# Patient Record
Sex: FEMALE | Race: WHITE | NOT HISPANIC OR LATINO
[De-identification: names, ages, dates, MRNs, and addresses within clinical notes are randomized per-mention and may not be internally consistent; named-entity substitution may affect disease eponyms.]

---

## 2017-01-09 ENCOUNTER — NON-APPOINTMENT (OUTPATIENT)
Age: 77
End: 2017-01-09

## 2017-01-09 ENCOUNTER — APPOINTMENT (OUTPATIENT)
Dept: CARDIOLOGY | Facility: CLINIC | Age: 77
End: 2017-01-09

## 2017-01-09 VITALS
WEIGHT: 157 LBS | HEART RATE: 80 BPM | HEIGHT: 64 IN | BODY MASS INDEX: 26.8 KG/M2 | DIASTOLIC BLOOD PRESSURE: 76 MMHG | SYSTOLIC BLOOD PRESSURE: 126 MMHG

## 2017-01-13 ENCOUNTER — EMERGENCY (EMERGENCY)
Facility: HOSPITAL | Age: 77
LOS: 1 days | End: 2017-01-13
Payer: COMMERCIAL

## 2017-01-13 PROCEDURE — 72125 CT NECK SPINE W/O DYE: CPT | Mod: 26

## 2017-01-13 PROCEDURE — 70450 CT HEAD/BRAIN W/O DYE: CPT | Mod: 26

## 2017-01-13 PROCEDURE — 99284 EMERGENCY DEPT VISIT MOD MDM: CPT

## 2017-01-24 ENCOUNTER — OUTPATIENT (OUTPATIENT)
Dept: OUTPATIENT SERVICES | Facility: HOSPITAL | Age: 77
LOS: 1 days | End: 2017-01-24

## 2017-02-06 ENCOUNTER — INPATIENT (INPATIENT)
Facility: HOSPITAL | Age: 77
LOS: 1 days | Discharge: NURSING, REHAB OR LONG-TERM CARE | End: 2017-02-08
Payer: MEDICARE

## 2017-02-06 ENCOUNTER — OUTPATIENT (OUTPATIENT)
Dept: OUTPATIENT SERVICES | Facility: HOSPITAL | Age: 77
LOS: 1 days | End: 2017-02-06

## 2017-02-06 PROCEDURE — 73560 X-RAY EXAM OF KNEE 1 OR 2: CPT | Mod: 26,RT

## 2017-02-07 ENCOUNTER — OUTPATIENT (OUTPATIENT)
Dept: OUTPATIENT SERVICES | Facility: HOSPITAL | Age: 77
LOS: 1 days | End: 2017-02-07

## 2017-02-08 ENCOUNTER — INPATIENT (INPATIENT)
Facility: HOSPITAL | Age: 77
LOS: 14 days | Discharge: HOME | End: 2017-02-23

## 2017-02-22 ENCOUNTER — OUTPATIENT (OUTPATIENT)
Dept: OUTPATIENT SERVICES | Facility: HOSPITAL | Age: 77
LOS: 1 days | End: 2017-02-22

## 2017-02-23 ENCOUNTER — OUTPATIENT (OUTPATIENT)
Dept: OUTPATIENT SERVICES | Facility: HOSPITAL | Age: 77
LOS: 1 days | End: 2017-02-23

## 2017-07-11 ENCOUNTER — NON-APPOINTMENT (OUTPATIENT)
Age: 77
End: 2017-07-11

## 2017-07-11 ENCOUNTER — APPOINTMENT (OUTPATIENT)
Dept: CARDIOLOGY | Facility: CLINIC | Age: 77
End: 2017-07-11
Payer: MEDICARE

## 2017-07-11 VITALS
SYSTOLIC BLOOD PRESSURE: 150 MMHG | OXYGEN SATURATION: 97 % | DIASTOLIC BLOOD PRESSURE: 82 MMHG | HEART RATE: 55 BPM | TEMPERATURE: 98 F | WEIGHT: 153 LBS | HEIGHT: 64 IN | BODY MASS INDEX: 26.12 KG/M2

## 2017-07-11 PROCEDURE — 99214 OFFICE O/P EST MOD 30 MIN: CPT

## 2017-07-11 PROCEDURE — 93000 ELECTROCARDIOGRAM COMPLETE: CPT

## 2017-07-24 ENCOUNTER — APPOINTMENT (OUTPATIENT)
Dept: CARDIOLOGY | Facility: CLINIC | Age: 77
End: 2017-07-24

## 2017-07-24 VITALS
HEART RATE: 58 BPM | WEIGHT: 148 LBS | SYSTOLIC BLOOD PRESSURE: 148 MMHG | DIASTOLIC BLOOD PRESSURE: 80 MMHG | BODY MASS INDEX: 25.27 KG/M2 | HEIGHT: 64 IN

## 2017-09-05 ENCOUNTER — OUTPATIENT (OUTPATIENT)
Dept: OUTPATIENT SERVICES | Facility: HOSPITAL | Age: 77
LOS: 1 days | End: 2017-09-05

## 2017-10-19 ENCOUNTER — APPOINTMENT (OUTPATIENT)
Dept: CARDIOLOGY | Facility: CLINIC | Age: 77
End: 2017-10-19
Payer: MEDICARE

## 2017-10-19 ENCOUNTER — NON-APPOINTMENT (OUTPATIENT)
Age: 77
End: 2017-10-19

## 2017-10-19 VITALS
DIASTOLIC BLOOD PRESSURE: 80 MMHG | BODY MASS INDEX: 24.59 KG/M2 | WEIGHT: 144 LBS | HEIGHT: 64 IN | HEART RATE: 68 BPM | SYSTOLIC BLOOD PRESSURE: 170 MMHG

## 2017-10-19 PROCEDURE — 99215 OFFICE O/P EST HI 40 MIN: CPT

## 2017-10-19 PROCEDURE — 93000 ELECTROCARDIOGRAM COMPLETE: CPT

## 2017-10-19 RX ORDER — FLUTICASONE PROPIONATE 50 UG/1
50 SPRAY, METERED NASAL
Qty: 16 | Refills: 0 | Status: COMPLETED | COMMUNITY
Start: 2017-09-12

## 2017-10-19 RX ORDER — AMOXICILLIN 875 MG/1
875 TABLET, FILM COATED ORAL
Qty: 20 | Refills: 0 | Status: COMPLETED | COMMUNITY
Start: 2017-09-01

## 2017-10-19 RX ORDER — AMOXICILLIN 500 MG/1
500 CAPSULE ORAL
Qty: 28 | Refills: 0 | Status: COMPLETED | COMMUNITY
Start: 2017-08-29

## 2017-10-19 RX ORDER — MOXIFLOXACIN OPHTHALMIC 5 MG/ML
0.5 SOLUTION/ DROPS OPHTHALMIC
Qty: 3 | Refills: 0 | Status: COMPLETED | COMMUNITY
Start: 2017-09-12

## 2017-10-19 RX ORDER — PREDNISOLONE ACETATE 10 MG/ML
1 SUSPENSION/ DROPS OPHTHALMIC
Qty: 5 | Refills: 0 | Status: COMPLETED | COMMUNITY
Start: 2017-09-12

## 2017-10-19 RX ORDER — GABAPENTIN 100 MG/1
100 CAPSULE ORAL
Qty: 90 | Refills: 0 | Status: COMPLETED | COMMUNITY
Start: 2017-06-01

## 2017-11-07 ENCOUNTER — INPATIENT (INPATIENT)
Facility: HOSPITAL | Age: 77
LOS: 2 days | Discharge: HOME | DRG: 247 | End: 2017-11-10
Attending: HOSPITALIST | Admitting: STUDENT IN AN ORGANIZED HEALTH CARE EDUCATION/TRAINING PROGRAM
Payer: MEDICARE

## 2017-11-07 VITALS
DIASTOLIC BLOOD PRESSURE: 78 MMHG | RESPIRATION RATE: 20 BRPM | OXYGEN SATURATION: 98 % | HEART RATE: 64 BPM | SYSTOLIC BLOOD PRESSURE: 165 MMHG | TEMPERATURE: 98 F

## 2017-11-07 DIAGNOSIS — R32 UNSPECIFIED URINARY INCONTINENCE: ICD-10-CM

## 2017-11-07 DIAGNOSIS — I10 ESSENTIAL (PRIMARY) HYPERTENSION: ICD-10-CM

## 2017-11-07 DIAGNOSIS — I20.0 UNSTABLE ANGINA: ICD-10-CM

## 2017-11-07 DIAGNOSIS — E11.9 TYPE 2 DIABETES MELLITUS WITHOUT COMPLICATIONS: ICD-10-CM

## 2017-11-07 DIAGNOSIS — Z29.9 ENCOUNTER FOR PROPHYLACTIC MEASURES, UNSPECIFIED: ICD-10-CM

## 2017-11-07 LAB
ALBUMIN SERPL ELPH-MCNC: 4.5 G/DL — SIGNIFICANT CHANGE UP (ref 3.3–5)
ALP SERPL-CCNC: 100 U/L — SIGNIFICANT CHANGE UP (ref 40–120)
ALT FLD-CCNC: 21 U/L RC — SIGNIFICANT CHANGE UP (ref 10–45)
ANION GAP SERPL CALC-SCNC: 15 MMOL/L — SIGNIFICANT CHANGE UP (ref 5–17)
AST SERPL-CCNC: 23 U/L — SIGNIFICANT CHANGE UP (ref 10–40)
BASOPHILS # BLD AUTO: 0.1 K/UL — SIGNIFICANT CHANGE UP (ref 0–0.2)
BASOPHILS NFR BLD AUTO: 1 % — SIGNIFICANT CHANGE UP (ref 0–2)
BILIRUB SERPL-MCNC: 0.5 MG/DL — SIGNIFICANT CHANGE UP (ref 0.2–1.2)
BUN SERPL-MCNC: 22 MG/DL — SIGNIFICANT CHANGE UP (ref 7–23)
CALCIUM SERPL-MCNC: 9.8 MG/DL — SIGNIFICANT CHANGE UP (ref 8.4–10.5)
CHLORIDE SERPL-SCNC: 100 MMOL/L — SIGNIFICANT CHANGE UP (ref 96–108)
CK MB BLD-MCNC: 5.8 % — HIGH (ref 0–3.5)
CK MB CFR SERPL CALC: 2 NG/ML — SIGNIFICANT CHANGE UP (ref 0–3.8)
CK MB CFR SERPL CALC: 2.1 NG/ML — SIGNIFICANT CHANGE UP (ref 0–3.8)
CK SERPL-CCNC: 28 U/L — SIGNIFICANT CHANGE UP (ref 25–170)
CK SERPL-CCNC: 36 U/L — SIGNIFICANT CHANGE UP (ref 25–170)
CO2 SERPL-SCNC: 24 MMOL/L — SIGNIFICANT CHANGE UP (ref 22–31)
CREAT SERPL-MCNC: 0.8 MG/DL — SIGNIFICANT CHANGE UP (ref 0.5–1.3)
EOSINOPHIL # BLD AUTO: 0.1 K/UL — SIGNIFICANT CHANGE UP (ref 0–0.5)
EOSINOPHIL NFR BLD AUTO: 0.8 % — SIGNIFICANT CHANGE UP (ref 0–6)
GAS PNL BLDV: SIGNIFICANT CHANGE UP
GLUCOSE BLDC GLUCOMTR-MCNC: 142 MG/DL — HIGH (ref 70–99)
GLUCOSE SERPL-MCNC: 116 MG/DL — HIGH (ref 70–99)
HCT VFR BLD CALC: 48.3 % — HIGH (ref 34.5–45)
HGB BLD-MCNC: 16.1 G/DL — HIGH (ref 11.5–15.5)
LYMPHOCYTES # BLD AUTO: 2.4 K/UL — SIGNIFICANT CHANGE UP (ref 1–3.3)
LYMPHOCYTES # BLD AUTO: 25.7 % — SIGNIFICANT CHANGE UP (ref 13–44)
MCHC RBC-ENTMCNC: 30.5 PG — SIGNIFICANT CHANGE UP (ref 27–34)
MCHC RBC-ENTMCNC: 33.4 GM/DL — SIGNIFICANT CHANGE UP (ref 32–36)
MCV RBC AUTO: 91.3 FL — SIGNIFICANT CHANGE UP (ref 80–100)
MONOCYTES # BLD AUTO: 1.1 K/UL — HIGH (ref 0–0.9)
MONOCYTES NFR BLD AUTO: 11.9 % — SIGNIFICANT CHANGE UP (ref 2–14)
NEUTROPHILS # BLD AUTO: 5.6 K/UL — SIGNIFICANT CHANGE UP (ref 1.8–7.4)
NEUTROPHILS NFR BLD AUTO: 60.6 % — SIGNIFICANT CHANGE UP (ref 43–77)
PLATELET # BLD AUTO: 221 K/UL — SIGNIFICANT CHANGE UP (ref 150–400)
POTASSIUM SERPL-MCNC: 3.9 MMOL/L — SIGNIFICANT CHANGE UP (ref 3.5–5.3)
POTASSIUM SERPL-SCNC: 3.9 MMOL/L — SIGNIFICANT CHANGE UP (ref 3.5–5.3)
PROT SERPL-MCNC: 7.7 G/DL — SIGNIFICANT CHANGE UP (ref 6–8.3)
RBC # BLD: 5.3 M/UL — HIGH (ref 3.8–5.2)
RBC # FLD: 13 % — SIGNIFICANT CHANGE UP (ref 10.3–14.5)
SODIUM SERPL-SCNC: 139 MMOL/L — SIGNIFICANT CHANGE UP (ref 135–145)
TROPONIN T SERPL-MCNC: <0.01 NG/ML — SIGNIFICANT CHANGE UP (ref 0–0.06)
TROPONIN T SERPL-MCNC: <0.01 NG/ML — SIGNIFICANT CHANGE UP (ref 0–0.06)
WBC # BLD: 9.2 K/UL — SIGNIFICANT CHANGE UP (ref 3.8–10.5)
WBC # FLD AUTO: 9.2 K/UL — SIGNIFICANT CHANGE UP (ref 3.8–10.5)

## 2017-11-07 PROCEDURE — 71020: CPT | Mod: 26

## 2017-11-07 PROCEDURE — 99223 1ST HOSP IP/OBS HIGH 75: CPT

## 2017-11-07 PROCEDURE — 99497 ADVNCD CARE PLAN 30 MIN: CPT | Mod: 25

## 2017-11-07 PROCEDURE — 99285 EMERGENCY DEPT VISIT HI MDM: CPT | Mod: GC

## 2017-11-07 PROCEDURE — 99223 1ST HOSP IP/OBS HIGH 75: CPT | Mod: GC

## 2017-11-07 RX ORDER — CLOPIDOGREL BISULFATE 75 MG/1
300 TABLET, FILM COATED ORAL ONCE
Qty: 0 | Refills: 0 | Status: COMPLETED | OUTPATIENT
Start: 2017-11-07 | End: 2017-11-07

## 2017-11-07 RX ORDER — HEPARIN SODIUM 5000 [USP'U]/ML
3800 INJECTION INTRAVENOUS; SUBCUTANEOUS EVERY 6 HOURS
Qty: 0 | Refills: 0 | Status: DISCONTINUED | OUTPATIENT
Start: 2017-11-07 | End: 2017-11-08

## 2017-11-07 RX ORDER — AMLODIPINE BESYLATE 2.5 MG/1
10 TABLET ORAL DAILY
Qty: 0 | Refills: 0 | Status: DISCONTINUED | OUTPATIENT
Start: 2017-11-07 | End: 2017-11-10

## 2017-11-07 RX ORDER — ASPIRIN/CALCIUM CARB/MAGNESIUM 324 MG
162 TABLET ORAL ONCE
Qty: 0 | Refills: 0 | Status: COMPLETED | OUTPATIENT
Start: 2017-11-07 | End: 2017-11-07

## 2017-11-07 RX ORDER — INSULIN LISPRO 100/ML
VIAL (ML) SUBCUTANEOUS AT BEDTIME
Qty: 0 | Refills: 0 | Status: DISCONTINUED | OUTPATIENT
Start: 2017-11-07 | End: 2017-11-10

## 2017-11-07 RX ORDER — GLUCAGON INJECTION, SOLUTION 0.5 MG/.1ML
1 INJECTION, SOLUTION SUBCUTANEOUS ONCE
Qty: 0 | Refills: 0 | Status: DISCONTINUED | OUTPATIENT
Start: 2017-11-07 | End: 2017-11-10

## 2017-11-07 RX ORDER — DEXTROSE 50 % IN WATER 50 %
25 SYRINGE (ML) INTRAVENOUS ONCE
Qty: 0 | Refills: 0 | Status: DISCONTINUED | OUTPATIENT
Start: 2017-11-07 | End: 2017-11-10

## 2017-11-07 RX ORDER — INSULIN LISPRO 100/ML
VIAL (ML) SUBCUTANEOUS
Qty: 0 | Refills: 0 | Status: DISCONTINUED | OUTPATIENT
Start: 2017-11-07 | End: 2017-11-10

## 2017-11-07 RX ORDER — INFLUENZA VIRUS VACCINE 15; 15; 15; 15 UG/.5ML; UG/.5ML; UG/.5ML; UG/.5ML
0.5 SUSPENSION INTRAMUSCULAR ONCE
Qty: 0 | Refills: 0 | Status: COMPLETED | OUTPATIENT
Start: 2017-11-07 | End: 2017-11-09

## 2017-11-07 RX ORDER — DEXTROSE 50 % IN WATER 50 %
1 SYRINGE (ML) INTRAVENOUS ONCE
Qty: 0 | Refills: 0 | Status: DISCONTINUED | OUTPATIENT
Start: 2017-11-07 | End: 2017-11-10

## 2017-11-07 RX ORDER — SODIUM CHLORIDE 9 MG/ML
1000 INJECTION, SOLUTION INTRAVENOUS
Qty: 0 | Refills: 0 | Status: DISCONTINUED | OUTPATIENT
Start: 2017-11-07 | End: 2017-11-10

## 2017-11-07 RX ORDER — HEPARIN SODIUM 5000 [USP'U]/ML
INJECTION INTRAVENOUS; SUBCUTANEOUS
Qty: 25000 | Refills: 0 | Status: DISCONTINUED | OUTPATIENT
Start: 2017-11-07 | End: 2017-11-08

## 2017-11-07 RX ORDER — DEXTROSE 50 % IN WATER 50 %
12.5 SYRINGE (ML) INTRAVENOUS ONCE
Qty: 0 | Refills: 0 | Status: DISCONTINUED | OUTPATIENT
Start: 2017-11-07 | End: 2017-11-10

## 2017-11-07 RX ORDER — CLOPIDOGREL BISULFATE 75 MG/1
75 TABLET, FILM COATED ORAL DAILY
Qty: 0 | Refills: 0 | Status: DISCONTINUED | OUTPATIENT
Start: 2017-11-08 | End: 2017-11-10

## 2017-11-07 RX ADMIN — Medication 162 MILLIGRAM(S): at 15:11

## 2017-11-07 RX ADMIN — HEPARIN SODIUM 750 UNIT(S)/HR: 5000 INJECTION INTRAVENOUS; SUBCUTANEOUS at 18:56

## 2017-11-07 NOTE — H&P ADULT - NSHPPHYSICALEXAM_GEN_ALL_CORE
PHYSICAL EXAM:  GENERAL: NAD, well-groomed, well-developed  HEAD:  Atraumatic, Normocephalic  EYES: EOMI, PERRLA, conjunctiva and sclera clear  ENMT: No tonsillar erythema, exudates, or enlargement; Moist mucous membranes, Good dentition, No lesions  NECK: Supple, No JVD, Normal thyroid  CHEST/LUNG: Clear to percussion bilaterally; No rales, rhonchi, wheezing, or rubs  HEART: Regular rate and rhythm; No murmurs, rubs, or gallops, no LE edema.   ABDOMEN: Soft, Nontender, Nondistended; Bowel sounds present  EXTREMITIES:  2+ Peripheral Pulses, No clubbing, cyanosis, or edema  LYMPH: No lymphadenopathy noted  SKIN: No rashes or lesions  NERVOUS SYSTEM:  Alert & Oriented X3, Good concentration; Motor Strength 5/5 B/L upper and lower extremities.

## 2017-11-07 NOTE — H&P ADULT - PROBLEM SELECTOR PLAN 5
c/w hep gtt  -pt on omeprazole at home; pt again wants to take own med instead of formulary ppi given prior issues with swallowing alternate pills; omeprazole to be verified.

## 2017-11-07 NOTE — ED PROVIDER NOTE - PROGRESS NOTE DETAILS
JOHANNA PGY-2: Pt admitted to hosptialist for unstable angina. pt took plavix 75mg this AM will dose 225mg now heparin gtt started

## 2017-11-07 NOTE — H&P ADULT - NSHPSOCIALHISTORY_GEN_ALL_CORE
Social History:    Marital Status:  (   )    ( x  ) Single    (   )    (  )   Occupation: retired [employer]  Lives with: (x  ) alone  (  ) children   (  ) spouse   (  ) parents  (  ) other    Substance Use (street drugs): ( x ) never used  (  ) other:  Tobacco Usage:  ( x  ) never smoked   (   ) former smoker   (   ) current smoker  (     ) pack year  (        ) last cigarette date  Alcohol Usage: denies  Sexual History:

## 2017-11-07 NOTE — PATIENT PROFILE ADULT. - NS PRO AD PATIENT TYPE
Living Will/Health Care Proxy (HCP) Health Care Proxy (HCP)/Living Will/Nonhospital DNR- MD Scar Howard aware/Do Not Resuscitate (DNR)

## 2017-11-07 NOTE — H&P ADULT - PSH
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

## 2017-11-07 NOTE — H&P ADULT - PROBLEM SELECTOR PLAN 1
-hpi c/w unstable angina; worsening sx with exertion ok at rest  -cards recs appreciated; given high risk features/hx/pmh, plan for LHC in am  -s/p asa/plavix load; c/w asa/plavix daily  -c/w hep gtt  -pt not on BB at home, consider adding post cath  -c/w arb  -trend cardiac enzymes, monitor on tele -hpi c/w unstable angina; worsening sx with exertion ok at rest  -cards recs appreciated; given high risk features/hx/pmh, plan for LHC in am  -s/p asa/plavix load; c/w asa/plavix daily  -c/w hep gtt  -pt not on BB at home, consider adding post cath  -c/w arb  -trend cardiac enzymes, monitor on tele  -pt to continue on statin (crestor to be verified as alternate not tolerated 2/2 dysphagia)

## 2017-11-07 NOTE — H&P ADULT - PROBLEM SELECTOR PLAN 6
-20 minutes spent clarifying advanced directives with patient and/or family at bedside.  Patient initially presented with multiple advanced care forms present; she had a DNR form signed.  After discussing the DNR to confirm its validity, she states she did not truly understand what it meant and that she did not want a breathing tube, but did want CPR.  She expressed her main concern was "not to end up a vegetable." We discussed all details of CPR, intubation, and the code status of DNR vs DNI.  At this time, patient demonstrates capacity with insight into condition, and chooses to be FULL CODE.  -Her dnr forms will be disposed of, order in sunrise d/c.   -All questions answered at bedside.   -Pt has proxy forms with her at bedside, RN to photocopy and place in chart; daughter/son are proxies; at this time, patients wishes after re-education are to have trial of CPR and intubation should she undergo cardiac arrest, and for her family to withdraw care afterwards if it appears her clinical status were not to improve.

## 2017-11-07 NOTE — CONSULT NOTE ADULT - SUBJECTIVE AND OBJECTIVE BOX
Date of Admission:    Patient is a 77y old  Female who presents with a chief complaint of chest pain     HISTORY OF PRESENT ILLNESS:     78 yo woman w/PMHx HTN, HLD, GERD, Chronic R. brachial a. occlusion, sinus bradycardia, pulmonary fibrosis and CAD s/p PCI mRCA and hx of instent restenosis of the R. postero lateral branch managed with ALEJANDRA now p/w chest pain and VILLA for 1 week.         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 No nausea, vomiting, or hematemesis; No diarrhea or constipation. No melena or hematochezia.  BACK: No back pain  GENITOURINARY: No dysuria, frequency or hematuria  NEUROLOGICAL: No numbness or weakness  SKIN: No itching, burning, rashes, or lesions   All other review of systems is negative unless indicated above.      PHYSICAL EXAM:  T(C): 36.8 (11-07-17 @ 13:59), Max: 36.8 (11-07-17 @ 13:59)  HR: 64 (11-07-17 @ 13:59) (64 - 64)  BP: 165/78 (11-07-17 @ 13:59) (165/78 - 165/78)  RR: 20 (11-07-17 @ 13:59) (20 - 20)  SpO2: 98% (11-07-17 @ 13:59) (98% - 98%)  Wt(kg): --  I&O's Summary    Appearance: Normal	  HEENT:   Normal oral mucosa, PERRL, EOMI	  Lymphatic: No lymphadenopathy  Cardiovascular: Normal S1 S2, No JVD, systolic crescendo decrescendo murmur heard at LUSB , No edema  Respiratory: Lungs clear to auscultation	  Psychiatry: A & O x 3, Mood & affect appropriate  Gastrointestinal:  Soft, Non-tender, + BS	  Skin: No rashes, No ecchymoses, No cyanosis	  Neurologic: Non-focal  Extremities: Normal range of motion, No clubbing, cyanosis or edema  Vascular: Peripheral pulses palpable 2+ bilaterally        LABS:	 	    CBC Full  -  ( 07 Nov 2017 15:12 )  WBC Count : 9.2 K/uL  Hemoglobin : 16.1 g/dL  Hematocrit : 48.3 %  Platelet Count - Automated : 221 K/uL  Mean Cell Volume : 91.3 fl  Mean Cell Hemoglobin : 30.5 pg  Mean Cell Hemoglobin Concentration : 33.4 gm/dL  Auto Neutrophil # : 5.6 K/uL  Auto Lymphocyte # : 2.4 K/uL  Auto Monocyte # : 1.1 K/uL  Auto Eosinophil # : 0.1 K/uL  Auto Basophil # : 0.1 K/uL  Auto Neutrophil % : 60.6 %  Auto Lymphocyte % : 25.7 %  Auto Monocyte % : 11.9 %  Auto Eosinophil % : 0.8 %  Auto Basophil % : 1.0 %    11-07    139  |  100  |  22  ----------------------------<  116<H>  3.9   |  24  |  0.80    Ca    9.8      07 Nov 2017 15:12    TPro  7.7  /  Alb  4.5  /  TBili  0.5  /  DBili  x   /  AST  23  /  ALT  21  /  AlkPhos  100  11-07      proBNP: Serum Pro-Brain Natriuretic Peptide: 42 pg/mL (11-07 @ 15:12)    Initial trop negative.      ECG:  	      PREVIOUS DIAGNOSTIC TESTING:      EKG: 10/19/2017, RSR-sinus bradycardia 56, IL.20 borderline 1st degree heart block, QRS.09, -30 degrees, QTc.42, poor precordial R wave progression. There is no change from multiple prior.     Cardiac Cath: 11/13/2014, 100% Cx-OM1 well collateralized from right, moderate mid-LAD, patent right postero-lateral with no in-stent re-stenosis. Normal ventricular function.     Stent: 7/29/2013, new ALEJANDRA to 95% re-stenosis in right postero-lateral, prior stent 2007, patent Taxus to mid-RCA. Chronic total mid-LAD occlusion.     ASSESSMENT/PLAN: 	    77F w/HTN, HLD, GERD, Chronic R. brachial a. occlusion, sinus bradycardia, pulmonary fibrosis and CAD s/p PCI mRCA and hx of instent restenosis of the R. postero lateral branch managed with ALEJANDRA now p/w chest pain and VILLA for 1 week. Date of Admission: 11/7/17     Patient is a 77y old  Female who presents with a chief complaint of chest pain     HISTORY OF PRESENT ILLNESS:     76 yo woman w/PMHx DM HTN, HLD, GERD, Chronic R. brachial a. occlusion, sinus bradycardia, pulmonary fibrosis and CAD s/p PCI mRCA and hx of instent restenosis of the R. postero lateral branch managed with ALEJANDRA now p/w chest pain and VILLA for 9 days. She reports nonradiating substernal pressure with occasional associated nausea improved with rest. She presented to the ED today because the pressure was significantly bothersome when ambulating up a flight of stairs and she reports "being unable to catch [her] breath." The chest pressure and VILLA are relieved with rest.  No recent med changes. She is adherent with her medications. ROS otherwise negative for palpitations, orthopnea, LE edema, abdominal pain, change in bowel habits, or sick contacts/viral symptoms.       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 No nausea, vomiting, or hematemesis; No diarrhea or constipation. No melena or hematochezia.  BACK: No back pain  GENITOURINARY: No dysuria, frequency or hematuria  NEUROLOGICAL: No numbness or weakness  SKIN: No itching, burning, rashes, or lesions   All other review of systems is negative unless indicated above.      PHYSICAL EXAM:  T(C): 36.8 (11-07-17 @ 13:59), Max: 36.8 (11-07-17 @ 13:59)  HR: 64 (11-07-17 @ 13:59) (64 - 64)  BP: 165/78 (11-07-17 @ 13:59) (165/78 - 165/78)  RR: 20 (11-07-17 @ 13:59) (20 - 20)  SpO2: 98% (11-07-17 @ 13:59) (98% - 98%)  Wt(kg): --  I&O's Summary    Appearance: Very pleasant, sitting up comfortably in ED stretcher in no distress 	  HEENT:   Normal oral mucosa, PERRL, EOMI	  Lymphatic: No lymphadenopathy  Cardiovascular: Normal S1 S2, No JVD, soft systolic murmur heard at LUSB , No edema  Respiratory: Lungs clear to auscultation	  Psychiatry: A & O x 3, Mood & affect appropriate  Gastrointestinal:  Soft, Non-tender, + BS	  Skin: No rashes, No ecchymoses, No cyanosis	  Neurologic: Non-focal  Extremities: Normal range of motion, No clubbing, cyanosis or edema  Vascular: Peripheral pulses palpable 2+ bilaterally, Absent R. radial pulse (chronic)         LABS:	 	    CBC Full  -  ( 07 Nov 2017 15:12 )  WBC Count : 9.2 K/uL  Hemoglobin : 16.1 g/dL  Hematocrit : 48.3 %  Platelet Count - Automated : 221 K/uL  Mean Cell Volume : 91.3 fl  Mean Cell Hemoglobin : 30.5 pg  Mean Cell Hemoglobin Concentration : 33.4 gm/dL  Auto Neutrophil # : 5.6 K/uL  Auto Lymphocyte # : 2.4 K/uL  Auto Monocyte # : 1.1 K/uL  Auto Eosinophil # : 0.1 K/uL  Auto Basophil # : 0.1 K/uL  Auto Neutrophil % : 60.6 %  Auto Lymphocyte % : 25.7 %  Auto Monocyte % : 11.9 %  Auto Eosinophil % : 0.8 %  Auto Basophil % : 1.0 %    11-07    139  |  100  |  22  ----------------------------<  116<H>  3.9   |  24  |  0.80    Ca    9.8      07 Nov 2017 15:12    TPro  7.7  /  Alb  4.5  /  TBili  0.5  /  DBili  x   /  AST  23  /  ALT  21  /  AlkPhos  100  11-07      proBNP: Serum Pro-Brain Natriuretic Peptide: 42 pg/mL (11-07 @ 15:12)    Initial trop negative.      ECG:  	NSR q waves inferior/anteroseptal leads; no acute ST changes.       PREVIOUS DIAGNOSTIC TESTING:      EKG: 10/19/2017, RSR-sinus bradycardia 56, MN.20 borderline 1st degree heart block, QRS.09, -30 degrees, QTc.42, poor precordial R wave progression. There is no change from multiple prior.     Cardiac Cath: 11/13/2014, 100% Cx-OM1 well collateralized from right, moderate mid-LAD, patent right postero-lateral with no in-stent re-stenosis. Normal ventricular function.     Stent: 7/29/2013, new ALEJANDRA to 95% re-stenosis in right postero-lateral, prior stent 2007, patent Taxus to mid-RCA. Chronic total mid-LAD occlusion.     ASSESSMENT/PLAN: 	    77F w/HTN, HLD, GERD, Chronic R. brachial a. occlusion, sinus bradycardia, pulmonary fibrosis and CAD s/p PCI mRCA and hx of instent restenosis of the R. postero lateral branch managed with ALEJANDRA now p/w chest pain and VILLA for 9 days concerning for unstable angina.     #Chest pressure and VILLA in a patient with high risk features including hx of prior CAD, DM. Symptoms concerning for unstable angina. No acute EKG changes; Initial trop negative  - s/p ASA load Recommend heparin gtt, Plavix 300mg qd  - Trend serial enzymes and EKG    Will d/w Dr. Dye and f/u primary ED team. Date of Admission: 11/7/17     Patient is a 77y old  Female who presents with a chief complaint of chest pain     HISTORY OF PRESENT ILLNESS:     76 yo woman w/PMHx DM HTN, HLD, GERD, Chronic R. brachial a. occlusion, sinus bradycardia, pulmonary fibrosis and CAD s/p PCI mRCA and hx of instent restenosis of the R. postero lateral branch managed with ALEJANDRA now p/w chest pain and VILLA for 9 days. She reports nonradiating substernal pressure with occasional associated nausea improved with rest. She presented to the ED today because the pressure was significantly bothersome when ambulating up a flight of stairs and she reports "being unable to catch [her] breath." The chest pressure and VILLA are relieved with rest.  No recent med changes. She is adherent with her medications. ROS otherwise negative for palpitations, orthopnea, LE edema, abdominal pain, change in bowel habits, or sick contacts/viral symptoms.       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 No nausea, vomiting, or hematemesis; No diarrhea or constipation. No melena or hematochezia.  BACK: No back pain  GENITOURINARY: No dysuria, frequency or hematuria  NEUROLOGICAL: No numbness or weakness  SKIN: No itching, burning, rashes, or lesions   All other review of systems is negative unless indicated above.      PHYSICAL EXAM:  T(C): 36.8 (11-07-17 @ 13:59), Max: 36.8 (11-07-17 @ 13:59)  HR: 64 (11-07-17 @ 13:59) (64 - 64)  BP: 165/78 (11-07-17 @ 13:59) (165/78 - 165/78)  RR: 20 (11-07-17 @ 13:59) (20 - 20)  SpO2: 98% (11-07-17 @ 13:59) (98% - 98%)  Wt(kg): --  I&O's Summary    Appearance: Very pleasant, sitting up comfortably in ED stretcher in no distress 	  HEENT:   Normal oral mucosa, PERRL, EOMI	  Lymphatic: No lymphadenopathy  Cardiovascular: Normal S1 S2, No JVD, soft systolic murmur heard at LUSB , No edema  Respiratory: Lungs clear to auscultation	  Psychiatry: A & O x 3, Mood & affect appropriate  Gastrointestinal:  Soft, Non-tender, + BS	  Skin: No rashes, No ecchymoses, No cyanosis	  Neurologic: Non-focal  Extremities: Normal range of motion, No clubbing, cyanosis or edema  Vascular: Peripheral pulses palpable 2+ bilaterally, Absent R. radial pulse (chronic)         LABS:	 	    CBC Full  -  ( 07 Nov 2017 15:12 )  WBC Count : 9.2 K/uL  Hemoglobin : 16.1 g/dL  Hematocrit : 48.3 %  Platelet Count - Automated : 221 K/uL  Mean Cell Volume : 91.3 fl  Mean Cell Hemoglobin : 30.5 pg  Mean Cell Hemoglobin Concentration : 33.4 gm/dL  Auto Neutrophil # : 5.6 K/uL  Auto Lymphocyte # : 2.4 K/uL  Auto Monocyte # : 1.1 K/uL  Auto Eosinophil # : 0.1 K/uL  Auto Basophil # : 0.1 K/uL  Auto Neutrophil % : 60.6 %  Auto Lymphocyte % : 25.7 %  Auto Monocyte % : 11.9 %  Auto Eosinophil % : 0.8 %  Auto Basophil % : 1.0 %    11-07    139  |  100  |  22  ----------------------------<  116<H>  3.9   |  24  |  0.80    Ca    9.8      07 Nov 2017 15:12    TPro  7.7  /  Alb  4.5  /  TBili  0.5  /  DBili  x   /  AST  23  /  ALT  21  /  AlkPhos  100  11-07      proBNP: Serum Pro-Brain Natriuretic Peptide: 42 pg/mL (11-07 @ 15:12)    Initial trop negative.      ECG:  	NSR q waves inferior/anteroseptal leads; no acute ST changes.       PREVIOUS DIAGNOSTIC TESTING:      EKG: 10/19/2017, RSR-sinus bradycardia 56, VA.20 borderline 1st degree heart block, QRS.09, -30 degrees, QTc.42, poor precordial R wave progression. There is no change from multiple prior.     Cardiac Cath: 11/13/2014, 100% Cx-OM1 well collateralized from right, moderate mid-LAD, patent right postero-lateral with no in-stent re-stenosis. Normal ventricular function.     Stent: 7/29/2013, new ALEJANDRA to 95% re-stenosis in right postero-lateral, prior stent 2007, patent Taxus to mid-RCA. Chronic total mid-LAD occlusion.     ASSESSMENT/PLAN: 	    77F w/HTN, HLD, GERD, Chronic R. brachial a. occlusion, sinus bradycardia, pulmonary fibrosis and CAD s/p PCI mRCA and hx of instent restenosis of the R. postero lateral branch managed with ALEJANDRA now p/w chest pain and VILLA for 9 days concerning for unstable angina.     #Chest pressure and VILLA in a patient with high risk features including hx of prior CAD, DM. Symptoms concerning for unstable angina. No acute EKG changes; Initial trop negative  - s/p ASA load Recommend heparin gtt, Plavix 300mg qd  - Trend serial enzymes and EKG  - LHC in am; previous cath done by Dr. Viera. Date of Admission: 11/7/17     Patient is a 77y old  Female who presents with a chief complaint of chest pain     HISTORY OF PRESENT ILLNESS:     78 yo woman w/PMHx DM HTN, HLD, GERD, Chronic R. brachial a. occlusion, sinus bradycardia, pulmonary fibrosis.  Hx. of CAD s/p PCI mRCA and hx of in-stent restenosis of the R. postero lateral branch managed with ALEJANDRA.  Now p/w chest pain and VILLA for 9 days. She reports non-radiating substernal pressure with occasional associated nausea improved with rest. She presented to the ED today because the pressure was significantly bothersome when ambulating up a flight of stairs and she reports "being unable to catch [her] breath." The chest pressure and VILLA are relieved with rest.    No recent med changes. She is adherent with her medications. ROS otherwise negative for palpitations, orthopnea, LE edema, abdominal pain, change in bowel habits, or sick contacts/viral symptoms.     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 No nausea, vomiting, or hematemesis; No diarrhea or constipation. No melena or hematochezia.  BACK: No back pain  GENITOURINARY: No dysuria, frequency or hematuria  NEUROLOGICAL: No numbness or weakness  SKIN: No itching, burning, rashes, or lesions   All other review of systems is negative unless indicated above.      PHYSICAL EXAM:  T(C): 36.8 (11-07-17 @ 13:59), Max: 36.8 (11-07-17 @ 13:59)  HR: 64 (11-07-17 @ 13:59) (64 - 64)  BP: 165/78 (11-07-17 @ 13:59) (165/78 - 165/78)  RR: 20 (11-07-17 @ 13:59) (20 - 20)  SpO2: 98% (11-07-17 @ 13:59) (98% - 98%)      Appearance: Very pleasant, sitting up comfortably in ED stretcher in no distress 	  HEENT:   Normal oral mucosa, PERRL, EOMI	  Lymphatic: No lymphadenopathy  Cardiovascular: Normal S1 S2, No JVD, soft systolic murmur heard at LUSB , No edema  Respiratory: Lungs clear to auscultation	  Psychiatry: A & O x 3, Mood & affect appropriate  Gastrointestinal:  Soft, Non-tender, + BS	  Skin: No rashes, No ecchymoses, No cyanosis	  Neurologic: Non-focal  Extremities: Normal range of motion, No clubbing, cyanosis or edema  Vascular: Peripheral pulses palpable 2+ bilaterally, Absent R. radial pulse (chronic)     ECG:  	NSR.  LAD  Q waves inferiorly c/w prior IWMI.   Poor R wave progression, likely due to LAD.  No acute ST/T changes.     LABS:	 	    CBC Full  -  ( 07 Nov 2017 15:12 )  WBC Count : 9.2 K/uL  Hemoglobin : 16.1 g/dL  Hematocrit : 48.3 %  Platelet Count - Automated : 221 K/uL  Mean Cell Volume : 91.3 fl  Mean Cell Hemoglobin : 30.5 pg  Mean Cell Hemoglobin Concentration : 33.4 gm/dL  Auto Neutrophil # : 5.6 K/uL  Auto Lymphocyte # : 2.4 K/uL  Auto Monocyte # : 1.1 K/uL  Auto Eosinophil # : 0.1 K/uL  Auto Basophil # : 0.1 K/uL  Auto Neutrophil % : 60.6 %  Auto Lymphocyte % : 25.7 %  Auto Monocyte % : 11.9 %  Auto Eosinophil % : 0.8 %  Auto Basophil % : 1.0 %    11-07    139  |  100  |  22  ----------------------------<  116<H>  3.9   |  24  |  0.80    Ca    9.8      07 Nov 2017 15:12    TPro  7.7  /  Alb  4.5  /  TBili  0.5  /  DBili  x   /  AST  23  /  ALT  21  /  AlkPhos  100  11-07    proBNP: Serum Pro-Brain Natriuretic Peptide: 42 pg/mL (11-07 @ 15:12)    Initial trop negative.      PREVIOUS DIAGNOSTIC TESTING:      EKG: 10/19/2017, RSR-sinus bradycardia 56, TX.20 borderline 1st degree heart block, QRS.09, -30 degrees, QTc.42, poor precordial R wave progression. There is no change from multiple prior.     Cardiac Cath: 11/13/2014, 100% Cx-OM1 well collateralized from right, moderate mid-LAD, patent right postero-lateral with no in-stent re-stenosis. Normal ventricular function.     Stent: 7/29/2013, new ALEJANDRA to 95% re-stenosis in right postero-lateral, prior stent 2007, patent Taxus to mid-RCA. Chronic total mid-LAD occlusion.     ASSESSMENT/PLAN: 	    77F w/HTN, HLD, GERD, Chronic R. brachial a. occlusion, sinus bradycardia, pulmonary fibrosis.  CAD s/p PCI mRCA and hx of instent restenosis of the R. postero lateral branch managed with ALEJANDRA  Now p/w chest pain and VILLA for 9 days concerning for unstable angina.     #Chest pressure and VILLA in a patient with high risk features including hx of prior CAD, DM. Symptoms concerning for unstable angina. No acute EKG changes; Initial trop negative  - s/p ASA load Recommend heparin gtt, Plavix 300mg qd  - Trend serial enzymes and EKG  - LHC in am; previous cath done by Dr. Viera.     #HTN - continue usual med    #HLD - continue statin    ALVA Mena MD  Cardiology fellow  [id number]

## 2017-11-07 NOTE — ED PROVIDER NOTE - OBJECTIVE STATEMENT
77F PMH CAD s/p stents p/w one week of worsening chest pain and dyspnea with exertion which improves with rest no associated n/v, cough, fever, chills, diaphoresis. Pain and SOB were particularly concerning today after walking up 2 flights.

## 2017-11-07 NOTE — H&P ADULT - PROBLEM SELECTOR PLAN 2
-c/w [facility]  -pt requests home med of irbesartan-hctz as she is unable to swallow other formulary versions of arbs; discussed with covering NP, meds are at bedside Irbesartan-hctz (Avelide) to be verified by in house pharmacy and pt can then take own meds

## 2017-11-07 NOTE — ED ADULT NURSE NOTE - OBJECTIVE STATEMENT
Pt is an ambulatory 77 yr old female a/oX 3 c/o chest pain that started 9 days ago that only comes on with activity.  PERRl wnl, malloy with equal strength.  Chest pain is localized substernal and described as sharp and squeezing.  Pt has PMH of 3 stents and 2 MI's.  NSR on portable cardiac monitor at 66 bpm.  No active chest pain at rest or SOB.  No fevers, chills or N/V.  Abdomen NT ND with BS+4Q.  SKIN WDI.  Peripheral pulses +2bl no edema.  Pt denies orthopnea

## 2017-11-07 NOTE — H&P ADULT - NSHPREVIEWOFSYSTEMS_GEN_ALL_CORE
REVIEW OF SYSTEMS:  CONSTITUTIONAL: No weakness. No fevers. No chills. No weight loss. Good appetite.  EYES: No visual changes. No eye pain.  ENT: No hearing difficulty. No vertigo. No dysphagia. No Sinusitis/rhinorrhea.  NECK: No pain. No stiffness/rigidity.  CARDIAC: No chest pain at rest currently. No palpitations.  RESPIRATORY: No cough. No SOB. No hemoptysis.  GASTROINTESTINAL: No abdominal pain. No nausea. No vomiting. No hematemesis. No diarrhea. No constipation. No melena. No hematochezia.  GENITOURINARY: No dysuria. No frequency. No hesitancy. No hematuria.  NEUROLOGICAL: No numbness. No focal weakness. No incontinence. No headache.  BACK: No back pain.  EXTREMITIES: No lower extremity edema. Full ROM.  SKIN: No rashes. No itching. No other lesions.  PSYCHIATRIC: No depression. No anxiety. No SI/HI.  ALLERGIC: No lip swelling. No hives.  All other review of systems is negative unless indicated above.

## 2017-11-07 NOTE — ED PROVIDER NOTE - ATTENDING CONTRIBUTION TO CARE
Attending MD Amanda:  I personally have seen and examined this patient.  Resident note reviewed and agree on plan of care and except where noted.  See MDM for details.

## 2017-11-07 NOTE — H&P ADULT - ATTENDING COMMENTS
Case endorsed to [facility] MAJOR Mckenzie at 1015 pm; she is aware to have meds verified by pharmacy as mentioned Case endorsed to [facility] NP Magaly at 1015 pm; she is aware to have meds verified by pharmacy as mentioned.    Care to be assumed by day hospitalist in am.

## 2017-11-07 NOTE — H&P ADULT - ASSESSMENT
77 F PMH CAD with stents (2007, restenosis 2013) on DAPT, HTN, HLD, T2DM, multiple back surgeries c/b collapsed lung remotely, CARLOS, evelin, urinary incontinence, dysphagia p/w 9 days of unstable angina.

## 2017-11-07 NOTE — ED PROVIDER NOTE - PMH
CAD (coronary artery disease)  s/p stents x2-2007  Family history of coronary artery disease    Hypercholesteremia    Hypertension    MI (myocardial infarction)  2007

## 2017-11-07 NOTE — ED PROVIDER NOTE - MEDICAL DECISION MAKING DETAILS
Attending MD Amanda: 77 female with PMH CAD, PTCA with ALEJANDRA, MI 2007 with chest pain with walking a/w SOB.  Did not see her cardiologist.  Attending MD Amanda: A & O x 3, NAD, HEENT WNL and no facial asymmetry; lungs CTAB, heart with reg rhythm without murmur; abdomen soft NTND; extremities with no edema; skin with no rashes, neuro exam non focal with no motor or sensory deficits. Plan: Cardiac Monitor, EKG, Labs/cardiac enzymes, O2 2L NC, ASA, CXR and admit to telemetry for further workup. 77F PMH CAD s/p stents p/w chest pain and VILLA concerning for unstable angina. Check CXR CE give ASA cards c/s reassess. CDU vs admit  Attending MD Amanda: 77 female with PMH CAD, PTCA with ALEJANDRA, MI 2007 with chest pain with walking a/w SOB.  Did not see her cardiologist.  Attending MD Amanda: A & O x 3, NAD, HEENT WNL and no facial asymmetry; lungs CTAB, heart with reg rhythm without murmur; abdomen soft NTND; extremities with no edema; skin with no rashes, neuro exam non focal with no motor or sensory deficits. Plan: Cardiac Monitor, EKG, Labs/cardiac enzymes, O2 2L NC, ASA, CXR and admit to telemetry for further workup.

## 2017-11-07 NOTE — H&P ADULT - NSHPLABSRESULTS_GEN_ALL_CORE
Labs personally reviewed  no leukocytosis, otherwise nondx cbc, cmp with mild hyperglycemia otherwise nondx, first set CE negative x 1.     Imaging personally reviewed CXR prelim no emergent findings.       EKG personally reviewed borderline 1st deg (192) block, qw infer leads, n overt ischemia; similar to prior ekg.

## 2017-11-07 NOTE — H&P ADULT - HISTORY OF PRESENT ILLNESS
77 F PMH CAD with stents (2007, restenosis 2013) on DAPT, HTN, HLD, T2DM, multiple back surgeries c/b collapsed lung remotely, CARLOS, evelin, p/w 9 days of unstable angina. Pt notes that about 2 weeks ago, she was "cleared from cardiac standpoint" prior to o/p planned EGD/[place] for unrelated dysphagia. She notes at that time had no symptoms.  Starting about 9 days ago, she noted progressive exertional dyspnea, substernal non rad CP, and heart "fluttering"; sx were worst with ambulation up 2 sets of stairs, something she prev did with no issues.  At rest she had minimal sx. Denies LE edema.  +N, denies v.  Denies f/c/dysuria/cough/uri sx.      VS: 97.5, 62, 163/80, 18, 99% RA.  no leukocytosis, otherwise nondx cbc, cmp with mild hyperglycemia otherwise nondx, first set CE negative x 1. CXR prelim no emergent findings.   EKG borderline 1st deg (192) block, qw infer leads, n overt ischemia; similar to prior ekg. Cards eval in ED. Given asa/plavix load, started on hep gtt. 77 F PMH CAD with stents (2007, restenosis 2013) on DAPT, HTN, HLD, T2DM, multiple back surgeries c/b collapsed lung remotely, CARLOS, evelin, urinary incontinence, dysphagia p/w 9 days of unstable angina. Pt notes that about 2 weeks ago, she was "cleared from cardiac standpoint" prior to o/p planned EGD/[place] for unrelated dysphagia. She notes at that time had no symptoms.  Starting about 9 days ago, she noted progressive exertional dyspnea, substernal non rad CP, and heart "fluttering"; sx were worst with ambulation up 2 sets of stairs, something she prev did with no issues.  At rest she had minimal sx. Denies LE edema.  +N, denies v.  Denies f/c/dysuria/cough/uri sx.      VS: 97.5, 62, 163/80, 18, 99% RA.  Labs: no leukocytosis, otherwise nondx cbc, cmp with mild hyperglycemia otherwise nondx, first set CE negative x 1. CXR prelim no emergent findings.   EKG borderline 1st deg (192) block, qw infer leads, n overt ischemia; similar to prior ekg. Cards eval in ED. Given asa/plavix load, started on hep gtt.

## 2017-11-07 NOTE — CONSULT NOTE ADULT - NSHPATTENDINGPLANDISCUSS_GEN_ALL_CORE
Patient seen and examined; discussed with cardiology fellow. Hx., exam and labs as above.  I agree with the assessment and recommendations.                                          Obie Dye M.D.  Cardiology Consult Service  [phone] or [phone]

## 2017-11-08 DIAGNOSIS — Z71.89 OTHER SPECIFIED COUNSELING: ICD-10-CM

## 2017-11-08 LAB
ALBUMIN SERPL ELPH-MCNC: 3.8 G/DL — SIGNIFICANT CHANGE UP (ref 3.3–5)
ALP SERPL-CCNC: 84 U/L — SIGNIFICANT CHANGE UP (ref 40–120)
ALT FLD-CCNC: 19 U/L RC — SIGNIFICANT CHANGE UP (ref 10–45)
ANION GAP SERPL CALC-SCNC: 14 MMOL/L — SIGNIFICANT CHANGE UP (ref 5–17)
APTT BLD: 49.5 SEC — HIGH (ref 27.5–37.4)
APTT BLD: 65.8 SEC — HIGH (ref 27.5–37.4)
AST SERPL-CCNC: 21 U/L — SIGNIFICANT CHANGE UP (ref 10–40)
BASOPHILS # BLD AUTO: 0 K/UL — SIGNIFICANT CHANGE UP (ref 0–0.2)
BASOPHILS NFR BLD AUTO: 0.2 % — SIGNIFICANT CHANGE UP (ref 0–2)
BILIRUB SERPL-MCNC: 0.5 MG/DL — SIGNIFICANT CHANGE UP (ref 0.2–1.2)
BUN SERPL-MCNC: 19 MG/DL — SIGNIFICANT CHANGE UP (ref 7–23)
CALCIUM SERPL-MCNC: 9.2 MG/DL — SIGNIFICANT CHANGE UP (ref 8.4–10.5)
CHLORIDE SERPL-SCNC: 102 MMOL/L — SIGNIFICANT CHANGE UP (ref 96–108)
CO2 SERPL-SCNC: 22 MMOL/L — SIGNIFICANT CHANGE UP (ref 22–31)
CREAT SERPL-MCNC: 0.73 MG/DL — SIGNIFICANT CHANGE UP (ref 0.5–1.3)
EOSINOPHIL # BLD AUTO: 0.1 K/UL — SIGNIFICANT CHANGE UP (ref 0–0.5)
EOSINOPHIL NFR BLD AUTO: 1.3 % — SIGNIFICANT CHANGE UP (ref 0–6)
GLUCOSE BLDC GLUCOMTR-MCNC: 102 MG/DL — HIGH (ref 70–99)
GLUCOSE BLDC GLUCOMTR-MCNC: 111 MG/DL — HIGH (ref 70–99)
GLUCOSE BLDC GLUCOMTR-MCNC: 128 MG/DL — HIGH (ref 70–99)
GLUCOSE BLDC GLUCOMTR-MCNC: 171 MG/DL — HIGH (ref 70–99)
GLUCOSE SERPL-MCNC: 112 MG/DL — HIGH (ref 70–99)
HBA1C BLD-MCNC: 6.3 % — HIGH (ref 4–5.6)
HCT VFR BLD CALC: 42.7 % — SIGNIFICANT CHANGE UP (ref 34.5–45)
HCT VFR BLD CALC: 44.7 % — SIGNIFICANT CHANGE UP (ref 34.5–45)
HGB BLD-MCNC: 14.7 G/DL — SIGNIFICANT CHANGE UP (ref 11.5–15.5)
HGB BLD-MCNC: 15 G/DL — SIGNIFICANT CHANGE UP (ref 11.5–15.5)
LYMPHOCYTES # BLD AUTO: 2.6 K/UL — SIGNIFICANT CHANGE UP (ref 1–3.3)
LYMPHOCYTES # BLD AUTO: 28.8 % — SIGNIFICANT CHANGE UP (ref 13–44)
MAGNESIUM SERPL-MCNC: 2.2 MG/DL — SIGNIFICANT CHANGE UP (ref 1.6–2.6)
MCHC RBC-ENTMCNC: 30.6 PG — SIGNIFICANT CHANGE UP (ref 27–34)
MCHC RBC-ENTMCNC: 31.4 PG — SIGNIFICANT CHANGE UP (ref 27–34)
MCHC RBC-ENTMCNC: 33.6 GM/DL — SIGNIFICANT CHANGE UP (ref 32–36)
MCHC RBC-ENTMCNC: 34.6 GM/DL — SIGNIFICANT CHANGE UP (ref 32–36)
MCV RBC AUTO: 90.7 FL — SIGNIFICANT CHANGE UP (ref 80–100)
MCV RBC AUTO: 91 FL — SIGNIFICANT CHANGE UP (ref 80–100)
MONOCYTES # BLD AUTO: 1 K/UL — HIGH (ref 0–0.9)
MONOCYTES NFR BLD AUTO: 10.7 % — SIGNIFICANT CHANGE UP (ref 2–14)
NEUTROPHILS # BLD AUTO: 5.3 K/UL — SIGNIFICANT CHANGE UP (ref 1.8–7.4)
NEUTROPHILS NFR BLD AUTO: 59 % — SIGNIFICANT CHANGE UP (ref 43–77)
PHOSPHATE SERPL-MCNC: 3.5 MG/DL — SIGNIFICANT CHANGE UP (ref 2.5–4.5)
PLATELET # BLD AUTO: 196 K/UL — SIGNIFICANT CHANGE UP (ref 150–400)
PLATELET # BLD AUTO: 205 K/UL — SIGNIFICANT CHANGE UP (ref 150–400)
POTASSIUM SERPL-MCNC: 4 MMOL/L — SIGNIFICANT CHANGE UP (ref 3.5–5.3)
POTASSIUM SERPL-SCNC: 4 MMOL/L — SIGNIFICANT CHANGE UP (ref 3.5–5.3)
PROT SERPL-MCNC: 6.4 G/DL — SIGNIFICANT CHANGE UP (ref 6–8.3)
RBC # BLD: 4.7 M/UL — SIGNIFICANT CHANGE UP (ref 3.8–5.2)
RBC # BLD: 4.91 M/UL — SIGNIFICANT CHANGE UP (ref 3.8–5.2)
RBC # FLD: 12.8 % — SIGNIFICANT CHANGE UP (ref 10.3–14.5)
RBC # FLD: 12.9 % — SIGNIFICANT CHANGE UP (ref 10.3–14.5)
SODIUM SERPL-SCNC: 138 MMOL/L — SIGNIFICANT CHANGE UP (ref 135–145)
TROPONIN T SERPL-MCNC: <0.01 NG/ML — SIGNIFICANT CHANGE UP (ref 0–0.06)
WBC # BLD: 8.9 K/UL — SIGNIFICANT CHANGE UP (ref 3.8–10.5)
WBC # BLD: 9 K/UL — SIGNIFICANT CHANGE UP (ref 3.8–10.5)
WBC # FLD AUTO: 8.9 K/UL — SIGNIFICANT CHANGE UP (ref 3.8–10.5)
WBC # FLD AUTO: 9 K/UL — SIGNIFICANT CHANGE UP (ref 3.8–10.5)

## 2017-11-08 PROCEDURE — 99233 SBSQ HOSP IP/OBS HIGH 50: CPT | Mod: GC

## 2017-11-08 PROCEDURE — 92928 PRQ TCAT PLMT NTRAC ST 1 LES: CPT | Mod: RC

## 2017-11-08 PROCEDURE — 93010 ELECTROCARDIOGRAM REPORT: CPT

## 2017-11-08 PROCEDURE — 99152 MOD SED SAME PHYS/QHP 5/>YRS: CPT

## 2017-11-08 PROCEDURE — 93458 L HRT ARTERY/VENTRICLE ANGIO: CPT | Mod: 26,59

## 2017-11-08 RX ORDER — MIRABEGRON 50 MG/1
25 TABLET, EXTENDED RELEASE ORAL DAILY
Qty: 0 | Refills: 0 | Status: DISCONTINUED | OUTPATIENT
Start: 2017-11-08 | End: 2017-11-10

## 2017-11-08 RX ORDER — ROSUVASTATIN CALCIUM 5 MG/1
20 TABLET ORAL AT BEDTIME
Qty: 0 | Refills: 0 | Status: DISCONTINUED | OUTPATIENT
Start: 2017-11-08 | End: 2017-11-10

## 2017-11-08 RX ORDER — OMEPRAZOLE 10 MG/1
40 CAPSULE, DELAYED RELEASE ORAL DAILY
Qty: 0 | Refills: 0 | Status: DISCONTINUED | OUTPATIENT
Start: 2017-11-08 | End: 2017-11-10

## 2017-11-08 RX ORDER — FENTANYL CITRATE 50 UG/ML
25 INJECTION INTRAVENOUS ONCE
Qty: 0 | Refills: 0 | Status: DISCONTINUED | OUTPATIENT
Start: 2017-11-08 | End: 2017-11-08

## 2017-11-08 RX ORDER — ACETAMINOPHEN 500 MG
650 TABLET ORAL EVERY 6 HOURS
Qty: 0 | Refills: 0 | Status: DISCONTINUED | OUTPATIENT
Start: 2017-11-08 | End: 2017-11-10

## 2017-11-08 RX ORDER — ASPIRIN/CALCIUM CARB/MAGNESIUM 324 MG
81 TABLET ORAL DAILY
Qty: 0 | Refills: 0 | Status: DISCONTINUED | OUTPATIENT
Start: 2017-11-08 | End: 2017-11-10

## 2017-11-08 RX ORDER — HEPARIN SODIUM 5000 [USP'U]/ML
5000 INJECTION INTRAVENOUS; SUBCUTANEOUS EVERY 8 HOURS
Qty: 0 | Refills: 0 | Status: DISCONTINUED | OUTPATIENT
Start: 2017-11-08 | End: 2017-11-10

## 2017-11-08 RX ORDER — IRBESARTAN AND HYDROCHLOROTHIAZIDE 12.5; 3 MG/1; MG/1
1 TABLET ORAL DAILY
Qty: 0 | Refills: 0 | Status: DISCONTINUED | OUTPATIENT
Start: 2017-11-08 | End: 2017-11-10

## 2017-11-08 RX ADMIN — IRBESARTAN AND HYDROCHLOROTHIAZIDE 1 TABLET(S): 12.5; 3 TABLET ORAL at 05:42

## 2017-11-08 RX ADMIN — ROSUVASTATIN CALCIUM 20 MILLIGRAM(S): 5 TABLET ORAL at 05:45

## 2017-11-08 RX ADMIN — FENTANYL CITRATE 25 MICROGRAM(S): 50 INJECTION INTRAVENOUS at 14:40

## 2017-11-08 RX ADMIN — Medication 650 MILLIGRAM(S): at 17:41

## 2017-11-08 RX ADMIN — HEPARIN SODIUM 850 UNIT(S)/HR: 5000 INJECTION INTRAVENOUS; SUBCUTANEOUS at 01:17

## 2017-11-08 RX ADMIN — Medication 650 MILLIGRAM(S): at 18:29

## 2017-11-08 RX ADMIN — FENTANYL CITRATE 25 MICROGRAM(S): 50 INJECTION INTRAVENOUS at 14:55

## 2017-11-08 RX ADMIN — FENTANYL CITRATE 25 MICROGRAM(S): 50 INJECTION INTRAVENOUS at 14:25

## 2017-11-08 RX ADMIN — OMEPRAZOLE 40 MILLIGRAM(S): 10 CAPSULE, DELAYED RELEASE ORAL at 05:45

## 2017-11-08 RX ADMIN — AMLODIPINE BESYLATE 10 MILLIGRAM(S): 2.5 TABLET ORAL at 05:42

## 2017-11-08 RX ADMIN — CLOPIDOGREL BISULFATE 75 MILLIGRAM(S): 75 TABLET, FILM COATED ORAL at 07:53

## 2017-11-08 RX ADMIN — HEPARIN SODIUM 850 UNIT(S)/HR: 5000 INJECTION INTRAVENOUS; SUBCUTANEOUS at 07:53

## 2017-11-08 RX ADMIN — MIRABEGRON 25 MILLIGRAM(S): 50 TABLET, EXTENDED RELEASE ORAL at 05:46

## 2017-11-08 NOTE — PROGRESS NOTE ADULT - PROBLEM SELECTOR PLAN 3
-continue with fingersticks and sliding scale coverage   -can resume oral hypoglycemic agents on discharge

## 2017-11-08 NOTE — PROGRESS NOTE ADULT - PROBLEM SELECTOR PLAN 1
-patient underwent [facility] pRCA 30% ISR and RPL 90% X1 ALEJANDRA  -continue with ASA/PLavix/Crestor  -f/u official cath report, may need echo     - -patient underwent C pRCA 30% ISR and RPL 90% X1 ALEJANDRA  -continue with ASA/PLavix/Crestor  -f/u official cath report, may need echo  -monitor cath site

## 2017-11-08 NOTE — PROVIDER CONTACT NOTE (OTHER) - ACTION/TREATMENT ORDERED:
NP made aware. NP to assess patient. No further actions at this time. Continue to monitor patient. NP made aware. 12 lead ecg ordered. NP to assess patient. No further actions at this time. Continue to monitor patient.

## 2017-11-08 NOTE — PROVIDER CONTACT NOTE (OTHER) - ASSESSMENT
Patient A&Ox4, VSS. Patient is sinus bradycardia 50's on cardiac monitor. /74, RR 18, O2 saturation 97% on room air. Patient denies chest pain. Patient continues to have "fluttering in chest"- MD Howard and NP aware on admission to unit. Patient denies shortness of breath.

## 2017-11-08 NOTE — PROGRESS NOTE ADULT - SUBJECTIVE AND OBJECTIVE BOX
Patient is a 77y old  Female who presents with a chief complaint of CP/SOB (07 Nov 2017 22:13)      SUBJECTIVE / OVERNIGHT EVENTS: no acute events overnight     MEDICATIONS  (STANDING):  amLODIPine   Tablet 10 milliGRAM(s) Oral daily  aspirin enteric coated 81 milliGRAM(s) Oral daily  dextrose 5%. 1000 milliLiter(s) (50 mL/Hr) IV Continuous <Continuous>  dextrose 50% Injectable 12.5 Gram(s) IV Push once  dextrose 50% Injectable 25 Gram(s) IV Push once  dextrose 50% Injectable 25 Gram(s) IV Push once  influenza   Vaccine 0.5 milliLiter(s) IntraMuscular once  insulin lispro (HumaLOG) corrective regimen sliding scale   SubCutaneous three times a day before meals  insulin lispro (HumaLOG) corrective regimen sliding scale   SubCutaneous at bedtime  irbesartan 300 mG/hydrochlorothiazide 12.5 mG 1 Tablet(s) Oral daily  mirabegron ER 25 milliGRAM(s) Oral daily  omeprazole 40 milliGRAM(s) Oral daily  rosuvastatin 20 milliGRAM(s) Oral at bedtime    MEDICATIONS  (PRN):  dextrose Gel 1 Dose(s) Oral once PRN Blood Glucose LESS THAN 70 milliGRAM(s)/deciliter  glucagon  Injectable 1 milliGRAM(s) IntraMuscular once PRN Glucose LESS THAN 70 milligrams/deciliter      Vital Signs Last 24 Hrs  T(C): 36.9 (08 Nov 2017 04:13), Max: 36.9 (08 Nov 2017 04:13)  T(F): 98.5 (08 Nov 2017 04:13), Max: 98.5 (08 Nov 2017 04:13)  HR: 50 (08 Nov 2017 04:13) (50 - 64)  BP: 149/70 (08 Nov 2017 04:13) (136/74 - 165/78)  BP(mean): --  RR: 18 (08 Nov 2017 04:13) (18 - 20)  SpO2: 97% (08 Nov 2017 04:13) (97% - 99%)  CAPILLARY BLOOD GLUCOSE      POCT Blood Glucose.: 111 mg/dL (08 Nov 2017 08:59)  POCT Blood Glucose.: 142 mg/dL (07 Nov 2017 21:04)    I&O's Summary    07 Nov 2017 07:01  -  08 Nov 2017 07:00  --------------------------------------------------------  IN: 521 mL / OUT: 0 mL / NET: 521 mL        PHYSICAL EXAM:  GENERAL: NAD, well-developed  HEAD:  Atraumatic, Normocephalic  EYES: EOMI, PERRLA, conjunctiva and sclera clear  NECK: Supple, No JVD  CHEST/LUNG: Clear to auscultation bilaterally; No wheeze  HEART: Regular rate and rhythm; No murmurs, rubs, or gallops  ABDOMEN: Soft, Nontender, Nondistended; Bowel sounds present  EXTREMITIES:  2+ Peripheral Pulses, No clubbing, cyanosis, or edema  PSYCH: AAOx3  NEUROLOGY: non-focal  SKIN: No rashes or lesions    LABS:                        15.0   8.9   )-----------( 205      ( 08 Nov 2017 07:05 )             44.7     11-08    138  |  102  |  19  ----------------------------<  112<H>  4.0   |  22  |  0.73    Ca    9.2      08 Nov 2017 07:05  Phos  3.5     11-08  Mg     2.2     11-08    TPro  6.4  /  Alb  3.8  /  TBili  0.5  /  DBili  x   /  AST  21  /  ALT  19  /  AlkPhos  84  11-08    PTT - ( 08 Nov 2017 07:05 )  PTT:65.8 sec  CARDIAC MARKERS ( 08 Nov 2017 07:05 )  x     / <0.01 ng/mL / x     / x     / x      CARDIAC MARKERS ( 07 Nov 2017 22:06 )  x     / <0.01 ng/mL / 28 U/L / x     / 2.0 ng/mL  CARDIAC MARKERS ( 07 Nov 2017 15:12 )  x     / <0.01 ng/mL / 36 U/L / x     / 2.1 ng/mL          RADIOLOGY & ADDITIONAL TESTS:    Imaging Personally Reviewed:    Consultant(s) Notes Reviewed:      Care Discussed with Consultants/Other Providers: Patient is a 77y old  Female who presents with a chief complaint of CP/SOB (07 Nov 2017 22:13)      SUBJECTIVE / OVERNIGHT EVENTS: no acute events overnight. Patient states that she felt a little "fluttering" after having the cath, but that otherwise she feels well. She denies any chest pain.     MEDICATIONS  (STANDING):  amLODIPine   Tablet 10 milliGRAM(s) Oral daily  aspirin enteric coated 81 milliGRAM(s) Oral daily  dextrose 5%. 1000 milliLiter(s) (50 mL/Hr) IV Continuous <Continuous>  dextrose 50% Injectable 12.5 Gram(s) IV Push once  dextrose 50% Injectable 25 Gram(s) IV Push once  dextrose 50% Injectable 25 Gram(s) IV Push once  influenza   Vaccine 0.5 milliLiter(s) IntraMuscular once  insulin lispro (HumaLOG) corrective regimen sliding scale   SubCutaneous three times a day before meals  insulin lispro (HumaLOG) corrective regimen sliding scale   SubCutaneous at bedtime  irbesartan 300 mG/hydrochlorothiazide 12.5 mG 1 Tablet(s) Oral daily  mirabegron ER 25 milliGRAM(s) Oral daily  omeprazole 40 milliGRAM(s) Oral daily  rosuvastatin 20 milliGRAM(s) Oral at bedtime    MEDICATIONS  (PRN):  dextrose Gel 1 Dose(s) Oral once PRN Blood Glucose LESS THAN 70 milliGRAM(s)/deciliter  glucagon  Injectable 1 milliGRAM(s) IntraMuscular once PRN Glucose LESS THAN 70 milligrams/deciliter      Vital Signs Last 24 Hrs  T(C): 36.9 (08 Nov 2017 04:13), Max: 36.9 (08 Nov 2017 04:13)  T(F): 98.5 (08 Nov 2017 04:13), Max: 98.5 (08 Nov 2017 04:13)  HR: 50 (08 Nov 2017 04:13) (50 - 64)  BP: 149/70 (08 Nov 2017 04:13) (136/74 - 165/78)  BP(mean): --  RR: 18 (08 Nov 2017 04:13) (18 - 20)  SpO2: 97% (08 Nov 2017 04:13) (97% - 99%)  CAPILLARY BLOOD GLUCOSE      POCT Blood Glucose.: 111 mg/dL (08 Nov 2017 08:59)  POCT Blood Glucose.: 142 mg/dL (07 Nov 2017 21:04)    I&O's Summary    07 Nov 2017 07:01  -  08 Nov 2017 07:00  --------------------------------------------------------  IN: 521 mL / OUT: 0 mL / NET: 521 mL        PHYSICAL EXAM:  GENERAL: NAD, resting comfortably   HEAD:  Atraumatic, Normocephalic  EYES: EOMI, PERRLA, conjunctiva and sclera clear  NECK: Supple  CHEST/LUNG: Clear to auscultation bilaterally; No wheeze  HEART: Regular rate and rhythm; No murmurs  ABDOMEN: Soft, Nontender, Nondistended; Bowel sounds present  EXTREMITIES:  2+ Peripheral Pulses, No clubbing, cyanosis, or edema. R groin dressing in place.   PSYCH: AAOx3  NEUROLOGY: non-focal  SKIN: No rashes or lesions    LABS:                        15.0   8.9   )-----------( 205      ( 08 Nov 2017 07:05 )             44.7     11-08    138  |  102  |  19  ----------------------------<  112<H>  4.0   |  22  |  0.73    Ca    9.2      08 Nov 2017 07:05  Phos  3.5     11-08  Mg     2.2     11-08    TPro  6.4  /  Alb  3.8  /  TBili  0.5  /  DBili  x   /  AST  21  /  ALT  19  /  AlkPhos  84  11-08    PTT - ( 08 Nov 2017 07:05 )  PTT:65.8 sec  CARDIAC MARKERS ( 08 Nov 2017 07:05 )  x     / <0.01 ng/mL / x     / x     / x      CARDIAC MARKERS ( 07 Nov 2017 22:06 )  x     / <0.01 ng/mL / 28 U/L / x     / 2.0 ng/mL  CARDIAC MARKERS ( 07 Nov 2017 15:12 )  x     / <0.01 ng/mL / 36 U/L / x     / 2.1 ng/mL            Consultant(s) Notes Reviewed:  cardiology

## 2017-11-08 NOTE — CHART NOTE - NSCHARTNOTEFT_GEN_A_CORE
Removal of Femoral Sheath    Pulses in the right lower extremity are palpable. The patient was placed in the supine position. The insertion site was identified and the sutures were removed per protocol.  The ___6_ [place] femoral sheath was then removed. Patient developed hematoma at the site. Direct pressure was applied for  ____50__ minutes.     Monitoring of the (right/left) groin and both lower extremities including neuro-vascular checks and vital signs every 15 minutes x 4, then every 30 minutes x 2, then every 1 hour was ordered.    Complications: Developed hematoma at the site. Manual compression applied for 30 minutes,  Dr. Hernandez at bedside and manual compression continued for 20 more minutes and hematoma resolved. will continue to monitor the patient. Bed rest x 6 hours.    Comments: pedal pulse palpbale, patient denies any leg/ chest pain.

## 2017-11-09 DIAGNOSIS — S30.1XXA CONTUSION OF ABDOMINAL WALL, INITIAL ENCOUNTER: ICD-10-CM

## 2017-11-09 LAB
ANION GAP SERPL CALC-SCNC: 15 MMOL/L — SIGNIFICANT CHANGE UP (ref 5–17)
BASOPHILS # BLD AUTO: 0 K/UL — SIGNIFICANT CHANGE UP (ref 0–0.2)
BASOPHILS NFR BLD AUTO: 0.2 % — SIGNIFICANT CHANGE UP (ref 0–2)
BUN SERPL-MCNC: 14 MG/DL — SIGNIFICANT CHANGE UP (ref 7–23)
CALCIUM SERPL-MCNC: 9.4 MG/DL — SIGNIFICANT CHANGE UP (ref 8.4–10.5)
CHLORIDE SERPL-SCNC: 103 MMOL/L — SIGNIFICANT CHANGE UP (ref 96–108)
CHOLEST SERPL-MCNC: 158 MG/DL — SIGNIFICANT CHANGE UP (ref 10–199)
CO2 SERPL-SCNC: 24 MMOL/L — SIGNIFICANT CHANGE UP (ref 22–31)
CREAT SERPL-MCNC: 0.63 MG/DL — SIGNIFICANT CHANGE UP (ref 0.5–1.3)
EOSINOPHIL # BLD AUTO: 0.1 K/UL — SIGNIFICANT CHANGE UP (ref 0–0.5)
EOSINOPHIL NFR BLD AUTO: 1.1 % — SIGNIFICANT CHANGE UP (ref 0–6)
GLUCOSE BLDC GLUCOMTR-MCNC: 120 MG/DL — HIGH (ref 70–99)
GLUCOSE BLDC GLUCOMTR-MCNC: 120 MG/DL — HIGH (ref 70–99)
GLUCOSE BLDC GLUCOMTR-MCNC: 127 MG/DL — HIGH (ref 70–99)
GLUCOSE BLDC GLUCOMTR-MCNC: 149 MG/DL — HIGH (ref 70–99)
GLUCOSE SERPL-MCNC: 105 MG/DL — HIGH (ref 70–99)
HCT VFR BLD CALC: 43.2 % — SIGNIFICANT CHANGE UP (ref 34.5–45)
HDLC SERPL-MCNC: 61 MG/DL — SIGNIFICANT CHANGE UP (ref 40–125)
HGB BLD-MCNC: 14.5 G/DL — SIGNIFICANT CHANGE UP (ref 11.5–15.5)
LIPID PNL WITH DIRECT LDL SERPL: 72 MG/DL — SIGNIFICANT CHANGE UP
LYMPHOCYTES # BLD AUTO: 1.9 K/UL — SIGNIFICANT CHANGE UP (ref 1–3.3)
LYMPHOCYTES # BLD AUTO: 24.2 % — SIGNIFICANT CHANGE UP (ref 13–44)
MAGNESIUM SERPL-MCNC: 2 MG/DL — SIGNIFICANT CHANGE UP (ref 1.6–2.6)
MCHC RBC-ENTMCNC: 30.6 PG — SIGNIFICANT CHANGE UP (ref 27–34)
MCHC RBC-ENTMCNC: 33.5 GM/DL — SIGNIFICANT CHANGE UP (ref 32–36)
MCV RBC AUTO: 91.4 FL — SIGNIFICANT CHANGE UP (ref 80–100)
MONOCYTES # BLD AUTO: 0.9 K/UL — SIGNIFICANT CHANGE UP (ref 0–0.9)
MONOCYTES NFR BLD AUTO: 11.9 % — SIGNIFICANT CHANGE UP (ref 2–14)
NEUTROPHILS # BLD AUTO: 4.9 K/UL — SIGNIFICANT CHANGE UP (ref 1.8–7.4)
NEUTROPHILS NFR BLD AUTO: 62.6 % — SIGNIFICANT CHANGE UP (ref 43–77)
PHOSPHATE SERPL-MCNC: 3.9 MG/DL — SIGNIFICANT CHANGE UP (ref 2.5–4.5)
PHOSPHATE SERPL-MCNC: <0.3 MG/DL — CRITICAL LOW (ref 2.5–4.5)
PLATELET # BLD AUTO: 181 K/UL — SIGNIFICANT CHANGE UP (ref 150–400)
POTASSIUM SERPL-MCNC: 3.9 MMOL/L — SIGNIFICANT CHANGE UP (ref 3.5–5.3)
POTASSIUM SERPL-SCNC: 3.9 MMOL/L — SIGNIFICANT CHANGE UP (ref 3.5–5.3)
RBC # BLD: 4.72 M/UL — SIGNIFICANT CHANGE UP (ref 3.8–5.2)
RBC # FLD: 13 % — SIGNIFICANT CHANGE UP (ref 10.3–14.5)
SODIUM SERPL-SCNC: 142 MMOL/L — SIGNIFICANT CHANGE UP (ref 135–145)
TOTAL CHOLESTEROL/HDL RATIO MEASUREMENT: 2.6 RATIO — LOW (ref 3.3–7.1)
TRIGL SERPL-MCNC: 124 MG/DL — SIGNIFICANT CHANGE UP (ref 10–149)
TSH SERPL-MCNC: 1.54 UIU/ML — SIGNIFICANT CHANGE UP (ref 0.27–4.2)
WBC # BLD: 7.8 K/UL — SIGNIFICANT CHANGE UP (ref 3.8–10.5)
WBC # FLD AUTO: 7.8 K/UL — SIGNIFICANT CHANGE UP (ref 3.8–10.5)

## 2017-11-09 PROCEDURE — 99233 SBSQ HOSP IP/OBS HIGH 50: CPT

## 2017-11-09 PROCEDURE — 99233 SBSQ HOSP IP/OBS HIGH 50: CPT | Mod: GC

## 2017-11-09 PROCEDURE — 93010 ELECTROCARDIOGRAM REPORT: CPT

## 2017-11-09 RX ADMIN — ROSUVASTATIN CALCIUM 20 MILLIGRAM(S): 5 TABLET ORAL at 21:09

## 2017-11-09 RX ADMIN — AMLODIPINE BESYLATE 10 MILLIGRAM(S): 2.5 TABLET ORAL at 06:06

## 2017-11-09 RX ADMIN — HEPARIN SODIUM 5000 UNIT(S): 5000 INJECTION INTRAVENOUS; SUBCUTANEOUS at 21:10

## 2017-11-09 RX ADMIN — HEPARIN SODIUM 5000 UNIT(S): 5000 INJECTION INTRAVENOUS; SUBCUTANEOUS at 09:42

## 2017-11-09 RX ADMIN — HEPARIN SODIUM 5000 UNIT(S): 5000 INJECTION INTRAVENOUS; SUBCUTANEOUS at 18:27

## 2017-11-09 RX ADMIN — CLOPIDOGREL BISULFATE 75 MILLIGRAM(S): 75 TABLET, FILM COATED ORAL at 13:26

## 2017-11-09 RX ADMIN — Medication 81 MILLIGRAM(S): at 13:27

## 2017-11-09 RX ADMIN — INFLUENZA VIRUS VACCINE 0.5 MILLILITER(S): 15; 15; 15; 15 SUSPENSION INTRAMUSCULAR at 17:07

## 2017-11-09 RX ADMIN — IRBESARTAN AND HYDROCHLOROTHIAZIDE 1 TABLET(S): 12.5; 3 TABLET ORAL at 06:10

## 2017-11-09 RX ADMIN — OMEPRAZOLE 40 MILLIGRAM(S): 10 CAPSULE, DELAYED RELEASE ORAL at 13:26

## 2017-11-09 RX ADMIN — MIRABEGRON 25 MILLIGRAM(S): 50 TABLET, EXTENDED RELEASE ORAL at 13:26

## 2017-11-09 NOTE — PROGRESS NOTE ADULT - PROBLEM SELECTOR PLAN 2
-patient underwent [facility] pRCA 30% ISR and RPL 90% X1 ALEJANDRA  -continue with ASA/PLavix/Crestor

## 2017-11-09 NOTE — PROGRESS NOTE ADULT - SUBJECTIVE AND OBJECTIVE BOX
Date of Admission: 11/7/17     24H hour events:  Yesterday she underwent LHC via R. femoral with   pRCA 30% ISR  RPL 90% X1 ALEJANDRA Post procedure she developed R. hematoma that was compressed manually for 1 hour with resolution.       This am she feels well but still notes bothersome R. groin tenderness. No associated paresthesias.     MEDICATIONS:  amLODIPine   Tablet 10 milliGRAM(s) Oral daily  aspirin enteric coated 81 milliGRAM(s) Oral daily  heparin  Injectable 5000 Unit(s) SubCutaneous every 8 hours  irbesartan 300 mG/hydrochlorothiazide 12.5 mG 1 Tablet(s) Oral daily        acetaminophen   Tablet. 650 milliGRAM(s) Oral every 6 hours PRN    omeprazole 40 milliGRAM(s) Oral daily    dextrose 50% Injectable 12.5 Gram(s) IV Push once  dextrose 50% Injectable 25 Gram(s) IV Push once  dextrose 50% Injectable 25 Gram(s) IV Push once  dextrose Gel 1 Dose(s) Oral once PRN  glucagon  Injectable 1 milliGRAM(s) IntraMuscular once PRN  insulin lispro (HumaLOG) corrective regimen sliding scale   SubCutaneous three times a day before meals  insulin lispro (HumaLOG) corrective regimen sliding scale   SubCutaneous at bedtime  rosuvastatin 20 milliGRAM(s) Oral at bedtime    dextrose 5%. 1000 milliLiter(s) IV Continuous <Continuous>  influenza   Vaccine 0.5 milliLiter(s) IntraMuscular once  mirabegron ER 25 milliGRAM(s) Oral daily    REVIEW OF SYSTEMS:    CONSTITUTIONAL: No weakness, fevers or chills  EYES/ENT: No visual changes;  No dysphagia  NECK: No pain or stiffness  RESPIRATORY: No cough, wheezing, hemoptysis; No shortness of breath  CARDIOVASCULAR: No chest pain or palpitations; No lower extremity edema  GASTROINTESTINAL: No abdominal or epigastric pain. No nausea, vomiting, or hematemesis; No diarrhea or constipation. No melena or hematochezia.  BACK: No back pain  GENITOURINARY: No dysuria, frequency or hematuria  NEUROLOGICAL: No numbness or weakness  SKIN: No itching, burning, rashes, or lesions   All other review of systems is negative unless indicated above.    PHYSICAL EXAM:  T(C): 36.4 (11-09-17 @ 04:32), Max: 36.5 (11-08-17 @ 21:44)  HR: 56 (11-09-17 @ 05:00) (46 - 70)  BP: 146/78 (11-09-17 @ 04:32) (117/65 - 155/72)  RR: 18 (11-09-17 @ 04:32) (18 - 18)  SpO2: 95% (11-09-17 @ 04:32) (95% - 97%)  Wt(kg): --  I&O's Summary    08 Nov 2017 07:01  -  09 Nov 2017 07:00  --------------------------------------------------------  IN: 420 mL / OUT: 0 mL / NET: 420 mL    Appearance: Normal, pleasant, resting flat comfortably   HEENT:   Normal oral mucosa, PERRL, EOMI	  Lymphatic: No lymphadenopathy  Cardiovascular: Normal S1 S2, No JVD, No murmurs, No edema  Respiratory: Lungs clear to auscultation	  Psychiatry: A & O x 3, Mood & affect appropriate  Gastrointestinal:  Soft, Non-tender, + BS	  Skin: No rashes, No ecchymoses, No cyanosis	  Neurologic: Non-focal  Extremities: Normal range of motion, No clubbing, cyanosis or edema  Vascular: Peripheral pulses palpable 2+ bilaterally, R groin tender to palpation over ecchymoses post R. groin hematoma; Ecchymoses noted laterally and medially toward pubic region     LABS:	 	    CBC Full  -  ( 09 Nov 2017 06:54 )  WBC Count : 7.8 K/uL  Hemoglobin : 14.5 g/dL  Hematocrit : 43.2 %  Platelet Count - Automated : 181 K/uL  Mean Cell Volume : 91.4 fl  Mean Cell Hemoglobin : 30.6 pg  Mean Cell Hemoglobin Concentration : 33.5 gm/dL  Auto Neutrophil # : 4.9 K/uL  Auto Lymphocyte # : 1.9 K/uL  Auto Monocyte # : 0.9 K/uL  Auto Eosinophil # : 0.1 K/uL  Auto Basophil # : 0.0 K/uL  Auto Neutrophil % : 62.6 %  Auto Lymphocyte % : 24.2 %  Auto Monocyte % : 11.9 %  Auto Eosinophil % : 1.1 %  Auto Basophil % : 0.2 %    11-09    142  |  103  |  14  ----------------------------<  105<H>  3.9   |  24  |  0.63  11-08    138  |  102  |  19  ----------------------------<  112<H>  4.0   |  22  |  0.73    Ca    9.4      09 Nov 2017 06:54  Ca    9.2      08 Nov 2017 07:05  Phos  3.5     11-08  Mg     2.0     11-09  Mg     2.2     11-08    TPro  6.4  /  Alb  3.8  /  TBili  0.5  /  DBili  x   /  AST  21  /  ALT  19  /  AlkPhos  84  11-08  TPro  7.7  /  Alb  4.5  /  TBili  0.5  /  DBili  x   /  AST  23  /  ALT  21  /  AlkPhos  100  11-07      proBNP: Serum Pro-Brain Natriuretic Peptide: 42 pg/mL (11-07 @ 15:12)    TELEMETRY: 	  SR 90s 	    ASSESSMENT/PLAN: 	    77F w/HTN, HLD, GERD, Chronic R. brachial a. occlusion, sinus bradycardia, pulmonary fibrosis.  Hx. of CAD s/p PCI mRCA and hx of instent restenosis of the R. postero lateral branch, managed with ALEJANDRA  Now p/w progressive chest pain and VILLA c/w unstable angina s/p C with ALEJANDRA RPL    #Unstable angina; hx of CAD s/p prior PCI/ALEJANDRA. RPL.   - continue ASA, heparin gtt, Plavix   - Notable groin hematoma compressed manually, groin today is soft with notable ecchymoses slightly tender on exam. pulses palpable. Will d/w Dr. Viera and cath team.      #HTN - c/w home meds     #HLD - continue statin    ALVA Mena MD  Cardiology fellow  [id number] Date of Admission: 11/7/17     24H hour events:  Yesterday she underwent LHC via R. femoral with pRCA 30% ISR; RPL 90% X1 ALEJANDRA.  Post procedure, she developed R. hematoma that was compressed manually for 1 hour with hemostasis.   This am she feels well, but still notes bothersome R. groin tenderness. No associated paresthesias.     MEDICATIONS:  amLODIPine   Tablet 10 milliGRAM(s) Oral daily  aspirin enteric coated 81 milliGRAM(s) Oral daily  heparin  Injectable 5000 Unit(s) SubCutaneous every 8 hours  irbesartan 300 mG/hydrochlorothiazide 12.5 mG 1 Tablet(s) Oral daily  acetaminophen   Tablet. 650 milliGRAM(s) Oral every 6 hours PRN  omeprazole 40 milliGRAM(s) Oral daily  dextrose 50% Injectable 12.5 Gram(s) IV Push once  dextrose 50% Injectable 25 Gram(s) IV Push once  dextrose 50% Injectable 25 Gram(s) IV Push once  dextrose Gel 1 Dose(s) Oral once PRN  glucagon  Injectable 1 milliGRAM(s) IntraMuscular once PRN  insulin lispro (HumaLOG) corrective regimen sliding scale   SubCutaneous three times a day before meals  insulin lispro (HumaLOG) corrective regimen sliding scale   SubCutaneous at bedtime  rosuvastatin 20 milliGRAM(s) Oral at bedtime  dextrose 5%. 1000 milliLiter(s) IV Continuous <Continuous>  influenza   Vaccine 0.5 milliLiter(s) IntraMuscular once  mirabegron ER 25 milliGRAM(s) Oral daily    REVIEW OF SYSTEMS:  CONSTITUTIONAL: No weakness, fevers or chills  EYES/ENT: No visual changes;  No dysphagia  NECK: No pain or stiffness  RESPIRATORY: No cough, wheezing, hemoptysis; No shortness of breath  CARDIOVASCULAR: No chest pain or palpitations; No lower extremity edema  GASTROINTESTINAL: No abdominal or epigastric pain. No nausea, vomiting, or hematemesis; No diarrhea or constipation. No melena or hematochezia.  BACK: No back pain  GENITOURINARY: No dysuria, frequency or hematuria  NEUROLOGICAL: No numbness or weakness  SKIN: No itching, burning, rashes, or lesions   All other review of systems is negative unless indicated above.    PHYSICAL EXAM:  T(C): 36.4 (11-09-17 @ 04:32), Max: 36.5 (11-08-17 @ 21:44)  HR: 56 (11-09-17 @ 05:00) (46 - 70)  BP: 146/78 (11-09-17 @ 04:32) (117/65 - 155/72)  RR: 18 (11-09-17 @ 04:32) (18 - 18)  SpO2: 95% (11-09-17 @ 04:32) (95% - 97%)    Appearance: Normal, pleasant, resting flat comfortably   HEENT:   Normal oral mucosa, PERRL, EOMI	  Lymphatic: No lymphadenopathy  Cardiovascular: Normal S1 S2, No JVD, No murmurs, No edema  Respiratory: Lungs clear to auscultation	  Psychiatry: A & O x 3, Mood & affect appropriate  Gastrointestinal:  Soft, Non-tender, + BS	  Skin: No rashes, No ecchymoses, No cyanosis	  Neurologic: Non-focal  Extremities: Normal range of motion, No clubbing, cyanosis or edema  Vascular: Peripheral pulses palpable 2+ bilaterally, R groin tender to palpation over ecchymoses post R. groin hematoma; Ecchymoses noted laterally and medially toward pubic region     TELEMETRY: 	  SR 90s     LABS:	 	    CBC Full  -  ( 09 Nov 2017 06:54 )  WBC Count : 7.8 K/uL  Hemoglobin : 14.5 g/dL  Hematocrit : 43.2 %  Platelet Count - Automated : 181 K/uL  Mean Cell Volume : 91.4 fl  Mean Cell Hemoglobin : 30.6 pg  Mean Cell Hemoglobin Concentration : 33.5 gm/dL  Auto Neutrophil # : 4.9 K/uL  Auto Lymphocyte # : 1.9 K/uL  Auto Monocyte # : 0.9 K/uL  Auto Eosinophil # : 0.1 K/uL  Auto Basophil # : 0.0 K/uL  Auto Neutrophil % : 62.6 %  Auto Lymphocyte % : 24.2 %  Auto Monocyte % : 11.9 %  Auto Eosinophil % : 1.1 %  Auto Basophil % : 0.2 %    11-09  142  |  103  |  14  ----------------------------<  105<H>  3.9   |  24  |  0.63    11-08  138  |  102  |  19  ----------------------------<  112<H>  4.0   |  22  |  0.73    Ca    9.4      09 Nov 2017 06:54  Ca    9.2      08 Nov 2017 07:05    Phos  3.5     11-08  Mg     2.0     11-09  Mg     2.2     11-08    TPro  6.4  /  Alb  3.8  /  TBili  0.5  /  DBili  x   /  AST  21  /  ALT  19  /  AlkPhos  84  11-08    ASSESSMENT/PLAN: 	    77F w/HTN, HLD, GERD, Chronic R. brachial a. occlusion, sinus bradycardia, pulmonary fibrosis.  Hx. of CAD s/p PCI mRCA and hx of instent restenosis of the R. postero lateral branch, managed with ALEJANDRA  Now p/w progressive chest pain and VILLA c/w unstable angina; s/p LHC with ALEJANDRA RPL    #Unstable angina; s/p PCI/ALEJANDRA.   - continue ASA & Plavix   - Notable groin hematoma compressed manually, groin today is soft with notable ecchymoses slightly tender on exam. pulses palpable. Will d/w Dr. Viera and cath team.      #HTN - c/w home meds     #HLD - continue statin    ALVA Mena MD  Cardiology fellow  [phone]

## 2017-11-09 NOTE — PROVIDER CONTACT NOTE (OTHER) - ASSESSMENT
Patient A&Ox4, VSS. HR 63, /70, RR 18, O2 saturation 95% on room air. Patient complaining of "fluttering in chest." Patient states had experienced this prior to getting her cath done. Patient states fluttering is in the anterior of her chest, non radiating.

## 2017-11-09 NOTE — PROGRESS NOTE ADULT - PROBLEM SELECTOR PLAN 1
-R groin hematoma after cardiac cath yesterday   -LE pulses are intact b/l, no weakness on exam   -will obtain u/s to evaluate for psueoduaneurysm   -monitor clinically

## 2017-11-09 NOTE — PROGRESS NOTE ADULT - SUBJECTIVE AND OBJECTIVE BOX
Patient is a 77y old  Female who presents with a chief complaint of CP/SOB (07 Nov 2017 22:13)      SUBJECTIVE / OVERNIGHT EVENTS: patient reports soreness at the right groin cath site. She denies pain, numbness or tingling in her legs. She has not had any further chest pressure or discomfort.     MEDICATIONS  (STANDING):  amLODIPine   Tablet 10 milliGRAM(s) Oral daily  aspirin enteric coated 81 milliGRAM(s) Oral daily  clopidogrel Tablet 75 milliGRAM(s) Oral daily  dextrose 5%. 1000 milliLiter(s) (50 mL/Hr) IV Continuous <Continuous>  dextrose 50% Injectable 12.5 Gram(s) IV Push once  dextrose 50% Injectable 25 Gram(s) IV Push once  dextrose 50% Injectable 25 Gram(s) IV Push once  heparin  Injectable 5000 Unit(s) SubCutaneous every 8 hours  influenza   Vaccine 0.5 milliLiter(s) IntraMuscular once  insulin lispro (HumaLOG) corrective regimen sliding scale   SubCutaneous three times a day before meals  insulin lispro (HumaLOG) corrective regimen sliding scale   SubCutaneous at bedtime  irbesartan 300 mG/hydrochlorothiazide 12.5 mG 1 Tablet(s) Oral daily  mirabegron ER 25 milliGRAM(s) Oral daily  omeprazole 40 milliGRAM(s) Oral daily  rosuvastatin 20 milliGRAM(s) Oral at bedtime    MEDICATIONS  (PRN):  acetaminophen   Tablet. 650 milliGRAM(s) Oral every 6 hours PRN Moderate Pain (4 - 6)  dextrose Gel 1 Dose(s) Oral once PRN Blood Glucose LESS THAN 70 milliGRAM(s)/deciliter  glucagon  Injectable 1 milliGRAM(s) IntraMuscular once PRN Glucose LESS THAN 70 milligrams/deciliter      Vital Signs Last 24 Hrs  T(C): 36.7 (09 Nov 2017 12:06), Max: 36.7 (09 Nov 2017 12:06)  T(F): 98.1 (09 Nov 2017 12:06), Max: 98.1 (09 Nov 2017 12:06)  HR: 69 (09 Nov 2017 12:06) (46 - 70)  BP: 117/73 (09 Nov 2017 12:06) (117/65 - 155/72)  BP(mean): --  RR: 18 (09 Nov 2017 12:06) (18 - 18)  SpO2: 97% (09 Nov 2017 12:06) (95% - 97%)  CAPILLARY BLOOD GLUCOSE      POCT Blood Glucose.: 149 mg/dL (09 Nov 2017 13:08)  POCT Blood Glucose.: 127 mg/dL (09 Nov 2017 09:14)  POCT Blood Glucose.: 171 mg/dL (08 Nov 2017 21:37)  POCT Blood Glucose.: 128 mg/dL (08 Nov 2017 18:11)  POCT Blood Glucose.: 102 mg/dL (08 Nov 2017 16:10)    I&O's Summary    08 Nov 2017 07:01  -  09 Nov 2017 07:00  --------------------------------------------------------  IN: 420 mL / OUT: 0 mL / NET: 420 mL        PHYSICAL EXAM:  GENERAL: NAD, resting comfortably   HEAD:  Atraumatic, Normocephalic  EYES: EOMI, PERRLA, conjunctiva and sclera clear  NECK: Supple, No JVD  CHEST/LUNG: Clear to auscultation bilaterally; No wheeze  HEART: Regular rate and rhythm; No murmurs  ABDOMEN: Soft, Nontender, Nondistended; Bowel sounds present  EXTREMITIES:  2+ Peripheral Pulses, No clubbing. Dressing in place over right groin cath site with extensive surrounding ecchymosis.   PSYCH: AAOx3  NEUROLOGY: non-focal      LABS:                        14.5   7.8   )-----------( 181      ( 09 Nov 2017 06:54 )             43.2     11-09    142  |  103  |  14  ----------------------------<  105<H>  3.9   |  24  |  0.63    Ca    9.4      09 Nov 2017 06:54  Phos  <0.3     11-09  Mg     2.0     11-09    TPro  6.4  /  Alb  3.8  /  TBili  0.5  /  DBili  x   /  AST  21  /  ALT  19  /  AlkPhos  84  11-08    PTT - ( 08 Nov 2017 07:05 )  PTT:65.8 sec  CARDIAC MARKERS ( 08 Nov 2017 07:05 )  x     / <0.01 ng/mL / x     / x     / x      CARDIAC MARKERS ( 07 Nov 2017 22:06 )  x     / <0.01 ng/mL / 28 U/L / x     / 2.0 ng/mL  CARDIAC MARKERS ( 07 Nov 2017 15:12 )  x     / <0.01 ng/mL / 36 U/L / x     / 2.1 ng/mL          Care Discussed with Consultants/Other Providers: Cardiology Fellow

## 2017-11-09 NOTE — PROVIDER CONTACT NOTE (OTHER) - ACTION/TREATMENT ORDERED:
MD made aware. As per MD- feeling may be residuals from cardiac cath. No further actions at this time. Continue to monitor patient.

## 2017-11-10 ENCOUNTER — TRANSCRIPTION ENCOUNTER (OUTPATIENT)
Age: 77
End: 2017-11-10

## 2017-11-10 VITALS
HEART RATE: 53 BPM | SYSTOLIC BLOOD PRESSURE: 117 MMHG | TEMPERATURE: 98 F | RESPIRATION RATE: 18 BRPM | DIASTOLIC BLOOD PRESSURE: 68 MMHG | OXYGEN SATURATION: 97 %

## 2017-11-10 LAB
ANION GAP SERPL CALC-SCNC: 15 MMOL/L — SIGNIFICANT CHANGE UP (ref 5–17)
BUN SERPL-MCNC: 24 MG/DL — HIGH (ref 7–23)
CALCIUM SERPL-MCNC: 9.2 MG/DL — SIGNIFICANT CHANGE UP (ref 8.4–10.5)
CHLORIDE SERPL-SCNC: 100 MMOL/L — SIGNIFICANT CHANGE UP (ref 96–108)
CO2 SERPL-SCNC: 21 MMOL/L — LOW (ref 22–31)
CREAT SERPL-MCNC: 0.7 MG/DL — SIGNIFICANT CHANGE UP (ref 0.5–1.3)
GLUCOSE BLDC GLUCOMTR-MCNC: 107 MG/DL — HIGH (ref 70–99)
GLUCOSE BLDC GLUCOMTR-MCNC: 118 MG/DL — HIGH (ref 70–99)
GLUCOSE BLDC GLUCOMTR-MCNC: 139 MG/DL — HIGH (ref 70–99)
GLUCOSE SERPL-MCNC: 105 MG/DL — HIGH (ref 70–99)
HCT VFR BLD CALC: 42 % — SIGNIFICANT CHANGE UP (ref 34.5–45)
HGB BLD-MCNC: 14.1 G/DL — SIGNIFICANT CHANGE UP (ref 11.5–15.5)
MCHC RBC-ENTMCNC: 30.5 PG — SIGNIFICANT CHANGE UP (ref 27–34)
MCHC RBC-ENTMCNC: 33.6 GM/DL — SIGNIFICANT CHANGE UP (ref 32–36)
MCV RBC AUTO: 91 FL — SIGNIFICANT CHANGE UP (ref 80–100)
PLATELET # BLD AUTO: 180 K/UL — SIGNIFICANT CHANGE UP (ref 150–400)
POTASSIUM SERPL-MCNC: 3.9 MMOL/L — SIGNIFICANT CHANGE UP (ref 3.5–5.3)
POTASSIUM SERPL-SCNC: 3.9 MMOL/L — SIGNIFICANT CHANGE UP (ref 3.5–5.3)
RBC # BLD: 4.62 M/UL — SIGNIFICANT CHANGE UP (ref 3.8–5.2)
RBC # FLD: 12.9 % — SIGNIFICANT CHANGE UP (ref 10.3–14.5)
SODIUM SERPL-SCNC: 136 MMOL/L — SIGNIFICANT CHANGE UP (ref 135–145)
WBC # BLD: 9.5 K/UL — SIGNIFICANT CHANGE UP (ref 3.8–10.5)
WBC # FLD AUTO: 9.5 K/UL — SIGNIFICANT CHANGE UP (ref 3.8–10.5)

## 2017-11-10 PROCEDURE — 99232 SBSQ HOSP IP/OBS MODERATE 35: CPT | Mod: GC

## 2017-11-10 PROCEDURE — 93926 LOWER EXTREMITY STUDY: CPT | Mod: 26,RT

## 2017-11-10 PROCEDURE — 99233 SBSQ HOSP IP/OBS HIGH 50: CPT

## 2017-11-10 RX ORDER — METFORMIN HYDROCHLORIDE 850 MG/1
1 TABLET ORAL
Qty: 0 | Refills: 0 | COMMUNITY

## 2017-11-10 RX ORDER — OMEPRAZOLE 10 MG/1
1 CAPSULE, DELAYED RELEASE ORAL
Qty: 0 | Refills: 0 | DISCHARGE
Start: 2017-11-10

## 2017-11-10 RX ORDER — MIRABEGRON 50 MG/1
1 TABLET, EXTENDED RELEASE ORAL
Qty: 0 | Refills: 0 | COMMUNITY
Start: 2017-11-10

## 2017-11-10 RX ORDER — ROSUVASTATIN CALCIUM 5 MG/1
1 TABLET ORAL
Qty: 0 | Refills: 0 | COMMUNITY
Start: 2017-11-10

## 2017-11-10 RX ORDER — CLOPIDOGREL BISULFATE 75 MG/1
1 TABLET, FILM COATED ORAL
Qty: 0 | Refills: 0 | COMMUNITY
Start: 2017-11-10

## 2017-11-10 RX ORDER — ASPIRIN/CALCIUM CARB/MAGNESIUM 324 MG
1 TABLET ORAL
Qty: 0 | Refills: 0 | COMMUNITY
Start: 2017-11-10

## 2017-11-10 RX ORDER — ASPIRIN/CALCIUM CARB/MAGNESIUM 324 MG
1 TABLET ORAL
Qty: 0 | Refills: 0 | COMMUNITY

## 2017-11-10 RX ORDER — MIRABEGRON 50 MG/1
1 TABLET, EXTENDED RELEASE ORAL
Qty: 0 | Refills: 0 | COMMUNITY

## 2017-11-10 RX ORDER — AMLODIPINE BESYLATE 2.5 MG/1
1 TABLET ORAL
Qty: 0 | Refills: 0 | DISCHARGE
Start: 2017-11-10

## 2017-11-10 RX ADMIN — HEPARIN SODIUM 5000 UNIT(S): 5000 INJECTION INTRAVENOUS; SUBCUTANEOUS at 05:26

## 2017-11-10 RX ADMIN — MIRABEGRON 25 MILLIGRAM(S): 50 TABLET, EXTENDED RELEASE ORAL at 05:25

## 2017-11-10 RX ADMIN — ROSUVASTATIN CALCIUM 20 MILLIGRAM(S): 5 TABLET ORAL at 05:25

## 2017-11-10 RX ADMIN — IRBESARTAN AND HYDROCHLOROTHIAZIDE 1 TABLET(S): 12.5; 3 TABLET ORAL at 05:24

## 2017-11-10 RX ADMIN — CLOPIDOGREL BISULFATE 75 MILLIGRAM(S): 75 TABLET, FILM COATED ORAL at 13:44

## 2017-11-10 RX ADMIN — Medication 81 MILLIGRAM(S): at 13:43

## 2017-11-10 RX ADMIN — HEPARIN SODIUM 5000 UNIT(S): 5000 INJECTION INTRAVENOUS; SUBCUTANEOUS at 15:04

## 2017-11-10 RX ADMIN — AMLODIPINE BESYLATE 10 MILLIGRAM(S): 2.5 TABLET ORAL at 05:26

## 2017-11-10 RX ADMIN — OMEPRAZOLE 40 MILLIGRAM(S): 10 CAPSULE, DELAYED RELEASE ORAL at 05:26

## 2017-11-10 NOTE — DISCHARGE NOTE ADULT - PATIENT PORTAL LINK FT
“You can access the [employer] Patient Portal, offered by [facility], by registering with the following website: [email or web address]”

## 2017-11-10 NOTE — DISCHARGE NOTE ADULT - CARE PROVIDER_API CALL
Israel Viera), Cardiovascular Disease; Interventional Cardiology  [address] [place] [id number]  Phone: [phone]  Fax: [phone]

## 2017-11-10 NOTE — PROGRESS NOTE ADULT - PROBLEM SELECTOR PLAN 1
-pending u/s to evaluate for psuedoaneurysm, spoke with U/S, scheduled for 11 am today. Will need to f/u results   -hematoma and ecchymosis both appear stable today when compared to yesterday  -no neurovascular deficits in the LE b/l   -H&H remains stable  -no further bleeding or oozing when dressing was removed yesterday

## 2017-11-10 NOTE — PROGRESS NOTE ADULT - ASSESSMENT
77 F PMH CAD with stents (2007, restenosis 2013) on DAPT, HTN, HLD, T2DM, multiple back surgeries c/b collapsed lung remotely, CARLOS, evelin, urinary incontinence, dysphagia p/w 9 days of unstable angina, s/p cardiac cath with ALEJANDRA to RPL with post cath course complicated by right groin hematoma:
77 F PMH CAD with stents (2007, restenosis 2013) on DAPT, HTN, HLD, T2DM, multiple back surgeries c/b collapsed lung remotely, CARLOS, evelin, urinary incontinence, dysphagia p/w 9 days of unstable angina.
77 F PMH CAD with stents (2007, restenosis 2013) on DAPT, HTN, HLD, T2DM, multiple back surgeries c/b collapsed lung remotely, CARLOS, evelin, urinary incontinence, dysphagia p/w 9 days of unstable angina.
77F w/HTN, HLD, GERD, Chronic R. brachial a. occlusion, sinus bradycardia, pulmonary fibrosis.  Hx. of CAD s/p PCI mRCA and hx of instent restenosis of the R. postero lateral branch, managed with ALEJANDRA  Now p/w progressive chest pain and VILLA c/w unstable angina.     #Unstable angina; hx of CAD s/p prior PCI/ALEJANDRA.   - r/morales neg by cardiac enz.   - continue ASA, heparin gtt, Plavix   - For diagnostic cath today.     #HTN - continue usual med    #HLD - continue statin    ALVA Mena MD  Cardiology fellow  [id number]
77F w/HTN, HLD, GERD, Chronic R. brachial a. occlusion, sinus bradycardia, pulmonary fibrosis.  Hx. of CAD s/p PCI mRCA and hx of instent restenosis of the R. postero lateral branch, managed with ALEJANDRA  Now p/w progressive chest pain and VILLA c/w unstable angina; s/p LHC with ALEJANDRA RPL      #Unstable angina; s/p PCI/ALEJANDRA.   - continue ASA & Plavix   - Notable groin hematoma compressed manually, groin today is soft with notable ecchymoses slightly tender on exam. pulses palpable. - Await result of vascular study to r/o pseudoaneurysm.     #HTN - c/w home meds     #HLD - continue statin    ALVA Mena MD  Cardiology fellow  [phone]

## 2017-11-10 NOTE — PROGRESS NOTE ADULT - SUBJECTIVE AND OBJECTIVE BOX
Patient is a 77y old  Female who presents with a chief complaint of CP/SOB (07 Nov 2017 22:13)      SUBJECTIVE / OVERNIGHT EVENTS: Patient self removed dressing     MEDICATIONS  (STANDING):  amLODIPine   Tablet 10 milliGRAM(s) Oral daily  aspirin enteric coated 81 milliGRAM(s) Oral daily  clopidogrel Tablet 75 milliGRAM(s) Oral daily  dextrose 5%. 1000 milliLiter(s) (50 mL/Hr) IV Continuous <Continuous>  dextrose 50% Injectable 12.5 Gram(s) IV Push once  dextrose 50% Injectable 25 Gram(s) IV Push once  dextrose 50% Injectable 25 Gram(s) IV Push once  heparin  Injectable 5000 Unit(s) SubCutaneous every 8 hours  insulin lispro (HumaLOG) corrective regimen sliding scale   SubCutaneous three times a day before meals  insulin lispro (HumaLOG) corrective regimen sliding scale   SubCutaneous at bedtime  irbesartan 300 mG/hydrochlorothiazide 12.5 mG 1 Tablet(s) Oral daily  mirabegron ER 25 milliGRAM(s) Oral daily  omeprazole 40 milliGRAM(s) Oral daily  rosuvastatin 20 milliGRAM(s) Oral at bedtime    MEDICATIONS  (PRN):  acetaminophen   Tablet. 650 milliGRAM(s) Oral every 6 hours PRN Moderate Pain (4 - 6)  dextrose Gel 1 Dose(s) Oral once PRN Blood Glucose LESS THAN 70 milliGRAM(s)/deciliter  glucagon  Injectable 1 milliGRAM(s) IntraMuscular once PRN Glucose LESS THAN 70 milligrams/deciliter      Vital Signs Last 24 Hrs  T(C): 36.9 (10 Nov 2017 04:52), Max: 36.9 (10 Nov 2017 04:52)  T(F): 98.4 (10 Nov 2017 04:52), Max: 98.4 (10 Nov 2017 04:52)  HR: 53 (10 Nov 2017 04:52) (53 - 56)  BP: 133/71 (10 Nov 2017 04:52) (122/74 - 133/71)  BP(mean): --  RR: 18 (10 Nov 2017 04:52) (18 - 18)  SpO2: 96% (10 Nov 2017 04:52) (93% - 96%)  CAPILLARY BLOOD GLUCOSE      POCT Blood Glucose.: 107 mg/dL (10 Nov 2017 08:54)  POCT Blood Glucose.: 120 mg/dL (09 Nov 2017 21:54)  POCT Blood Glucose.: 120 mg/dL (09 Nov 2017 18:02)  POCT Blood Glucose.: 149 mg/dL (09 Nov 2017 13:08)    I&O's Summary    09 Nov 2017 07:01  -  10 Nov 2017 07:00  --------------------------------------------------------  IN: 660 mL / OUT: 0 mL / NET: 660 mL        PHYSICAL EXAM:  GENERAL: NAD, well-developed  HEAD:  Atraumatic, Normocephalic  EYES: EOMI, PERRLA, conjunctiva and sclera clear  NECK: Supple, No JVD  CHEST/LUNG: Clear to auscultation bilaterally; No wheeze  HEART: Regular rate and rhythm; No murmurs, rubs, or gallops  ABDOMEN: Soft, Nontender, Nondistended; Bowel sounds present  EXTREMITIES:  2+ Peripheral Pulses, No clubbing, cyanosis, or edema  PSYCH: AAOx3  NEUROLOGY: non-focal  SKIN: No rashes or lesions    LABS:                        14.1   9.5   )-----------( 180      ( 10 Nov 2017 06:46 )             42.0     11-10    136  |  100  |  24<H>  ----------------------------<  105<H>  3.9   |  21<L>  |  0.70    Ca    9.2      10 Nov 2017 06:46  Phos  3.9     11-09  Mg     2.0     11-09                RADIOLOGY & ADDITIONAL TESTS:    Imaging Personally Reviewed:    Consultant(s) Notes Reviewed:      Care Discussed with Consultants/Other Providers: Patient is a 77y old  Female who presents with a chief complaint of CP/SOB (07 Nov 2017 22:13)      SUBJECTIVE / OVERNIGHT EVENTS: Patient self removed dressing from right groin yesterday as she felt the tape was irritating her skin. She did not notice any further bleeding. She denies any weakness, numbness or tingling in her legs. She denies light headedness or dizziness.     MEDICATIONS  (STANDING):  amLODIPine   Tablet 10 milliGRAM(s) Oral daily  aspirin enteric coated 81 milliGRAM(s) Oral daily  clopidogrel Tablet 75 milliGRAM(s) Oral daily  dextrose 5%. 1000 milliLiter(s) (50 mL/Hr) IV Continuous <Continuous>  dextrose 50% Injectable 12.5 Gram(s) IV Push once  dextrose 50% Injectable 25 Gram(s) IV Push once  dextrose 50% Injectable 25 Gram(s) IV Push once  heparin  Injectable 5000 Unit(s) SubCutaneous every 8 hours  insulin lispro (HumaLOG) corrective regimen sliding scale   SubCutaneous three times a day before meals  insulin lispro (HumaLOG) corrective regimen sliding scale   SubCutaneous at bedtime  irbesartan 300 mG/hydrochlorothiazide 12.5 mG 1 Tablet(s) Oral daily  mirabegron ER 25 milliGRAM(s) Oral daily  omeprazole 40 milliGRAM(s) Oral daily  rosuvastatin 20 milliGRAM(s) Oral at bedtime    MEDICATIONS  (PRN):  acetaminophen   Tablet. 650 milliGRAM(s) Oral every 6 hours PRN Moderate Pain (4 - 6)  dextrose Gel 1 Dose(s) Oral once PRN Blood Glucose LESS THAN 70 milliGRAM(s)/deciliter  glucagon  Injectable 1 milliGRAM(s) IntraMuscular once PRN Glucose LESS THAN 70 milligrams/deciliter      Vital Signs Last 24 Hrs  T(C): 36.9 (10 Nov 2017 04:52), Max: 36.9 (10 Nov 2017 04:52)  T(F): 98.4 (10 Nov 2017 04:52), Max: 98.4 (10 Nov 2017 04:52)  HR: 53 (10 Nov 2017 04:52) (53 - 56)  BP: 133/71 (10 Nov 2017 04:52) (122/74 - 133/71)  BP(mean): --  RR: 18 (10 Nov 2017 04:52) (18 - 18)  SpO2: 96% (10 Nov 2017 04:52) (93% - 96%)  CAPILLARY BLOOD GLUCOSE      POCT Blood Glucose.: 107 mg/dL (10 Nov 2017 08:54)  POCT Blood Glucose.: 120 mg/dL (09 Nov 2017 21:54)  POCT Blood Glucose.: 120 mg/dL (09 Nov 2017 18:02)  POCT Blood Glucose.: 149 mg/dL (09 Nov 2017 13:08)    I&O's Summary    09 Nov 2017 07:01  -  10 Nov 2017 07:00  --------------------------------------------------------  IN: 660 mL / OUT: 0 mL / NET: 660 mL        PHYSICAL EXAM:  GENERAL: NAD, resting comfortably   HEAD:  Atraumatic, Normocephalic  EYES: EOMI, PERRLA, conjunctiva and sclera clear  NECK: Supple, No JVD  CHEST/LUNG: Clear to auscultation bilaterally; No wheeze  HEART: Regular rate and rhythm; +S1/S2  ABDOMEN: Soft, Nontender, Nondistended; Bowel sounds present  EXTREMITIES:  2+ Peripheral Pulses, No clubbing. R groin hematoma with extensive ecchymosis, although area of ecchymosis does not appear to be enlarging when compared to yesterday. +2 dorsalis pedis b/l. Strength is 5/5 in the LE b/l, sensation intact.   PSYCH: AAOx3  NEUROLOGY: non-focal      LABS:                        14.1   9.5   )-----------( 180      ( 10 Nov 2017 06:46 )             42.0     11-10    136  |  100  |  24<H>  ----------------------------<  105<H>  3.9   |  21<L>  |  0.70    Ca    9.2      10 Nov 2017 06:46  Phos  3.9     11-09  Mg     2.0     11-09        Consultant(s) Notes Reviewed:  Cardiology

## 2017-11-10 NOTE — PROVIDER CONTACT NOTE (OTHER) - ASSESSMENT
A&O x4.  +DP pulses.  Patient states dressing was supposed to be removed at 1600 today and states dressing is irritating her skin (states she will develop rash if dressing isn't removed), wants dressing to be removed or she will take it off herself, and states burning at site.

## 2017-11-10 NOTE — PROGRESS NOTE ADULT - SUBJECTIVE AND OBJECTIVE BOX
Date of Admission: 11/7/17    24H hour events:  No acute events overnight.     MEDICATIONS:  amLODIPine   Tablet 10 milliGRAM(s) Oral daily  aspirin enteric coated 81 milliGRAM(s) Oral daily  clopidogrel Tablet 75 milliGRAM(s) Oral daily  heparin  Injectable 5000 Unit(s) SubCutaneous every 8 hours  irbesartan 300 mG/hydrochlorothiazide 12.5 mG 1 Tablet(s) Oral daily        acetaminophen   Tablet. 650 milliGRAM(s) Oral every 6 hours PRN    omeprazole 40 milliGRAM(s) Oral daily    dextrose 50% Injectable 12.5 Gram(s) IV Push once  dextrose 50% Injectable 25 Gram(s) IV Push once  dextrose 50% Injectable 25 Gram(s) IV Push once  dextrose Gel 1 Dose(s) Oral once PRN  glucagon  Injectable 1 milliGRAM(s) IntraMuscular once PRN  insulin lispro (HumaLOG) corrective regimen sliding scale   SubCutaneous three times a day before meals  insulin lispro (HumaLOG) corrective regimen sliding scale   SubCutaneous at bedtime  rosuvastatin 20 milliGRAM(s) Oral at bedtime    dextrose 5%. 1000 milliLiter(s) IV Continuous <Continuous>  mirabegron ER 25 milliGRAM(s) Oral daily    REVIEW OF SYSTEMS:    CONSTITUTIONAL: No weakness, fevers or chills  EYES/ENT: No visual changes;  No dysphagia  NECK: No pain or stiffness  RESPIRATORY: No cough, wheezing, hemoptysis; No shortness of breath  CARDIOVASCULAR: No chest pain or palpitations; No lower extremity edema  GASTROINTESTINAL: No abdominal or epigastric pain. No nausea, vomiting, or hematemesis; No diarrhea or constipation. No melena or hematochezia.  BACK: No back pain  GENITOURINARY: No dysuria, frequency or hematuria  NEUROLOGICAL: No numbness or weakness  SKIN: No itching, burning, rashes, or lesions   All other review of systems is negative unless indicated above.    PHYSICAL EXAM:  T(C): 36.9 (11-10-17 @ 04:52), Max: 36.9 (11-10-17 @ 04:52)  HR: 53 (11-10-17 @ 04:52) (53 - 69)  BP: 133/71 (11-10-17 @ 04:52) (117/73 - 133/71)  RR: 18 (11-10-17 @ 04:52) (18 - 18)  SpO2: 96% (11-10-17 @ 04:52) (93% - 97%)  Wt(kg): --  I&O's Summary    09 Nov 2017 07:01  -  10 Nov 2017 07:00  --------------------------------------------------------  IN: 660 mL / OUT: 0 mL / NET: 660 mL    Appearance: Normal, pleasant, resting flat comfortably   HEENT:   Normal oral mucosa, PERRL, EOMI	  Lymphatic: No lymphadenopathy  Cardiovascular: Normal S1 S2, No JVD, No murmurs, No edema  Respiratory: Lungs clear to auscultation	  Psychiatry: A & O x 3, Mood & affect appropriate  Gastrointestinal:  Soft, Non-tender, + BS	  Skin: No rashes, No ecchymoses, No cyanosis	  Neurologic: Non-focal  Extremities: Normal range of motion, No clubbing, cyanosis or edema  Vascular: Peripheral pulses palpable 2+ bilaterally, R groin tender to palpation over ecchymoses post R. groin hematoma; Ecchymoses noted laterally and medially toward pubic region       LABS:	 	    CBC Full  -  ( 10 Nov 2017 06:46 )  WBC Count : 9.5 K/uL  Hemoglobin : 14.1 g/dL  Hematocrit : 42.0 %  Platelet Count - Automated : 180 K/uL  Mean Cell Volume : 91.0 fl  Mean Cell Hemoglobin : 30.5 pg  Mean Cell Hemoglobin Concentration : 33.6 gm/dL      11-10    136  |  100  |  24<H>  ----------------------------<  105<H>  3.9   |  21<L>  |  0.70  11-09    142  |  103  |  14  ----------------------------<  105<H>  3.9   |  24  |  0.63    Ca    9.2      10 Nov 2017 06:46  Ca    9.4      09 Nov 2017 06:54  Phos  3.9     11-09  Phos  <0.3     11-09  Mg     2.0     11-09        proBNP: Serum Pro-Brain Natriuretic Peptide: 42 pg/mL (11-07 @ 15:12)    TELEMETRY: 	  SR 50-90s    	  ASSESSMENT/PLAN: Date of Admission: 11/7/17    24H hour events:  No acute events overnight. Reports ongoing tenderness at R. femoral groin site. Otherwise no complaints of CP, SOB, palpitations, lightheadedness or n/v     MEDICATIONS:  amLODIPine   Tablet 10 milliGRAM(s) Oral daily  aspirin enteric coated 81 milliGRAM(s) Oral daily  clopidogrel Tablet 75 milliGRAM(s) Oral daily  heparin  Injectable 5000 Unit(s) SubCutaneous every 8 hours  irbesartan 300 mG/hydrochlorothiazide 12.5 mG 1 Tablet(s) Oral daily    acetaminophen   Tablet. 650 milliGRAM(s) Oral every 6 hours PRN    omeprazole 40 milliGRAM(s) Oral daily    dextrose 50% Injectable 12.5 Gram(s) IV Push once  dextrose 50% Injectable 25 Gram(s) IV Push once  dextrose 50% Injectable 25 Gram(s) IV Push once  dextrose Gel 1 Dose(s) Oral once PRN  glucagon  Injectable 1 milliGRAM(s) IntraMuscular once PRN  insulin lispro (HumaLOG) corrective regimen sliding scale   SubCutaneous three times a day before meals  insulin lispro (HumaLOG) corrective regimen sliding scale   SubCutaneous at bedtime  rosuvastatin 20 milliGRAM(s) Oral at bedtime    dextrose 5%. 1000 milliLiter(s) IV Continuous <Continuous>  mirabegron ER 25 milliGRAM(s) Oral daily    REVIEW OF SYSTEMS:    CONSTITUTIONAL: No weakness, fevers or chills  EYES/ENT: No visual changes;  No dysphagia  NECK: No pain or stiffness  RESPIRATORY: No cough, wheezing, hemoptysis; No shortness of breath  CARDIOVASCULAR: No chest pain or palpitations; No lower extremity edema  GASTROINTESTINAL: No abdominal or epigastric pain. No nausea, vomiting, or hematemesis; No diarrhea or constipation. No melena or hematochezia.  BACK: No back pain  GENITOURINARY: No dysuria, frequency or hematuria  NEUROLOGICAL: No numbness or weakness  SKIN: No itching, burning, rashes, or lesions   All other review of systems is negative unless indicated above.    PHYSICAL EXAM:  T(C): 36.9 (11-10-17 @ 04:52), Max: 36.9 (11-10-17 @ 04:52)  HR: 53 (11-10-17 @ 04:52) (53 - 69)  BP: 133/71 (11-10-17 @ 04:52) (117/73 - 133/71)  RR: 18 (11-10-17 @ 04:52) (18 - 18)  SpO2: 96% (11-10-17 @ 04:52) (93% - 97%)  Wt(kg): --  I&O's Summary    09 Nov 2017 07:01  -  10 Nov 2017 07:00  --------------------------------------------------------  IN: 660 mL / OUT: 0 mL / NET: 660 mL    Appearance: Normal, pleasant, sitting up comfortably   HEENT:   Normal oral mucosa, PERRL, EOMI	  Lymphatic: No lymphadenopathy  Cardiovascular: Normal S1 S2, No JVD, No murmurs, No edema  Respiratory: Lungs clear to auscultation	  Psychiatry: A & O x 3, Mood & affect appropriate  Gastrointestinal:  Soft, Non-tender, + BS	  Skin: No rashes, No ecchymoses, No cyanosis	  Neurologic: Non-focal  Extremities: Normal range of motion, No clubbing, cyanosis or edema  Vascular: Peripheral pulses palpable 2+ bilaterally, R groin tender to palpation over ecchymoses post R. groin hematoma; Ecchymoses noted laterally and medially toward pubic region       LABS:	 	    CBC Full  -  ( 10 Nov 2017 06:46 )  WBC Count : 9.5 K/uL  Hemoglobin : 14.1 g/dL  Hematocrit : 42.0 %  Platelet Count - Automated : 180 K/uL  Mean Cell Volume : 91.0 fl  Mean Cell Hemoglobin : 30.5 pg  Mean Cell Hemoglobin Concentration : 33.6 gm/dL      11-10    136  |  100  |  24<H>  ----------------------------<  105<H>  3.9   |  21<L>  |  0.70  11-09    142  |  103  |  14  ----------------------------<  105<H>  3.9   |  24  |  0.63    Ca    9.2      10 Nov 2017 06:46  Ca    9.4      09 Nov 2017 06:54  Phos  3.9     11-09  Phos  <0.3     11-09  Mg     2.0     11-09        proBNP: Serum Pro-Brain Natriuretic Peptide: 42 pg/mL (11-07 @ 15:12)    TELEMETRY: 	  SR 50-90s    	  ASSESSMENT/PLAN: 	    77F w/HTN, HLD, GERD, Chronic R. brachial a. occlusion, sinus bradycardia, pulmonary fibrosis.  Hx. of CAD s/p PCI mRCA and hx of instent restenosis of the R. postero lateral branch, managed with ALEJANDRA  Now p/w progressive chest pain and VILLA c/w unstable angina; s/p LHC with ALEJANDRA RPL    #Unstable angina; s/p PCI/ALEJANDRA.   - continue ASA & Plavix   - Notable groin hematoma compressed manually, groin today is soft with notable ecchymoses slightly tender on exam. pulses palpable. - Pending vascular study to r/o pseudoaneurysm.     #HTN - c/w home meds     #HLD - continue statin    ALVA Mena MD  Cardiology fellow  [id number] Date of Admission: 11/7/17    24H hour events:  No acute events overnight. Reports ongoing tenderness at R. femoral groin site. Otherwise no complaints of CP, SOB, palpitations, lightheadedness or n/v     MEDICATIONS:  amLODIPine   Tablet 10 milliGRAM(s) Oral daily  aspirin enteric coated 81 milliGRAM(s) Oral daily  clopidogrel Tablet 75 milliGRAM(s) Oral daily  heparin  Injectable 5000 Unit(s) SubCutaneous every 8 hours  irbesartan 300 mG/hydrochlorothiazide 12.5 mG 1 Tablet(s) Oral daily  acetaminophen   Tablet. 650 milliGRAM(s) Oral every 6 hours PRN  omeprazole 40 milliGRAM(s) Oral daily  dextrose 50% Injectable 12.5 Gram(s) IV Push once  dextrose 50% Injectable 25 Gram(s) IV Push once  dextrose 50% Injectable 25 Gram(s) IV Push once  dextrose Gel 1 Dose(s) Oral once PRN  glucagon  Injectable 1 milliGRAM(s) IntraMuscular once PRN  insulin lispro (HumaLOG) corrective regimen sliding scale   SubCutaneous three times a day before meals  insulin lispro (HumaLOG) corrective regimen sliding scale   SubCutaneous at bedtime  rosuvastatin 20 milliGRAM(s) Oral at bedtime  dextrose 5%. 1000 milliLiter(s) IV Continuous <Continuous>  mirabegron ER 25 milliGRAM(s) Oral daily    REVIEW OF SYSTEMS:  CONSTITUTIONAL: No weakness, fevers or chills  EYES/ENT: No visual changes;  No dysphagia  NECK: No pain or stiffness  RESPIRATORY: No cough, wheezing, hemoptysis; No shortness of breath  CARDIOVASCULAR: No chest pain or palpitations; No lower extremity edema  GASTROINTESTINAL: No abdominal or epigastric pain. No nausea, vomiting, or hematemesis; No diarrhea or constipation. No melena or hematochezia.  BACK: No back pain  GENITOURINARY: No dysuria, frequency or hematuria  NEUROLOGICAL: No numbness or weakness  SKIN: No itching, burning, rashes, or lesions   All other review of systems is negative unless indicated above.    PHYSICAL EXAM:  T(C): 36.9 (11-10-17 @ 04:52), Max: 36.9 (11-10-17 @ 04:52)  HR: 53 (11-10-17 @ 04:52) (53 - 69)  BP: 133/71 (11-10-17 @ 04:52) (117/73 - 133/71)  RR: 18 (11-10-17 @ 04:52) (18 - 18)  SpO2: 96% (11-10-17 @ 04:52) (93% - 97%)    Appearance: Normal, pleasant, sitting up comfortably   HEENT:   Normal oral mucosa, PERRL, EOMI	  Lymphatic: No lymphadenopathy  Cardiovascular: Normal S1 S2, No JVD, No murmurs, No edema  Respiratory: Lungs clear to auscultation	  Psychiatry: A & O x 3, Mood & affect appropriate  Gastrointestinal:  Soft, Non-tender, + BS	  Skin: No rashes, No ecchymoses, No cyanosis	  Neurologic: Non-focal  Extremities: Normal range of motion, No clubbing, cyanosis or edema  Vascular: Peripheral pulses palpable 2+ bilaterally, R groin tender to palpation over ecchymoses post R. groin hematoma; Ecchymoses noted laterally and medially toward pubic region     TELEMETRY: 	  SR 50-90s, occasional VPCs    LABS:	 	  CBC Full  -  ( 10 Nov 2017 06:46 )  WBC Count : 9.5 K/uL  Hemoglobin : 14.1 g/dL  Hematocrit : 42.0 %  Platelet Count - Automated : 180 K/uL  Mean Cell Volume : 91.0 fl  Mean Cell Hemoglobin : 30.5 pg  Mean Cell Hemoglobin Concentration : 33.6 gm/dL      11-10  136  |  100  |  24<H>  ----------------------------<  105<H>  3.9   |  21<L>  |  0.70    11-09  142  |  103  |  14  ----------------------------<  105<H>  3.9   |  24  |  0.63    Ca    9.2      10 Nov 2017 06:46  Ca    9.4      09 Nov 2017 06:54    Phos  3.9     11-09    Mg     2.0     11-09

## 2017-11-10 NOTE — PROVIDER CONTACT NOTE (OTHER) - BACKGROUND
Patient admitted for CP and VILLA.  PMH of MI, hypercholesteremia, HTN, CAD.  Patient had cath 11-8-17 with 1 stent to ALEJANDRA.  Patient developed hematoma/ecchymosis at site afterwards

## 2017-11-10 NOTE — DISCHARGE NOTE ADULT - ADDITIONAL INSTRUCTIONS
followup with cardiologist within one week followup with cardiologist/Dr Bass  within one week for Hematoma re-evaluation

## 2017-11-10 NOTE — DISCHARGE NOTE ADULT - HOSPITAL COURSE
77F w/HTN, HLD, GERD, Chronic R. brachial a. occlusion, sinus bradycardia, pulmonary fibrosis.  Hx. of CAD s/p PCI mRCA and hx of instent restenosis of the R. postero lateral branch, managed with ALEJANDRA  Now p/w progressive chest pain and VILLA c/w unstable angina; s/p LHC with ALEJANDRA RPL  Hematoma right groin compressed in cath lab 11/8/17. Right groin U/S negative for Pseudoaneurysm.  Cleared for d/c today by cardiologist & Medicine. 77F with history of CAD s/p PCI mRCA and hx of instent restenosis , HLD, GERD, Chronic R. brachial a. occlusion, sinus bradycardia, pulmonary fibrosis admitted with progressive chest pain and dyspnea on exertion. The patient underwent LHC with drug eluting stent placed to RPL. The post cath course was complicated by a right groin hematoma, which was stable on exam. The patient had a right groin ultrasound that was done, which was negative for pseudoaneursym at the site of the hematoma.     The patient was discharged on medical therapy, and will be following up with PCP and cardiology.

## 2017-11-10 NOTE — PROGRESS NOTE ADULT - NSHPATTENDINGPLANDISCUSS_GEN_ALL_CORE
Patient seen and examined; discussed with cardiology fellow. Hx., exam and labs as above.  I agree with the assessment and recommendations.                                         Obie Dye M.D.  Cardiology Consult Service  [phone] or [phone]
Patient seen and examined; discussed with cardiology fellow. Hx., exam and labs as above.  I agree with the assessment and recommendations.                                         Obie Dye M.D.  Cardiology Consult Service  [phone] or [phone]
Patient seen and examined; discussed with cardiology fellow. Hx., exam and labs as above.  I agree with the assessment and recommendations.                                         Obie Dye M.D.  Cardiology Consult Service  [phone] or [phone]
patient, cardiology fellow.

## 2017-11-10 NOTE — DISCHARGE NOTE ADULT - PLAN OF CARE
s/p PCI/ALEJANDRA via right femoral artery 11/8/17 improving right groin ultrasound negative for Pseudoaneurysm 11/10/17 followup cardiologist within one week right groin ultrasound negative for Pseudoaneurysm 11/10/17  No heavy lifting, driving, sex, tub baths, swimming, or any activity that submerges the lower half of the body in water for 48 hours.  Limited walking and stairs for 48 hours.    Change the bandaid after 24 hours and every 24 hours after that.  Keep the puncture site dry and covered with a bandaid until a scab forms.    Observe the site frequently.  If bleeding or a large lump (the size of a golf ball or bigger) occurs lie flat, apply continuous direct pressure just above the puncture site for at least 10 minutes, and notify your physician immediately.  If the bleeding cannot be controlled, call 911 immediately for assistance.  Notify your physician of pain, swelling or any drainage.    Notify your physician immediately if coldness, numbness, discoloration or pain in your foot occurs. stable may restart  metformin @ home dosage 11/11/17 improved after PCI/Stent; stable

## 2017-11-10 NOTE — PROVIDER CONTACT NOTE (OTHER) - ACTION/TREATMENT ORDERED:
NP notified & aware, states she will not remove dressing. Reinforced to patient dressing is to prevent more complications. Pt states understanding. Dressing was removed by patient. Continue to monitor

## 2017-11-10 NOTE — DISCHARGE NOTE ADULT - CARE PLAN
Principal Discharge DX:	Unstable angina  Goal:	s/p PCI/ALEJANDRA via right femoral artery 11/8/17  Secondary Diagnosis:	Hematoma of groin, initial encounter  Goal:	improving  Instructions for follow-up, activity and diet:	right groin ultrasound negative for Pseudoaneurysm 11/10/17  Secondary Diagnosis:	Type 2 diabetes mellitus without complication, without long-term current use of insulin  Secondary Diagnosis:	CAD (coronary artery disease) Principal Discharge DX:	Unstable angina  Goal:	s/p PCI/ALEJANDRA via right femoral artery 11/8/17  Instructions for follow-up, activity and diet:	followup cardiologist within one week  Secondary Diagnosis:	Hematoma of groin, initial encounter  Goal:	improving  Instructions for follow-up, activity and diet:	right groin ultrasound negative for Pseudoaneurysm 11/10/17  No heavy lifting, driving, sex, tub baths, swimming, or any activity that submerges the lower half of the body in water for 48 hours.  Limited walking and stairs for 48 hours.    Change the bandaid after 24 hours and every 24 hours after that.  Keep the puncture site dry and covered with a bandaid until a scab forms.    Observe the site frequently.  If bleeding or a large lump (the size of a golf ball or bigger) occurs lie flat, apply continuous direct pressure just above the puncture site for at least 10 minutes, and notify your physician immediately.  If the bleeding cannot be controlled, call 911 immediately for assistance.  Notify your physician of pain, swelling or any drainage.    Notify your physician immediately if coldness, numbness, discoloration or pain in your foot occurs.  Secondary Diagnosis:	Type 2 diabetes mellitus without complication, without long-term current use of insulin  Goal:	stable  Instructions for follow-up, activity and diet:	may restart  metformin @ home dosage 11/11/17  Secondary Diagnosis:	CAD (coronary artery disease)  Goal:	improved after PCI/Stent; stable

## 2017-11-10 NOTE — DISCHARGE NOTE ADULT - MEDICATION SUMMARY - MEDICATIONS TO TAKE
I will START or STAY ON the medications listed below when I get home from the hospital:    aspirin 81 mg oral delayed release tablet  -- 1 tab(s) by mouth once a day  -- Indication: For CAD (coronary artery disease)    metFORMIN 500 mg oral tablet  -- 1 tab(s) by mouth once a day  may restart 11/11/17 am  -- Indication: For Type 2 diabetes mellitus without complication, without long-term current use of insulin    rosuvastatin 20 mg oral tablet  -- 1 tab(s) by mouth once a day (at bedtime)  -- Indication: For CAD (coronary artery disease)    irbesartan-hydrochlorothiazide 300mg-12.5mg oral tablet  -- 1 tab(s) by mouth once a day  -- Indication: For CAD (coronary artery disease)    clopidogrel 75 mg oral tablet  -- 1 tab(s) by mouth once a day  -- Indication: For CAD (coronary artery disease)    amLODIPine 10 mg oral tablet  -- 1 tab(s) by mouth once a day  -- Indication: For Essential hypertension    omeprazole 40 mg oral delayed release capsule  -- 1 cap(s) by mouth once a day  -- Indication: For Prophylactic measure    mirabegron 25 mg oral tablet, extended release  -- 1 tab(s) by mouth once a day  -- Indication: For Prophylactic measure

## 2017-11-10 NOTE — PROVIDER CONTACT NOTE (OTHER) - SITUATION
Patient c/o burning & discomfort from right groin cath site occlusive dressing & wants dressing to be removed

## 2017-11-10 NOTE — DISCHARGE NOTE ADULT - SECONDARY DIAGNOSIS.
Hematoma of groin, initial encounter Type 2 diabetes mellitus without complication, without long-term current use of insulin CAD (coronary artery disease)

## 2017-11-17 ENCOUNTER — APPOINTMENT (OUTPATIENT)
Dept: CARDIOLOGY | Facility: CLINIC | Age: 77
End: 2017-11-17
Payer: MEDICARE

## 2017-11-17 ENCOUNTER — NON-APPOINTMENT (OUTPATIENT)
Age: 77
End: 2017-11-17

## 2017-11-17 VITALS
WEIGHT: 138 LBS | OXYGEN SATURATION: 99 % | HEART RATE: 63 BPM | SYSTOLIC BLOOD PRESSURE: 138 MMHG | BODY MASS INDEX: 23.69 KG/M2 | DIASTOLIC BLOOD PRESSURE: 60 MMHG

## 2017-11-17 DIAGNOSIS — I20.9 ANGINA PECTORIS, UNSPECIFIED: ICD-10-CM

## 2017-11-17 PROCEDURE — 93000 ELECTROCARDIOGRAM COMPLETE: CPT

## 2017-11-17 PROCEDURE — 99215 OFFICE O/P EST HI 40 MIN: CPT

## 2017-11-17 RX ORDER — SUMATRIPTAN 20 MG/1
20 SPRAY NASAL
Qty: 6 | Refills: 0 | Status: COMPLETED | COMMUNITY
Start: 2017-11-06

## 2017-11-17 RX ORDER — SUMATRIPTAN 100 MG/1
100 TABLET, FILM COATED ORAL
Qty: 9 | Refills: 0 | Status: DISCONTINUED | COMMUNITY
Start: 2017-09-28 | End: 2017-11-17

## 2017-12-19 PROCEDURE — 99153 MOD SED SAME PHYS/QHP EA: CPT

## 2017-12-19 PROCEDURE — C9600: CPT | Mod: RC

## 2017-12-19 PROCEDURE — C1725: CPT

## 2017-12-19 PROCEDURE — 84484 ASSAY OF TROPONIN QUANT: CPT

## 2017-12-19 PROCEDURE — 82435 ASSAY OF BLOOD CHLORIDE: CPT

## 2017-12-19 PROCEDURE — C1887: CPT

## 2017-12-19 PROCEDURE — 82947 ASSAY GLUCOSE BLOOD QUANT: CPT

## 2017-12-19 PROCEDURE — C1874: CPT

## 2017-12-19 PROCEDURE — 83605 ASSAY OF LACTIC ACID: CPT

## 2017-12-19 PROCEDURE — 83735 ASSAY OF MAGNESIUM: CPT

## 2017-12-19 PROCEDURE — 93926 LOWER EXTREMITY STUDY: CPT

## 2017-12-19 PROCEDURE — 85730 THROMBOPLASTIN TIME PARTIAL: CPT

## 2017-12-19 PROCEDURE — 80048 BASIC METABOLIC PNL TOTAL CA: CPT

## 2017-12-19 PROCEDURE — 82803 BLOOD GASES ANY COMBINATION: CPT

## 2017-12-19 PROCEDURE — 99285 EMERGENCY DEPT VISIT HI MDM: CPT | Mod: 25

## 2017-12-19 PROCEDURE — 85014 HEMATOCRIT: CPT

## 2017-12-19 PROCEDURE — 84132 ASSAY OF SERUM POTASSIUM: CPT

## 2017-12-19 PROCEDURE — 90686 IIV4 VACC NO PRSV 0.5 ML IM: CPT

## 2017-12-19 PROCEDURE — 82550 ASSAY OF CK (CPK): CPT

## 2017-12-19 PROCEDURE — 96374 THER/PROPH/DIAG INJ IV PUSH: CPT

## 2017-12-19 PROCEDURE — 84100 ASSAY OF PHOSPHORUS: CPT

## 2017-12-19 PROCEDURE — C1894: CPT

## 2017-12-19 PROCEDURE — 83036 HEMOGLOBIN GLYCOSYLATED A1C: CPT

## 2017-12-19 PROCEDURE — 93458 L HRT ARTERY/VENTRICLE ANGIO: CPT | Mod: 59

## 2017-12-19 PROCEDURE — 82330 ASSAY OF CALCIUM: CPT

## 2017-12-19 PROCEDURE — 84443 ASSAY THYROID STIM HORMONE: CPT

## 2017-12-19 PROCEDURE — 84295 ASSAY OF SERUM SODIUM: CPT

## 2017-12-19 PROCEDURE — 99152 MOD SED SAME PHYS/QHP 5/>YRS: CPT

## 2017-12-19 PROCEDURE — 93005 ELECTROCARDIOGRAM TRACING: CPT

## 2017-12-19 PROCEDURE — 82553 CREATINE MB FRACTION: CPT

## 2017-12-19 PROCEDURE — 85027 COMPLETE CBC AUTOMATED: CPT

## 2017-12-19 PROCEDURE — 80061 LIPID PANEL: CPT

## 2017-12-19 PROCEDURE — 80053 COMPREHEN METABOLIC PANEL: CPT

## 2017-12-19 PROCEDURE — 82962 GLUCOSE BLOOD TEST: CPT

## 2017-12-19 PROCEDURE — C1769: CPT

## 2017-12-19 PROCEDURE — 83880 ASSAY OF NATRIURETIC PEPTIDE: CPT

## 2017-12-19 PROCEDURE — 71046 X-RAY EXAM CHEST 2 VIEWS: CPT

## 2018-04-23 ENCOUNTER — NON-APPOINTMENT (OUTPATIENT)
Age: 78
End: 2018-04-23

## 2018-04-23 ENCOUNTER — APPOINTMENT (OUTPATIENT)
Dept: CARDIOLOGY | Facility: CLINIC | Age: 78
End: 2018-04-23
Payer: MEDICARE

## 2018-04-23 VITALS
SYSTOLIC BLOOD PRESSURE: 138 MMHG | HEART RATE: 66 BPM | DIASTOLIC BLOOD PRESSURE: 70 MMHG | HEIGHT: 64 IN | WEIGHT: 144 LBS | BODY MASS INDEX: 24.59 KG/M2

## 2018-04-23 PROCEDURE — 93000 ELECTROCARDIOGRAM COMPLETE: CPT

## 2018-04-23 PROCEDURE — 99215 OFFICE O/P EST HI 40 MIN: CPT

## 2018-04-23 RX ORDER — CIPROFLOXACIN HYDROCHLORIDE 250 MG/1
250 TABLET, FILM COATED ORAL
Qty: 90 | Refills: 0 | Status: COMPLETED | COMMUNITY
Start: 2018-03-26

## 2018-04-23 RX ORDER — TIZANIDINE 4 MG/1
4 TABLET ORAL
Qty: 60 | Refills: 0 | Status: DISCONTINUED | COMMUNITY
Start: 2017-08-31 | End: 2018-04-23

## 2018-04-23 RX ORDER — MIRABEGRON 50 MG/1
50 TABLET, FILM COATED, EXTENDED RELEASE ORAL
Qty: 90 | Refills: 0 | Status: DISCONTINUED | COMMUNITY
Start: 2018-01-22 | End: 2018-04-23

## 2018-04-23 RX ORDER — MIRABEGRON 25 MG/1
25 TABLET, FILM COATED, EXTENDED RELEASE ORAL
Qty: 90 | Refills: 0 | Status: DISCONTINUED | COMMUNITY
Start: 2017-08-28 | End: 2018-04-23

## 2018-04-23 RX ORDER — FOSFOMYCIN TROMETHAMINE 3 G/1
3 POWDER ORAL
Qty: 1 | Refills: 0 | Status: DISCONTINUED | COMMUNITY
Start: 2017-10-26 | End: 2018-04-23

## 2018-04-23 RX ORDER — GABAPENTIN 300 MG/1
300 CAPSULE ORAL
Qty: 30 | Refills: 0 | Status: DISCONTINUED | COMMUNITY
Start: 2017-08-31 | End: 2018-04-23

## 2018-04-23 RX ORDER — AZITHROMYCIN 250 MG/1
250 TABLET, FILM COATED ORAL
Qty: 6 | Refills: 0 | Status: COMPLETED | COMMUNITY
Start: 2018-01-19

## 2018-06-11 ENCOUNTER — APPOINTMENT (OUTPATIENT)
Age: 78
End: 2018-06-11
Payer: MEDICARE

## 2018-06-11 ENCOUNTER — INPATIENT (INPATIENT)
Facility: HOSPITAL | Age: 78
LOS: 4 days | Discharge: HOME | DRG: 39 | End: 2018-06-16
Attending: NEUROLOGICAL SURGERY | Admitting: HOSPITALIST
Payer: MEDICARE

## 2018-06-11 VITALS
SYSTOLIC BLOOD PRESSURE: 119 MMHG | DIASTOLIC BLOOD PRESSURE: 64 MMHG | HEART RATE: 73 BPM | OXYGEN SATURATION: 97 % | RESPIRATION RATE: 18 BRPM | TEMPERATURE: 98 F

## 2018-06-11 VITALS
HEART RATE: 78 BPM | SYSTOLIC BLOOD PRESSURE: 132 MMHG | HEIGHT: 64 IN | DIASTOLIC BLOOD PRESSURE: 68 MMHG | BODY MASS INDEX: 25.1 KG/M2 | WEIGHT: 147 LBS

## 2018-06-11 VITALS — SYSTOLIC BLOOD PRESSURE: 124 MMHG | DIASTOLIC BLOOD PRESSURE: 68 MMHG

## 2018-06-11 DIAGNOSIS — R51 HEADACHE: ICD-10-CM

## 2018-06-11 DIAGNOSIS — I70.8 ATHEROSCLEROSIS OF OTHER ARTERIES: ICD-10-CM

## 2018-06-11 LAB
ALBUMIN SERPL ELPH-MCNC: 4.3 G/DL — SIGNIFICANT CHANGE UP (ref 3.3–5)
ALP SERPL-CCNC: 103 U/L — SIGNIFICANT CHANGE UP (ref 40–120)
ALT FLD-CCNC: 17 U/L — SIGNIFICANT CHANGE UP (ref 10–45)
ANION GAP SERPL CALC-SCNC: 14 MMOL/L — SIGNIFICANT CHANGE UP (ref 5–17)
APTT BLD: 32.5 SEC — SIGNIFICANT CHANGE UP (ref 27.5–37.4)
AST SERPL-CCNC: 20 U/L — SIGNIFICANT CHANGE UP (ref 10–40)
BASOPHILS # BLD AUTO: 0 K/UL — SIGNIFICANT CHANGE UP (ref 0–0.2)
BASOPHILS NFR BLD AUTO: 0.2 % — SIGNIFICANT CHANGE UP (ref 0–2)
BILIRUB SERPL-MCNC: 0.5 MG/DL — SIGNIFICANT CHANGE UP (ref 0.2–1.2)
BUN SERPL-MCNC: 19 MG/DL — SIGNIFICANT CHANGE UP (ref 7–23)
CALCIUM SERPL-MCNC: 9.8 MG/DL — SIGNIFICANT CHANGE UP (ref 8.4–10.5)
CHLORIDE SERPL-SCNC: 102 MMOL/L — SIGNIFICANT CHANGE UP (ref 96–108)
CO2 SERPL-SCNC: 25 MMOL/L — SIGNIFICANT CHANGE UP (ref 22–31)
CREAT SERPL-MCNC: 0.8 MG/DL — SIGNIFICANT CHANGE UP (ref 0.5–1.3)
EOSINOPHIL # BLD AUTO: 0.1 K/UL — SIGNIFICANT CHANGE UP (ref 0–0.5)
EOSINOPHIL NFR BLD AUTO: 1 % — SIGNIFICANT CHANGE UP (ref 0–6)
GLUCOSE SERPL-MCNC: 114 MG/DL — HIGH (ref 70–99)
HCT VFR BLD CALC: 45.3 % — HIGH (ref 34.5–45)
HGB BLD-MCNC: 15 G/DL — SIGNIFICANT CHANGE UP (ref 11.5–15.5)
INR BLD: 0.98 RATIO — SIGNIFICANT CHANGE UP (ref 0.88–1.16)
LYMPHOCYTES # BLD AUTO: 2.4 K/UL — SIGNIFICANT CHANGE UP (ref 1–3.3)
LYMPHOCYTES # BLD AUTO: 34.3 % — SIGNIFICANT CHANGE UP (ref 13–44)
MCHC RBC-ENTMCNC: 30.3 PG — SIGNIFICANT CHANGE UP (ref 27–34)
MCHC RBC-ENTMCNC: 33.2 GM/DL — SIGNIFICANT CHANGE UP (ref 32–36)
MCV RBC AUTO: 91.5 FL — SIGNIFICANT CHANGE UP (ref 80–100)
MONOCYTES # BLD AUTO: 0.7 K/UL — SIGNIFICANT CHANGE UP (ref 0–0.9)
MONOCYTES NFR BLD AUTO: 9.9 % — SIGNIFICANT CHANGE UP (ref 2–14)
NEUTROPHILS # BLD AUTO: 3.8 K/UL — SIGNIFICANT CHANGE UP (ref 1.8–7.4)
NEUTROPHILS NFR BLD AUTO: 54.7 % — SIGNIFICANT CHANGE UP (ref 43–77)
PLATELET # BLD AUTO: 173 K/UL — SIGNIFICANT CHANGE UP (ref 150–400)
POTASSIUM SERPL-MCNC: 4.1 MMOL/L — SIGNIFICANT CHANGE UP (ref 3.5–5.3)
POTASSIUM SERPL-SCNC: 4.1 MMOL/L — SIGNIFICANT CHANGE UP (ref 3.5–5.3)
PROT SERPL-MCNC: 7.4 G/DL — SIGNIFICANT CHANGE UP (ref 6–8.3)
PROTHROM AB SERPL-ACNC: 10.7 SEC — SIGNIFICANT CHANGE UP (ref 9.8–12.7)
RBC # BLD: 4.95 M/UL — SIGNIFICANT CHANGE UP (ref 3.8–5.2)
RBC # FLD: 12.3 % — SIGNIFICANT CHANGE UP (ref 10.3–14.5)
SODIUM SERPL-SCNC: 141 MMOL/L — SIGNIFICANT CHANGE UP (ref 135–145)
WBC # BLD: 7 K/UL — SIGNIFICANT CHANGE UP (ref 3.8–10.5)
WBC # FLD AUTO: 7 K/UL — SIGNIFICANT CHANGE UP (ref 3.8–10.5)

## 2018-06-11 PROCEDURE — 70498 CT ANGIOGRAPHY NECK: CPT | Mod: 26

## 2018-06-11 PROCEDURE — 99285 EMERGENCY DEPT VISIT HI MDM: CPT | Mod: 25

## 2018-06-11 PROCEDURE — 70470 CT HEAD/BRAIN W/O & W/DYE: CPT | Mod: 26

## 2018-06-11 PROCEDURE — 93010 ELECTROCARDIOGRAM REPORT: CPT

## 2018-06-11 PROCEDURE — 99215 OFFICE O/P EST HI 40 MIN: CPT | Mod: PD

## 2018-06-11 NOTE — ED ADULT NURSE NOTE - CHPI ED SYMPTOMS NEG
no confusion/no weakness/no numbness/no change in level of consciousness/no blurred vision/no loss of consciousness/no vomiting/no fever

## 2018-06-11 NOTE — ED ADULT TRIAGE NOTE - CHIEF COMPLAINT QUOTE
Sent by Dr. Brown & Dr. Jeffries for admission. Has episodes where she "feel like she was whacked in the head and can't see for a few seconds". Last episode on May 30th. Currently no pain. Sent by Dr. Brown & Dr. Jeffries for admission. Has episodes where she "feel like she was whacked in the head and can't see for a few seconds". Last episode on May 30th. History of falls with these episodes.

## 2018-06-11 NOTE — ED PROVIDER NOTE - ATTENDING CONTRIBUTION TO CARE
Attending MD Osorio:   I personally have seen and examined this patient.  Physician assistant note reviewed and agree on plan of care and except where noted.  See below for details.     77F with PMH/PSH including HTN, HLD, DM, subclavian stenosis, occipital neuralgia (last injection May), back surgery, heart surgery presents to the ED sent in from PMD for headache.  Reports that feels like she is being hit in the back of the head with a shovel.  Reports that she has loss of vision for 15 seconds without loss of consciousness.  Reports had episode last week so severe that she fell (5/30/18).  Reports that the pain that she has been having intermittently over the last few weeks is different than her typical headache pain.  Reports that she saw a neurologist Dr. Ortiz who sent her for MRI/MRA which showed chronic microvascular ischemia and disc herniation with nerve root compression.  MRA motion degraded.  Reports that the neurologist did not attribute her symptoms and falls to this so he sent her to a cardiologist.  Reports saw cardiologist ?Dr. Mercado and reports was told this was not cardiologic in nature.  Reports has had 5 episodes of sudden weakness/fall since May 1, reports only fell once because the other 4 times was either seated or supine.  Denies loss of urinary or bowel continence. Denies double vision or loss of vision at this time.  Denies numbness or weakness of arms or legs at this time.  Denies chest pain, shortness of breath, palpitations. Denies fevers, chills.  Denies recent URI, cough.  Denies urinary symptoms.  Denies abdominal pain, vomiting, diarrhea, blood in stools. Denies chest pain, shortness of breath.  Reports allergy to Reglan ("lockjaw").  Denies EtOH, tobacco, drugs.  On exam, NAD, CN 2-12 grossly intact, head NCAT +tenderness to palpation at the R occiput, no overlying rash, no ecchymosis, PERRL, ROM at neck limited secondary to pain on R neck, no tenderness to palpation or stepoffs along length of spine, lungs CTAB with good inspiratory effort, +S1S2, no m/r/g, abdomen soft with +BS, NT, ND, no CVAT, moving all extremities with 5/5 strength bilateral upper and lower extremities, good and equal  strength bilaterally; A/P: 77F with R occipital head pain and repeated episodes of "blacking out", Ddx includes vascular insufficiency/stenosis (neck/head), ?complication from repeated injections, migraines, lower suspicion for arrythmia, will obtain labs, CT angio head and neck, EKG, monitor, declines pain control at present, suspect will need admission

## 2018-06-11 NOTE — ED PROVIDER NOTE - NEUROLOGICAL, MLM
Alert and oriented, no focal deficits, no motor or sensory deficits. pain with flexion and extension of the neck, exam limited by pain

## 2018-06-11 NOTE — ED ADULT NURSE NOTE - CHIEF COMPLAINT QUOTE
Currently no pain. Sent by Dr. Brown & Dr. Jeffries for admission. Has episodes where she "feel like she was whacked in the head and can't see for a few seconds". Last episode on May 30th. History of falls with these episodes.

## 2018-06-11 NOTE — ED PROVIDER NOTE - PROGRESS NOTE DETAILS
called neurosx to see if vertebral artery stenosis would be surgical, he states that it would be neuro and medical management - Jammie Marina PA-C Attending MD Osorio: Neuro bedside neurology states spoke with Dr. Chung who feels episodes are vasovagal syncope and CTA findings are chronic and therefore not contributing to symptoms - Jammie Marina PA-C neurology states spoke with Dr. Chung who feels episodes are vasovagal syncope and CTA findings are chronic and therefore not contributing to symptoms patient already on asa plavix for CAD with stents- Jammie Marina PA-C neurology states spoke with Dr. Chung who feels episodes are vasovagal syncope and CTA findings are chronic and therefore not contributing to symptoms patient already on asa plavix for CAD with stents- Jammie Marina PA-C    Attending MD Osorio: As above, appreciate consult but given description of symptoms do not agree that this is vasovagal.  Given falls and described symptoms will admit patient for further work up.

## 2018-06-11 NOTE — ED ADULT NURSE NOTE - OBJECTIVE STATEMENT
77 yr old female with heart dx, htn, dm and elevated cholesterol present to the ER from the cardiologist for dizziness. As per pt's daughter she had 7 episodes  for 2 weeks of dizzy spell associated with headache to the occipital area of head radiating to the neck. She also endorses that she has  short episodes of loss of visions. As per daughter she saw the neurologist and had 3 brain Scan and cervical neck . As per daughter she had a call today from neurologist that she has an abnormal MRI/ MRA.  Pt reports pain level of 7/10 on pain scale and aching in quality. Denies nausea, vomiting. Pt is able to ambulate independently, gait straight at this time. Denies taking medication of the pain

## 2018-06-11 NOTE — ED PROVIDER NOTE - OBJECTIVE STATEMENT
76 y/o female hx of HTN, HLD, DM, occipital neuralgia for which she gets injections most recently may 2018, subclavian stenosisi who presents from PMD  for severe occipital headaches that have been occuring intermittently at random over the last few weeks. states she will feel like she was hit in the back of the head with a shovel, loose vision for 10-15 seconds and then HA will remain. reports she had an episode once so severe a few weeks ago that she fell.  patient states this pain is different than typical headaches. she went to a neurologist who ordered MRI and MRA. MRI showed chronic microvascular ischemia, MRI neck showed c2-3 and 6-7 disc herniation with nerve root compression.  MRA of the neck was degraded by motion. she was also sent to a cardiologist and "was cleared from cardiac perspective" patients PMD Esa hooks was concerned for posterior circulation issue and wanted imaging of the neck. patient with mild headache now. 78 y/o female hx of HTN, HLD, DM, occipital neuralgia for which she gets injections most recently may 2018, subclavian stenosisi who presents from PMD  for severe occipital headaches that have been occuring intermittently at random over the last few weeks. states she will feel like she was hit in the back of the head with a shovel, loose vision for 10-15 seconds and then HA will remain. reports she had an episode once so severe a few weeks ago that she fell.  patient states this pain is different than typical headaches. she went to a neurologist who ordered MRI and MRA. MRI showed chronic microvascular ischemia, MRI neck showed c2-3 and 6-7 disc herniation with nerve root compression.  MRA of the neck was degraded by motion. she was also sent to a cardiologist and "was cleared from cardiac perspective" patients PMD   was concerned for posterior circulation issue and wanted imaging of the neck. patient with mild headache now.

## 2018-06-12 DIAGNOSIS — R51 HEADACHE: ICD-10-CM

## 2018-06-12 DIAGNOSIS — I25.118 ATHEROSCLEROTIC HEART DISEASE OF NATIVE CORONARY ARTERY WITH OTHER FORMS OF ANGINA PECTORIS: ICD-10-CM

## 2018-06-12 DIAGNOSIS — I65.03 OCCLUSION AND STENOSIS OF BILATERAL VERTEBRAL ARTERIES: ICD-10-CM

## 2018-06-12 DIAGNOSIS — R55 SYNCOPE AND COLLAPSE: ICD-10-CM

## 2018-06-12 DIAGNOSIS — I10 ESSENTIAL (PRIMARY) HYPERTENSION: ICD-10-CM

## 2018-06-12 PROBLEM — I70.8 RIGHT SUBCLAVIAN ARTERY OCCLUSION: Status: ACTIVE | Noted: 2017-07-24

## 2018-06-12 LAB
ANION GAP SERPL CALC-SCNC: 13 MMOL/L — SIGNIFICANT CHANGE UP (ref 5–17)
BASOPHILS # BLD AUTO: 0 K/UL — SIGNIFICANT CHANGE UP (ref 0–0.2)
BASOPHILS NFR BLD AUTO: 0.1 % — SIGNIFICANT CHANGE UP (ref 0–2)
BUN SERPL-MCNC: 17 MG/DL — SIGNIFICANT CHANGE UP (ref 7–23)
CALCIUM SERPL-MCNC: 9.9 MG/DL — SIGNIFICANT CHANGE UP (ref 8.4–10.5)
CHLORIDE SERPL-SCNC: 100 MMOL/L — SIGNIFICANT CHANGE UP (ref 96–108)
CK MB CFR SERPL CALC: 1.4 NG/ML — SIGNIFICANT CHANGE UP (ref 0–3.8)
CK MB CFR SERPL CALC: 1.6 NG/ML — SIGNIFICANT CHANGE UP (ref 0–3.8)
CK SERPL-CCNC: 29 U/L — SIGNIFICANT CHANGE UP (ref 25–170)
CK SERPL-CCNC: 30 U/L — SIGNIFICANT CHANGE UP (ref 25–170)
CO2 SERPL-SCNC: 28 MMOL/L — SIGNIFICANT CHANGE UP (ref 22–31)
CREAT SERPL-MCNC: 0.73 MG/DL — SIGNIFICANT CHANGE UP (ref 0.5–1.3)
EOSINOPHIL # BLD AUTO: 0.1 K/UL — SIGNIFICANT CHANGE UP (ref 0–0.5)
EOSINOPHIL NFR BLD AUTO: 1.7 % — SIGNIFICANT CHANGE UP (ref 0–6)
GLUCOSE BLDC GLUCOMTR-MCNC: 108 MG/DL — HIGH (ref 70–99)
GLUCOSE BLDC GLUCOMTR-MCNC: 121 MG/DL — HIGH (ref 70–99)
GLUCOSE BLDC GLUCOMTR-MCNC: 143 MG/DL — HIGH (ref 70–99)
GLUCOSE BLDC GLUCOMTR-MCNC: 207 MG/DL — HIGH (ref 70–99)
GLUCOSE SERPL-MCNC: 121 MG/DL — HIGH (ref 70–99)
HCT VFR BLD CALC: 49.6 % — HIGH (ref 34.5–45)
HGB BLD-MCNC: 16.3 G/DL — HIGH (ref 11.5–15.5)
LYMPHOCYTES # BLD AUTO: 1.6 K/UL — SIGNIFICANT CHANGE UP (ref 1–3.3)
LYMPHOCYTES # BLD AUTO: 29 % — SIGNIFICANT CHANGE UP (ref 13–44)
MCHC RBC-ENTMCNC: 30.1 PG — SIGNIFICANT CHANGE UP (ref 27–34)
MCHC RBC-ENTMCNC: 32.9 GM/DL — SIGNIFICANT CHANGE UP (ref 32–36)
MCV RBC AUTO: 91.3 FL — SIGNIFICANT CHANGE UP (ref 80–100)
MONOCYTES # BLD AUTO: 0.7 K/UL — SIGNIFICANT CHANGE UP (ref 0–0.9)
MONOCYTES NFR BLD AUTO: 12.6 % — SIGNIFICANT CHANGE UP (ref 2–14)
NEUTROPHILS # BLD AUTO: 3.1 K/UL — SIGNIFICANT CHANGE UP (ref 1.8–7.4)
NEUTROPHILS NFR BLD AUTO: 56.5 % — SIGNIFICANT CHANGE UP (ref 43–77)
PLATELET # BLD AUTO: 171 K/UL — SIGNIFICANT CHANGE UP (ref 150–400)
POTASSIUM SERPL-MCNC: 4 MMOL/L — SIGNIFICANT CHANGE UP (ref 3.5–5.3)
POTASSIUM SERPL-SCNC: 4 MMOL/L — SIGNIFICANT CHANGE UP (ref 3.5–5.3)
RBC # BLD: 5.43 M/UL — HIGH (ref 3.8–5.2)
RBC # FLD: 12.2 % — SIGNIFICANT CHANGE UP (ref 10.3–14.5)
SODIUM SERPL-SCNC: 141 MMOL/L — SIGNIFICANT CHANGE UP (ref 135–145)
TROPONIN T SERPL-MCNC: <0.01 NG/ML — SIGNIFICANT CHANGE UP (ref 0–0.06)
TROPONIN T SERPL-MCNC: <0.01 NG/ML — SIGNIFICANT CHANGE UP (ref 0–0.06)
WBC # BLD: 5.6 K/UL — SIGNIFICANT CHANGE UP (ref 3.8–10.5)
WBC # FLD AUTO: 5.6 K/UL — SIGNIFICANT CHANGE UP (ref 3.8–10.5)

## 2018-06-12 PROCEDURE — 70544 MR ANGIOGRAPHY HEAD W/O DYE: CPT | Mod: 26

## 2018-06-12 PROCEDURE — 99223 1ST HOSP IP/OBS HIGH 75: CPT

## 2018-06-12 PROCEDURE — 12345: CPT | Mod: NC

## 2018-06-12 PROCEDURE — 70549 MR ANGIOGRAPH NECK W/O&W/DYE: CPT | Mod: 26

## 2018-06-12 PROCEDURE — 93306 TTE W/DOPPLER COMPLETE: CPT | Mod: 26

## 2018-06-12 RX ORDER — DEXTROSE 50 % IN WATER 50 %
15 SYRINGE (ML) INTRAVENOUS ONCE
Qty: 0 | Refills: 0 | Status: DISCONTINUED | OUTPATIENT
Start: 2018-06-12 | End: 2018-06-16

## 2018-06-12 RX ORDER — ASPIRIN/CALCIUM CARB/MAGNESIUM 324 MG
81 TABLET ORAL DAILY
Qty: 0 | Refills: 0 | Status: DISCONTINUED | OUTPATIENT
Start: 2018-06-12 | End: 2018-06-14

## 2018-06-12 RX ORDER — KETOROLAC TROMETHAMINE 30 MG/ML
30 SYRINGE (ML) INJECTION EVERY 8 HOURS
Qty: 0 | Refills: 0 | Status: DISCONTINUED | OUTPATIENT
Start: 2018-06-12 | End: 2018-06-15

## 2018-06-12 RX ORDER — IRBESARTAN AND HYDROCHLOROTHIAZIDE 12.5; 3 MG/1; MG/1
1 TABLET ORAL DAILY
Qty: 0 | Refills: 0 | Status: DISCONTINUED | OUTPATIENT
Start: 2018-06-12 | End: 2018-06-16

## 2018-06-12 RX ORDER — OMEPRAZOLE 10 MG/1
40 CAPSULE, DELAYED RELEASE ORAL DAILY
Qty: 0 | Refills: 0 | Status: DISCONTINUED | OUTPATIENT
Start: 2018-06-12 | End: 2018-06-14

## 2018-06-12 RX ORDER — INSULIN LISPRO 100/ML
VIAL (ML) SUBCUTANEOUS
Qty: 0 | Refills: 0 | Status: DISCONTINUED | OUTPATIENT
Start: 2018-06-12 | End: 2018-06-16

## 2018-06-12 RX ORDER — KETOROLAC TROMETHAMINE 30 MG/ML
10 SYRINGE (ML) INJECTION EVERY 8 HOURS
Qty: 0 | Refills: 0 | Status: DISCONTINUED | OUTPATIENT
Start: 2018-06-12 | End: 2018-06-12

## 2018-06-12 RX ORDER — INSULIN LISPRO 100/ML
VIAL (ML) SUBCUTANEOUS AT BEDTIME
Qty: 0 | Refills: 0 | Status: DISCONTINUED | OUTPATIENT
Start: 2018-06-12 | End: 2018-06-16

## 2018-06-12 RX ORDER — ROSUVASTATIN CALCIUM 5 MG/1
20 TABLET ORAL AT BEDTIME
Qty: 0 | Refills: 0 | Status: DISCONTINUED | OUTPATIENT
Start: 2018-06-12 | End: 2018-06-16

## 2018-06-12 RX ORDER — CLOPIDOGREL BISULFATE 75 MG/1
75 TABLET, FILM COATED ORAL DAILY
Qty: 0 | Refills: 0 | Status: DISCONTINUED | OUTPATIENT
Start: 2018-06-12 | End: 2018-06-16

## 2018-06-12 RX ORDER — DEXTROSE 50 % IN WATER 50 %
12.5 SYRINGE (ML) INTRAVENOUS ONCE
Qty: 0 | Refills: 0 | Status: DISCONTINUED | OUTPATIENT
Start: 2018-06-12 | End: 2018-06-16

## 2018-06-12 RX ORDER — KETOROLAC TROMETHAMINE 30 MG/ML
15 SYRINGE (ML) INJECTION EVERY 8 HOURS
Qty: 0 | Refills: 0 | Status: DISCONTINUED | OUTPATIENT
Start: 2018-06-12 | End: 2018-06-12

## 2018-06-12 RX ORDER — SODIUM CHLORIDE 9 MG/ML
1000 INJECTION, SOLUTION INTRAVENOUS
Qty: 0 | Refills: 0 | Status: DISCONTINUED | OUTPATIENT
Start: 2018-06-12 | End: 2018-06-16

## 2018-06-12 RX ORDER — HEPARIN SODIUM 5000 [USP'U]/ML
5000 INJECTION INTRAVENOUS; SUBCUTANEOUS EVERY 8 HOURS
Qty: 0 | Refills: 0 | Status: DISCONTINUED | OUTPATIENT
Start: 2018-06-12 | End: 2018-06-16

## 2018-06-12 RX ORDER — AMLODIPINE BESYLATE 2.5 MG/1
10 TABLET ORAL DAILY
Qty: 0 | Refills: 0 | Status: DISCONTINUED | OUTPATIENT
Start: 2018-06-12 | End: 2018-06-16

## 2018-06-12 RX ADMIN — HEPARIN SODIUM 5000 UNIT(S): 5000 INJECTION INTRAVENOUS; SUBCUTANEOUS at 21:19

## 2018-06-12 RX ADMIN — Medication 15 MILLIGRAM(S): at 10:45

## 2018-06-12 RX ADMIN — ROSUVASTATIN CALCIUM 20 MILLIGRAM(S): 5 TABLET ORAL at 21:12

## 2018-06-12 RX ADMIN — Medication 15 MILLIGRAM(S): at 19:09

## 2018-06-12 RX ADMIN — CLOPIDOGREL BISULFATE 75 MILLIGRAM(S): 75 TABLET, FILM COATED ORAL at 10:00

## 2018-06-12 RX ADMIN — AMLODIPINE BESYLATE 10 MILLIGRAM(S): 2.5 TABLET ORAL at 10:02

## 2018-06-12 RX ADMIN — Medication 30 MILLIGRAM(S): at 22:10

## 2018-06-12 RX ADMIN — OMEPRAZOLE 40 MILLIGRAM(S): 10 CAPSULE, DELAYED RELEASE ORAL at 10:01

## 2018-06-12 RX ADMIN — Medication 30 MILLIGRAM(S): at 21:09

## 2018-06-12 RX ADMIN — Medication 81 MILLIGRAM(S): at 10:00

## 2018-06-12 NOTE — H&P ADULT - NSHPPHYSICALEXAM_GEN_ALL_CORE
PHYSICAL EXAM:  GENERAL: NAD, well-groomed, well-developed  HEAD:  Atraumatic, Normocephalic - severe tenderness to palpation of right occiput  EYES: EOMI, PERRLA, conjunctiva and sclera clear. No nystagmus.  ENMT: No tonsillar erythema, exudates, or enlargement; Moist mucous membranes, Good dentition, No lesions  NECK: Supple, No JVD, Normal thyroid  CHEST/LUNG: Clear to percussion bilaterally; No rales, rhonchi, wheezing, or rubs  HEART: Regular rate and rhythm; No murmurs, rubs, or gallops, no LE edema.   ABDOMEN: Soft, Nontender, Nondistended; Bowel sounds present  EXTREMITIES:  2+ Peripheral Pulses, No clubbing, cyanosis, or edema  LYMPH: No lymphadenopathy noted  SKIN: No rashes or lesions  NERVOUS SYSTEM:  Alert & Oriented X3, Good concentration; Motor Strength 5/5 B/L upper and lower extremities.. Unable to lift right arm above head.

## 2018-06-12 NOTE — H&P ADULT - NSHPREVIEWOFSYSTEMS_GEN_ALL_CORE
REVIEW OF SYSTEMS:  CONSTITUTIONAL: No weakness. No fevers. No chills. No rigors. No weight loss. No poor appetite.  EYES: +visual changes/loss. No eye pain.  ENT: No hearing difficulty. No vertigo. No dysphagia. No Sinusitis/rhinorrhea.  NECK: No pain. No stiffness/rigidity.  CARDIAC: No chest pain. No palpitations. +fall/syncope  RESPIRATORY: No cough. No SOB. No hemoptysis.  GASTROINTESTINAL: No abdominal pain. No nausea. No vomiting. No hematemesis. No diarrhea. No constipation. No melena. No hematochezia.  GENITOURINARY: No dysuria. No frequency. No hesitancy. No hematuria.  NEUROLOGICAL: No numbness/tingling. No focal weakness. No incontinence. +headache.  BACK: +back pain.  EXTREMITIES: No lower extremity edema. Full ROM. + leg pain. Unable to raise right arm 2/2 rotator cuff issue.  SKIN: No rashes. No itching. No other lesions.  ALLERGIC: No lip swelling. No hives.  All other review of systems is negative unless indicated above.  Unless indicated above, unable to assess ROS 2/2

## 2018-06-12 NOTE — H&P ADULT - PROBLEM SELECTOR PLAN 2
- per neuro, likely vasovagal syncope 2/2 severe pain  - last TTE was 2013, grossly normal  - tele, trend 1 more enzymes, TTE  - less likely cardiac

## 2018-06-12 NOTE — PROGRESS NOTE ADULT - PROBLEM SELECTOR PLAN 2
Neurosurgeyr appreciated.  -concerning givne loss of vision with extension of neck, b/l vertebral a. stenosis  -wll obtain MR NOVA and may need angiogram per nsx.

## 2018-06-12 NOTE — CONSULT NOTE ADULT - ASSESSMENT
77F here with headaches and episodes of blacking out found to have vertebral atherosclerosis  - no acute neurosurgical intervention  - MRA NOVA to assess for VBI, MR brain  - will cont to follow 77F here with headaches and episodes of blacking out found to have vertebral atherosclerosis  - no acute neurosurgical intervention  - MRA NOVA to assess for VBI, MR brain  - Will likely require medical management per neurology and outpatient fu with Dr. Scott (neurosurgery); may reconsult nsgy as needed

## 2018-06-12 NOTE — CONSULT NOTE ADULT - SUBJECTIVE AND OBJECTIVE BOX
p ([id number])     HPI:  77F c hx migraines, occipital neuralgia s/p many different injections, CAD s/p multiple stents (2007, 2013, 2017) on DAPT, HTN, HLD, DM2, multiple back surgeries c/b collapsed lung remotely, urinary incontinence, dysphagia, subclavian stenosis, presents with syncope and severe headache.    Pt has long history of headaches, migraines, and occipital neuralgia, and their treatments, which include, triptans, tramadol, toradol, botox injections, nerve blocks, other types of injections. Pt has very good documentation for her care, and tells me she has had "388 injections" in the past year. Starting in early May, pt reports having 7 episodes of sudden onset severe headaches that have gotten progressively longer, initially lasting 10 minutes "like a shovel hit the back of my head", then subsiding into a duller pain that can last anywhere from hours to many days, associated with sudden onset b/l painless complete vision loss lasting 10 seconds. The first 5 times this occurred when pt was sleeping and was awoke from sleep, and pt could not see out of her eyes. The last two times occurred May 21 when she was getting a breast sonogram, and on May 30 when she was at home placing contacts into her eyes. On May 30, pt states it came on suddenly, and she suddenly fell to the ground and was unable to move or see, despite being awake. Pt states the headache from May 30th has not complete gone away. She went to see her neurologist who ordered "5 different tests", and then also told her to go see her cardiologist. Pt went to see her cardiologist who was not available, and instead saw Dr. Brown who was covering, who told her to go to the hospital. Reportedly, MRI of pt's neck showed C2-C3/C6-C7 herniated disc with nerve root compression. Pt states her occipital headache is currently exacerbated by tilting her head backward, R occiput > L occiput. Pt states that toradol and nerve root block of the occiput has helped her occipital headache tremendously. Pt has had occipital neuralgia in the past, which feels like "a sharp knife stabbing" and different from these episodes. Pt also has hx of migraines with photophobia that can be terminated with her triptan.  Pt denies fevers, chills, URI symptoms, chest pain, SOB, palpitations, vomiting, diarrhea, abd pain. She has chronic urinary frequency. She also reports nausea when she was having a couple episodes of vertigo while on the MRI table, each episode lasting about 5minutes. Pt states she went to see a vertigo specialist for this. Pt also has hx of nystagmus that went away.    VS: Tm 98.2, P 57, /64, R 19, 98% RA (12 Jun 2018 06:27)    PAST MEDICAL HISTORY   Family history of coronary artery disease  Hypercholesteremia  Hypertension  MI (myocardial infarction)  CAD (coronary artery disease)    PAST SURGICAL HISTORY   S/P lumpectomy of breast  S/P cholecystectomy  H/O vaginal surgery  H/O fall  Rib deformity    Reglan (Other)      MEDICATIONS:  Antibiotics:    Neuro:  ketorolac   Injectable 30 milliGRAM(s) IV Push every 8 hours PRN    Anticoagulation:  aspirin enteric coated 81 milliGRAM(s) Oral daily  clopidogrel Tablet 75 milliGRAM(s) Oral daily  heparin  Injectable 5000 Unit(s) SubCutaneous every 8 hours    Other:  amLODIPine   Tablet 10 milliGRAM(s) Oral daily  dextrose 40% Gel 15 Gram(s) Oral once PRN  dextrose 5%. 1000 milliLiter(s) IV Continuous <Continuous>  dextrose 50% Injectable 12.5 Gram(s) IV Push once  insulin lispro (HumaLOG) corrective regimen sliding scale   SubCutaneous three times a day before meals  insulin lispro (HumaLOG) corrective regimen sliding scale   SubCutaneous at bedtime  irbesartan 300 mG/hydrochlorothiazide 12.5 mG 1 Tablet(s) Oral daily  omeprazole 40 milliGRAM(s) Oral daily  rosuvastatin 20 milliGRAM(s) Oral at bedtime      SOCIAL HISTORY:   Occupation:   Marital Status:     FAMILY HISTORY:  No pertinent family history in first degree relatives      REVIEW OF SYSTEMS:  Check here if all are normal other than Neurological []  General:  Eyes:  ENT:  Cardiac:  Respiratory:  GI:  Musculoskeletal:   Skin:  Neurologic:   Psychiatric:     PHYSICAL EXAMINATION:   T(C): 36.6 (06-12-18 @ 05:00), Max: 36.8 (06-12-18 @ 01:29)  HR: 63 (06-12-18 @ 10:03) (57 - 73)  BP: 141/72 (06-12-18 @ 10:03) (119/64 - 176/77)  RR: 17 (06-12-18 @ 05:00) (17 - 19)  SpO2: 98% (06-12-18 @ 05:00) (97% - 98%)  Wt(kg): --Height (cm): 162.56 (06-12 @ 02:12)  Weight (kg): 65.3 (06-12 @ 02:12)    General Examination:     Neurologic Examination:           AOx3, FC, PERRL, EOMI, V1-3 intact, no facial, palate cooper symmetric, tongue midline, shrug 5/5  5/5 throughout, no drift  SILT  No clonus or babinski    LABS:                        16.3   5.6   )-----------( 171      ( 12 Jun 2018 08:26 )             49.6     06-12    141  |  100  |  17  ----------------------------<  121<H>  4.0   |  28  |  0.73    Ca    9.9      12 Jun 2018 08:26    TPro  7.4  /  Alb  4.3  /  TBili  0.5  /  DBili  x   /  AST  20  /  ALT  17  /  AlkPhos  103  06-11    PT/INR - ( 11 Jun 2018 19:06 )   PT: 10.7 sec;   INR: 0.98 ratio         PTT - ( 11 Jun 2018 19:06 )  PTT:32.5 sec      RADIOLOGY & ADDITIONAL STUDIES:      IMPRESSION:   Noncontrast CT brain: There is no evidence of acute intracranial   hemorrhage, extra-axial collection, vasogenic edema, mass effect, midline   shift, central herniation, or hydrocephalus.     Moderate to severe nonocclusive atherosclerosis at the origin of the   left vertebral artery and moderate to severe stenosis of the right V4   segment. Severe stenosis versus occlusion of the left V4 segment with   reconstitution distally.    Mild stenosis of the internal carotid arteries bilaterally.    CT angiography brain: No other major vessel occlusion or stenosis.          ***Please see the addendum at the top of this report. It may contain   additional important information or changes.****      AL ELLSWORTH M.D., RADIOLOGY RESIDENT  This document has been electronically signed.  TRISTIN ARGUELLO M.D., ATTENDING RADIOLOGIST  This document has been electronically signed. Jun 11 2018  9:03PM  Addend:  TRISTIN ARGUELLO M.D., ATTENDING RADIOLOGIST  This addendum was electronically signed on: Jun 11 2018  9:11PM.

## 2018-06-12 NOTE — H&P ADULT - NSHPSOCIALHISTORY_GEN_ALL_CORE
Social History:    Marital Status:  (   )    ( x  ) Single    (   )    (  )   Occupation: retired [employer]  children: 2  Lives with: (x  ) alone  (  ) children   (  ) spouse   (  ) parents  (  ) other    Substance Use (street drugs): ( x ) never used  (  ) other:  Tobacco Usage:  ( x  ) never smoked   (   ) former smoker   (   ) current smoker  (     ) pack year  (        ) last cigarette date  Alcohol Usage: half glass of wine 1 week ago  Sexual History:

## 2018-06-12 NOTE — CONSULT NOTE ADULT - ATTENDING COMMENTS
Patient personally seen and examined and agree with note as above.  Dr. Scott's team will be commenting on vertebral stenosis (vascular neurosurgery), however likely conservative management.

## 2018-06-12 NOTE — PROGRESS NOTE ADULT - SUBJECTIVE AND OBJECTIVE BOX
Authored by Parviz Kevin, DO: Beeper#[phone]    BAYRON ORTIZ  77y  Female      Patient is a 77y old  Female who presents with a chief complaint of severe headache and syncope (12 Jun 2018 06:27)      INTERVAL HPI/OVERNIGHT EVENTS:  patient admitted overnight.  Please see full HPI from excellent h/P performed by Dr. Kaiser.  Patient states headache 8/10, occipital in nature.  Toradol 15mg IV " took the edge off". Upon hearing story, patient states 5 episodes she lost vision suddenly and regained suddenly after 15 seconds.  Patient withtout LOC during that time or when she fell on floor.  Majority of these events occurred during head extension.        T(C): 36.5 (06-12-18 @ 12:22), Max: 36.8 (06-12-18 @ 01:29)  HR: 60 (06-12-18 @ 12:22) (57 - 73)  BP: 127/76 (06-12-18 @ 12:22) (119/64 - 176/77)  RR: 17 (06-12-18 @ 12:22) (17 - 19)  SpO2: 98% (06-12-18 @ 12:22) (97% - 98%)  Wt(kg): --Vital Signs Last 24 Hrs  T(C): 36.5 (12 Jun 2018 12:22), Max: 36.8 (12 Jun 2018 01:29)  T(F): 97.7 (12 Jun 2018 12:22), Max: 98.2 (12 Jun 2018 01:29)  HR: 60 (12 Jun 2018 12:22) (57 - 73)  BP: 127/76 (12 Jun 2018 12:22) (119/64 - 176/77)  BP(mean): --  RR: 17 (12 Jun 2018 12:22) (17 - 19)  SpO2: 98% (12 Jun 2018 12:22) (97% - 98%)    PHYSICAL EXAM:  GENERAL: NAD, well-groomed, well-developed  HEAD:  Atraumatic, Normocephalic. TTP occiiptal sulci b/l .  EYES: EOMI, PERRLA, conjunctiva and sclera clear  NECK: Supple, No JVD, Normal thyroid  CHEST/LUNG: Clear to percussion bilaterally; No rales, rhonchi, wheezing, or rubs  HEART: Regular rate and rhythm; II/VI systolic murmur  ABDOMEN: Soft, Nontender, Nondistended; Bowel sounds present.  No hepatosplenomegaly  EXTREMITIES:  2+ Peripheral Pulses, No clubbing, cyanosis, or edema  PSYCH: Alert & Oriented x3  NERVOUS SYSTEM:  ; Motor Strength 5/5 B/L upper and lower extremities.  Sensory intact    Consultant(s) Notes Reviewed:  [x ] YES  [ ] NO  Care Discussed with Consultants/Other Providers [ x] YES  [ ] NO: Neurology resident, Neurosurgeyr resident.     LABS:                        16.3   5.6   )-----------( 171      ( 12 Jun 2018 08:26 )             49.6     06-12    141  |  100  |  17  ----------------------------<  121<H>  4.0   |  28  |  0.73    Ca    9.9      12 Jun 2018 08:26    TPro  7.4  /  Alb  4.3  /  TBili  0.5  /  DBili  x   /  AST  20  /  ALT  17  /  AlkPhos  103  06-11    PT/INR - ( 11 Jun 2018 19:06 )   PT: 10.7 sec;   INR: 0.98 ratio         PTT - ( 11 Jun 2018 19:06 )  PTT:32.5 sec    CAPILLARY BLOOD GLUCOSE      POCT Blood Glucose.: 143 mg/dL (12 Jun 2018 11:06)  POCT Blood Glucose.: 121 mg/dL (12 Jun 2018 07:13)            RADIOLOGY & ADDITIONAL TESTS:    Imaging Personally Reviewed:  [x ] YES  [ ] NO  < from: CT Angio Neck w/ IV Cont (06.11.18 @ 20:17) >  IMPRESSION:   Noncontrast CT brain: There is no evidence of acute intracranial   hemorrhage, extra-axial collection, vasogenic edema, mass effect, midline   shift, central herniation, or hydrocephalus.     Moderate to severe nonocclusive atherosclerosis at the origin of the   left vertebral artery and moderate to severe stenosis of the right V4   segment. Severe stenosis versus occlusion of the left V4 segment with   reconstitution distally.    Mild stenosis of the internal carotid arteries bilaterally.    CT angiography brain: No other major vessel occlusion or stenosis.    < end of copied text >

## 2018-06-12 NOTE — H&P ADULT - PROBLEM SELECTOR PLAN 1
- severe headaches  - f/u neuro recs  - unclear etiology, but hx/exam most likely consistent with occipital neuralgia. ddx includes poss basilar artery migraines  - will try toradol 15 IV now  - f/u with neurologist for continued nerve block/botox/other treatments  - f/u with outpt neurologist regarding results of tests and any other further recommendations  - consider starting muscle relaxers  - unclear clinical significance of b/l distal vertebral artery stenoses

## 2018-06-12 NOTE — H&P ADULT - NSHPLABSRESULTS_GEN_ALL_CORE
Personally reviewed labs.   Personally reviewed imaging.   Personally reviewed EKG. Sinus, rate 57, . Q wave in III/V1-V5. Ant fascic block. No twi or ST change.                          15.0   7.0   )-----------( 173      ( 11 Jun 2018 19:06 )             45.3       06-11    141  |  102  |  19  ----------------------------<  114<H>  4.1   |  25  |  0.80    Ca    9.8      11 Jun 2018 19:05    TPro  7.4  /  Alb  4.3  /  TBili  0.5  /  DBili  x   /  AST  20  /  ALT  17  /  AlkPhos  103  06-11            LIVER FUNCTIONS - ( 11 Jun 2018 19:05 )  Alb: 4.3 g/dL / Pro: 7.4 g/dL / ALK PHOS: 103 U/L / ALT: 17 U/L / AST: 20 U/L / GGT: x             PT/INR - ( 11 Jun 2018 19:06 )   PT: 10.7 sec;   INR: 0.98 ratio         PTT - ( 11 Jun 2018 19:06 )  PTT:32.5 sec

## 2018-06-12 NOTE — H&P ADULT - ASSESSMENT
77F c hx migraines, occipital neuralgia s/p many different injections, CAD s/p multiple stents (2007, 2013, 2017) on DAPT, HTN, HLD, DM2, multiple back surgeries c/b collapsed lung remotely, urinary incontinence, dysphagia, subclavian stenosis, presents with syncope and severe headache.

## 2018-06-12 NOTE — H&P ADULT - HISTORY OF PRESENT ILLNESS
77F c hx migraines, occipital neuralgia s/p many different injections, CAD s/p multiple stents (2007, 2013, 2017) on DAPT, HTN, HLD, DM2, multiple back surgeries c/b collapsed lung remotely, urinary incontinence, dysphagia, subclavian stenosis, presents with syncope and severe headache.    Pt has long history of headaches, migraines, and occipital neuralgia, and their treatments, which include, triptans, tramadol, toradol, botox injections, nerve blocks, other types of injections. Pt has very good documentation for her care, and tells me she has had "388 injections" in the past year. Starting in early May, pt reports having 7 episodes of sudden onset severe headaches that have gotten progressively longer, initially lasting 10 minutes "like a shovel hit the back of my head", then subsiding into a duller pain that can last anywhere from hours to many days, associated with sudden onset b/l painless complete vision loss lasting 10 seconds. The first 5 times this occurred when pt was sleeping and was awoke from sleep, and pt could not see out of her eyes. The last two times occurred May 21 when she was getting a breast sonogram, and on May 30 when she was at home placing contacts into her eyes. On May 30, pt states it came on suddenly, and she suddenly fell to the ground and was unable to move or see, despite being awake. Pt states the headache from May 30th has not complete gone away. She went to see her neurologist who ordered "5 different tests", and then also told her to go see her cardiologist. Pt went to see her cardiologist who was not available, and instead saw Dr. Brown who was covering, who told her to go to the hospital. Reportedly, MRI of pt's neck showed C2-C3/C6-C7 herniated disc with nerve root compression. Pt states her occipital headache is currently exacerbated by tilting her head backward, R occiput > L occiput. Pt states that toradol and nerve root block of the occiput has helped her occipital headache tremendously. Pt has had occipital neuralgia in the past, which feels like "a sharp knife stabbing" and different from these episodes. Pt also has hx of migraines with photophobia that can be terminated with her triptan.  Pt denies fevers, chills, URI symptoms, chest pain, SOB, palpitations, vomiting, diarrhea, abd pain. She has chronic urinary frequency. She also reports nausea when she was having a couple episodes of vertigo while on the MRI table, each episode lasting about 5minutes. Pt states she went to see a vertigo specialist for this. Pt also has hx of nystagmus that went away.    VS: Tm 98.2, P 57, /64, R 19, 98% RA

## 2018-06-12 NOTE — CONSULT NOTE ADULT - ASSESSMENT
76 y/o female hx of HTN, HLD, DM, Migraine, occipital neuralgia for which she gets injections most recently June 2018, subclavian stenosis who presented for eval of black out episodes.  She currently c/o of R occipital pain with trigger point. Pain is constant, reproducible with palpation, severity increases on touching trigger points to 10/10, baseline pain of 4-5/10. She denied no weakness, numbness, change in speech and vision. Her neurological examination is normal.     Impression     Possible vasovagal syncope from sleeping position/turning provoking activation of vagus nerve from pain ( First thing she will feel that someone is smacking her head(pain) ---> episode of black out (from decrease cardiac output from vagus nerve activation and return of her vision after 15 seconds ( return of spontaneous circulation).     Plan    No further work up indicated at this point, she should follow outpatient with her neurology   She reported Toradol as best headache medications for her. Give Toradol 30 mg IV every 8 hours - for 3 doses and then PRN , it will also help with occipital neuralgia.   She is currently taking aspirin and Plavix for Stenting due to CAD and also on statins. No further treatment or workup indicated for vertebral stenosis.     Case was discussed with

## 2018-06-12 NOTE — PROGRESS NOTE ADULT - ASSESSMENT
77F c hx migraines, occipital neuralgia s/p many different injections, CAD s/p multiple stents (2007, 2013, 2017) on DAPT, HTN, HLD, DM2, multiple back surgeries c/b collapsed lung remotely, urinary incontinence, dysphagia, subclavian stenosis, presents with worsening headahce, transient vision loss.

## 2018-06-13 DIAGNOSIS — Z29.9 ENCOUNTER FOR PROPHYLACTIC MEASURES, UNSPECIFIED: ICD-10-CM

## 2018-06-13 LAB
ANION GAP SERPL CALC-SCNC: 13 MMOL/L — SIGNIFICANT CHANGE UP (ref 5–17)
BUN SERPL-MCNC: 29 MG/DL — HIGH (ref 7–23)
CALCIUM SERPL-MCNC: 8.9 MG/DL — SIGNIFICANT CHANGE UP (ref 8.4–10.5)
CHLORIDE SERPL-SCNC: 104 MMOL/L — SIGNIFICANT CHANGE UP (ref 96–108)
CHOLEST SERPL-MCNC: 190 MG/DL — SIGNIFICANT CHANGE UP (ref 10–199)
CO2 SERPL-SCNC: 24 MMOL/L — SIGNIFICANT CHANGE UP (ref 22–31)
CREAT SERPL-MCNC: 0.86 MG/DL — SIGNIFICANT CHANGE UP (ref 0.5–1.3)
CRP SERPL-MCNC: <0.2 MG/DL — SIGNIFICANT CHANGE UP (ref 0–0.4)
ERYTHROCYTE [SEDIMENTATION RATE] IN BLOOD: 7 MM/HR — SIGNIFICANT CHANGE UP (ref 0–20)
GLUCOSE BLDC GLUCOMTR-MCNC: 104 MG/DL — HIGH (ref 70–99)
GLUCOSE BLDC GLUCOMTR-MCNC: 106 MG/DL — HIGH (ref 70–99)
GLUCOSE BLDC GLUCOMTR-MCNC: 121 MG/DL — HIGH (ref 70–99)
GLUCOSE BLDC GLUCOMTR-MCNC: 129 MG/DL — HIGH (ref 70–99)
GLUCOSE SERPL-MCNC: 102 MG/DL — HIGH (ref 70–99)
HBA1C BLD-MCNC: 6.2 % — HIGH (ref 4–5.6)
HCT VFR BLD CALC: 41.5 % — SIGNIFICANT CHANGE UP (ref 34.5–45)
HDLC SERPL-MCNC: 66 MG/DL — SIGNIFICANT CHANGE UP (ref 40–125)
HGB BLD-MCNC: 13.8 G/DL — SIGNIFICANT CHANGE UP (ref 11.5–15.5)
LIPID PNL WITH DIRECT LDL SERPL: 89 MG/DL — SIGNIFICANT CHANGE UP
MCHC RBC-ENTMCNC: 30.7 PG — SIGNIFICANT CHANGE UP (ref 27–34)
MCHC RBC-ENTMCNC: 33.3 GM/DL — SIGNIFICANT CHANGE UP (ref 32–36)
MCV RBC AUTO: 92.3 FL — SIGNIFICANT CHANGE UP (ref 80–100)
PA ADP PRP-ACNC: 162 PRU — LOW (ref 194–417)
PLATELET # BLD AUTO: 148 K/UL — LOW (ref 150–400)
POTASSIUM SERPL-MCNC: 4.1 MMOL/L — SIGNIFICANT CHANGE UP (ref 3.5–5.3)
POTASSIUM SERPL-SCNC: 4.1 MMOL/L — SIGNIFICANT CHANGE UP (ref 3.5–5.3)
RBC # BLD: 4.5 M/UL — SIGNIFICANT CHANGE UP (ref 3.8–5.2)
RBC # FLD: 12.2 % — SIGNIFICANT CHANGE UP (ref 10.3–14.5)
SODIUM SERPL-SCNC: 141 MMOL/L — SIGNIFICANT CHANGE UP (ref 135–145)
TOTAL CHOLESTEROL/HDL RATIO MEASUREMENT: 2.9 RATIO — LOW (ref 3.3–7.1)
TRIGL SERPL-MCNC: 174 MG/DL — HIGH (ref 10–149)
TSH SERPL-MCNC: 3.15 UIU/ML — SIGNIFICANT CHANGE UP (ref 0.27–4.2)
WBC # BLD: 5.4 K/UL — SIGNIFICANT CHANGE UP (ref 3.8–10.5)
WBC # FLD AUTO: 5.4 K/UL — SIGNIFICANT CHANGE UP (ref 3.8–10.5)

## 2018-06-13 PROCEDURE — 99223 1ST HOSP IP/OBS HIGH 75: CPT

## 2018-06-13 PROCEDURE — 99222 1ST HOSP IP/OBS MODERATE 55: CPT

## 2018-06-13 PROCEDURE — 70544 MR ANGIOGRAPHY HEAD W/O DYE: CPT | Mod: 26

## 2018-06-13 PROCEDURE — 99233 SBSQ HOSP IP/OBS HIGH 50: CPT

## 2018-06-13 RX ADMIN — CLOPIDOGREL BISULFATE 75 MILLIGRAM(S): 75 TABLET, FILM COATED ORAL at 06:10

## 2018-06-13 RX ADMIN — OMEPRAZOLE 40 MILLIGRAM(S): 10 CAPSULE, DELAYED RELEASE ORAL at 07:50

## 2018-06-13 RX ADMIN — Medication 30 MILLIGRAM(S): at 17:14

## 2018-06-13 RX ADMIN — HEPARIN SODIUM 5000 UNIT(S): 5000 INJECTION INTRAVENOUS; SUBCUTANEOUS at 06:08

## 2018-06-13 RX ADMIN — Medication 30 MILLIGRAM(S): at 06:45

## 2018-06-13 RX ADMIN — IRBESARTAN AND HYDROCHLOROTHIAZIDE 1 TABLET(S): 12.5; 3 TABLET ORAL at 06:11

## 2018-06-13 RX ADMIN — Medication 1 MILLIGRAM(S): at 14:30

## 2018-06-13 RX ADMIN — ROSUVASTATIN CALCIUM 20 MILLIGRAM(S): 5 TABLET ORAL at 22:33

## 2018-06-13 RX ADMIN — Medication 0.5 MILLIGRAM(S): at 22:32

## 2018-06-13 RX ADMIN — AMLODIPINE BESYLATE 10 MILLIGRAM(S): 2.5 TABLET ORAL at 06:08

## 2018-06-13 RX ADMIN — HEPARIN SODIUM 5000 UNIT(S): 5000 INJECTION INTRAVENOUS; SUBCUTANEOUS at 22:32

## 2018-06-13 RX ADMIN — Medication 30 MILLIGRAM(S): at 17:45

## 2018-06-13 RX ADMIN — Medication 30 MILLIGRAM(S): at 06:08

## 2018-06-13 RX ADMIN — HEPARIN SODIUM 5000 UNIT(S): 5000 INJECTION INTRAVENOUS; SUBCUTANEOUS at 13:28

## 2018-06-13 RX ADMIN — Medication 81 MILLIGRAM(S): at 06:16

## 2018-06-13 NOTE — PROGRESS NOTE ADULT - PROBLEM SELECTOR PLAN 2
Neurosurgeyr appreciated.  -concerning given loss of vision with extension of neck, b/l vertebral a. stenosis  -wll obtain MR NOVA and may need angiogram per nsx.  -possible intervention depends on the MR NOVA result

## 2018-06-13 NOTE — CONSULT NOTE ADULT - SUBJECTIVE AND OBJECTIVE BOX
HPI:  77F well known to me, coronary artery disease, coronary stents, no chest pain since last procedure greater than 6 months ago. Has migraines, occipital neuralgia s/p many different injections, Has multiple coronary stents (, , ) on DAPT, HTN, HLD, DM2, multiple back surgeries c/b collapsed lung remotely, urinary incontinence, dysphagia, subclavian stenosis, presents with syncope and severe headache.    Pt has long history of headaches, migraines, and occipital neuralgia, and their treatments, which include, triptans, tramadol, toradol, botox injections, nerve blocks, other types of injections. Pt has very good documentation for her care, and tells me she has had "388 injections" in the past year. Starting in early May, pt reports having 7 episodes of sudden onset severe headaches that have gotten progressively longer, initially lasting 10 minutes "like a shovel hit the back of my head", then subsiding into a duller pain that can last anywhere from hours to many days, associated with sudden onset b/l painless complete vision loss lasting 10 seconds. The first 5 times this occurred when pt was sleeping and was awoke from sleep, and pt could not see out of her eyes. The last two times occurred May 21 when she was getting a breast sonogram, and on May 30 when she was at home placing contacts into her eyes. On May 30, pt states it came on suddenly, and she suddenly fell to the ground and was unable to move or see, despite being awake. Pt states the headache from May 30th has not complete gone away. She went to see her neurologist who ordered "5 different tests", and then also told her to go see her cardiologist. Pt saw Dr. Brown who told her to go to the hospital. Reportedly, MRI of pt's neck showed C2-C3/C6-C7 herniated disc with nerve root compression. Pt states her occipital headache is currently exacerbated by tilting her head backward, R occiput > L occiput. Pt states that toradol and nerve root block of the occiput has helped her occipital headache tremendously. Pt has had occipital neuralgia in the past, which feels like "a sharp knife stabbing" and different from these episodes. Pt also has hx of migraines with photophobia that can be terminated with her triptan.  Pt denies fevers, chills, URI symptoms, chest pain, SOB, palpitations, vomiting, diarrhea, abd pain. She has chronic urinary frequency. She also reports nausea when she was having a couple episodes of vertigo while on the MRI table, each episode lasting about 5minutes. Pt states she went to see a vertigo specialist for this. Pt also has hx of nystagmus that went away.    VS: Tm 98.2, P 57, /64, R 19, 98% RA (2018 06:27)    PMH:   Family history of coronary artery disease  Hypercholesteremia  Hypertension  MI (myocardial infarction)  CAD (coronary artery disease)    PSH:   S/P lumpectomy of breast  S/P cholecystectomy  H/O vaginal surgery  H/O fall  Rib deformity    REVIEW OF SYSTEMS:  General: no fatigue/malaise, weight loss/gain.  Skin: no rashes.  Ophthalmologic: see HPI. 	  ENMT: no sore throat, rhinorrhea, sinus congestion.  Respiratory: no SOB, cough or wheeze.  Cardiovascular: no chest pain, dyspnea, palpitations, orthopnea or paroxysmal nocturnal dyspnea. No claudication.  Gastrointestinal:  no N/V/D, no melena/hematemesis/hematochezia.  Genitourinary: no dysuria/hesitancy or hematuria.  Musculoskeletal: no myalgias or arthralgias.  Neurological: no changes in vision or hearing, no lightheadedness/dizziness, no syncope/near syncope	  Psychiatric: appropriate.   Hematology/Lymphatics: no unusual bleeding, bruising and no lymphadenopathy.  Endocrine: no unusual thirst.   All others negative except as stated above and in HPI.     Medications:   amLODIPine   Tablet 10 milliGRAM(s) Oral daily  aspirin enteric coated 81 milliGRAM(s) Oral daily  clopidogrel Tablet 75 milliGRAM(s) Oral daily  dextrose 40% Gel 15 Gram(s) Oral once PRN  dextrose 5%. 1000 milliLiter(s) IV Continuous <Continuous>  dextrose 50% Injectable 12.5 Gram(s) IV Push once  heparin  Injectable 5000 Unit(s) SubCutaneous every 8 hours  insulin lispro (HumaLOG) corrective regimen sliding scale   SubCutaneous three times a day before meals  insulin lispro (HumaLOG) corrective regimen sliding scale   SubCutaneous at bedtime  irbesartan 300 mG/hydrochlorothiazide 12.5 mG 1 Tablet(s) Oral daily  ketorolac   Injectable 30 milliGRAM(s) IV Push every 8 hours PRN  omeprazole 40 milliGRAM(s) Oral daily  rosuvastatin 20 milliGRAM(s) Oral at bedtime    Allergies:  Reglan (Other)    FAMILY HISTORY:  No pertinent family history in first degree relatives    Social History:  Smoking: never  Alcohol:  Drugs:    Vital Signs Last 24 Hrs  T(C): 36.6 (2018 04:41), Max: 36.6 (2018 04:41)  T(F): 97.9 (2018 04:41), Max: 97.9 (2018 04:41)  HR: 51 (2018 04:41) (51 - 67)  BP: 124/70 (2018 04:41) (124/70 - 151/72)  BP(mean): --  RR: 17 (2018 04:41) (16 - 17)  SpO2: 98% (2018 04:41) (98% - 98%)    PHYSICAL EXAM:  Appearance: anxious, tearful after worrisome surgical news; generally well appearing, able to lie flat, breathing comfortably on RA  Eyes: PERRL, EOMI, pink conjunctiva  HENT: Normal oral mucosa  Respiratory: normal percussion without RR or W  Cardiovascular: apex non-displaced, RRR, S1 normal, S2 physiologic split, no murmurs, rubs, or gallops; no edema; no JVD  Gastrointestinal: soft, non-tender, non-distended with normal bowel sounds  Musculoskeletal: No clubbing; no joint deformity  Vascular: pulses symmetric and equal to DP/PT  Lymphatic: No lymphadenopathy  Skin: No rashes, stable ecchymoses, no cyanosis  Neurologic: Non-focal  Psychiatry: AAOx3, mood & affect appropriate.     Labs:                        13.8   5.4   )-----------( 148      ( 2018 03:04 )             41.5     06-13    141  |  104  |  29<H>  ----------------------------<  102<H>  4.1   |  24  |  0.86    Ca    8.9      2018 03:04    TPro  7.4  /  Alb  4.3  /  TBili  0.5  /  DBili  x   /  AST  20  /  ALT  17  /  AlkPhos  103  06-11    PT/INR - ( 2018 19:06 )   PT: 10.7 sec;   INR: 0.98 ratio         PTT - ( 2018 19:06 )  PTT:32.5 sec  CARDIAC MARKERS ( 2018 15:02 )  x     / <0.01 ng/mL / 30 U/L / x     / 1.4 ng/mL  CARDIAC MARKERS ( 2018 08:26 )  x     / <0.01 ng/mL / 29 U/L / x     / 1.6 ng/mL        Total Cholesterol: 190  LDL: 89  HDL: 66  T    Hemoglobin A1C, Whole Blood: 6.2 % ( @ 07:40)      Cardiovascular Diagnostic Testing:  ECG: < from: 12 Lead ECG (18 @ 19:36) >  SINUS BRADYCARDIA WITH 1ST DEGREE A-V BLOCK  LEFT AXIS DEVIATION  LOW VOLTAGE QRS  CANNOT RULE OUT ANTEROSEPTAL INFARCT , AGE UNDETERMINED    < end of copied text >      Echo: < from: Transthoracic Echocardiogram (18 @ 14:56) >  EF (Visual Estimate): 70-75 %  Mitral Valve:Mitral annular calcification. Minimal mitral  regurgitation.  Aortic Valve/Aorta: Calcified trileaflet aortic valve with  normal opening.  Normal aortic root size. (Ao: 3.2 cm at the sinuses of  Valsalva).  Left Atrium: Left atrium not well visualized.  Left Ventricle: Endocardium not well visualized; grossly  normal left ventricular systolic function. Concentric left  ventricular hypertrophy. Reversal of the E-A  waves of the  mitral inflow pattern is consistent with diastolic LV  dysfunction.  Right Heart: Normal right atrium. The right ventricle is  not well visualized; grossly normal right ventricular  systolic function. Normal tricuspid valve. Minimal  tricuspid regurgitation. Pulmonic valve not well  visualized, probably normal. Minimalpulmonic  regurgitation.  Pericardium/Pleura: No pericardial effusion seen.  Hemodynamic: Estimated right atrial pressure is 8 mm Hg.  Estimated right ventricular systolic pressure equals 26 mm  Hg, assuming right atrial pressure equals 8 mm Hg,  consistent with normal pulmonary pressures.  ------------------------------------------------------------------------  Conclusions:  1. Endocardium not well visualized; grossly normal left  ventricular systolic function.  2. Reversal of the E-A  waves ofthe mitral inflow pattern  is consistent with diastolic LV dysfunction.  3. The right ventricle is not well visualized; grossly  normal right ventricular systolic function.  4. No pericardial effusion seen.  *** Compared with echocardiogram of 2013, findings  are grossly similar.    < end of copied text >    Cath: < from: Cardiac Cath Lab - Adult (17 @ 10:17) >  CORONARY VESSELS: The coronary circulation is right dominant.  LM:   --  Ostial LM: There was a 30 % stenosis.  LAD:   --  Proximal LAD: There was a 50 % stenosis.  --  Mid LAD: The vessel was small to medium sized. Angiography showed  severe atherosclerosis.  CX:   --  Circumflex: The vessel was small sized. Angiography showed mild  atherosclerosis with no flow limiting lesions.  --  OM1: The vessel was very small sized. There was a tubular 100 %  stenosis. There was good collateral blood supply to the distal myocardium.  RI:   --  Proximal ramus intermedius: There was a tubular 60 % stenosis.  RCA:   --  Mid RCA: There was a tubular 25 % stenosis at the site of a  prior stent, in-stent.  --  RPL1: There was a tubular 90 % stenosis in the proximal third of the  vessel segment. In a second lesion, there was a tubular 10 % stenosis at  the site of a prior stent, in-stent.  COMPLICATIONS: There were no complications.  DIAGNOSTIC IMPRESSIONS: Unchanged left coronary arteries. Patent mid RCA  and RPL stents. New RPL1 lesion.  INTERVENTIONAL RECOMMENDATIONS: Aspirin and plavix.  Prepared and signed by  Israel Viera M.D.  Signed 11/10/2017 14:57:20    < end of copied text >      Interpretation of Telemetry: sinus rhythm    Imaging: < from: Xray Chest 2 Views PA/Lat (17 @ 17:11) >  The cardiac silhouette is unremarkable.    Clear lungs.  No pleural effusion.   No pneumothorax.    Multilevel degenerative changes of the spine.    IMPRESSION:   Clear lungs.    < end of copied text >

## 2018-06-13 NOTE — PROGRESS NOTE ADULT - SUBJECTIVE AND OBJECTIVE BOX
Patient is a 77y old  Female who presents with a chief complaint of severe headache and syncope (12 Jun 2018 06:27)      SUBJECTIVE / OVERNIGHT EVENTS:  no acute events overnight. vss, afebrile  pt is overwhelmed this morning with the all the new information and possible intervention  Pt complains of sharp pain in the back of the head    ROS:  14 point ROS negative in detail except stated as above    MEDICATIONS  (STANDING):  amLODIPine   Tablet 10 milliGRAM(s) Oral daily  aspirin enteric coated 81 milliGRAM(s) Oral daily  clopidogrel Tablet 75 milliGRAM(s) Oral daily  dextrose 5%. 1000 milliLiter(s) (50 mL/Hr) IV Continuous <Continuous>  dextrose 50% Injectable 12.5 Gram(s) IV Push once  heparin  Injectable 5000 Unit(s) SubCutaneous every 8 hours  insulin lispro (HumaLOG) corrective regimen sliding scale   SubCutaneous three times a day before meals  insulin lispro (HumaLOG) corrective regimen sliding scale   SubCutaneous at bedtime  irbesartan 300 mG/hydrochlorothiazide 12.5 mG 1 Tablet(s) Oral daily  omeprazole 40 milliGRAM(s) Oral daily  rosuvastatin 20 milliGRAM(s) Oral at bedtime    MEDICATIONS  (PRN):  dextrose 40% Gel 15 Gram(s) Oral once PRN Blood Glucose LESS THAN 70 milliGRAM(s)/deciliter  ketorolac   Injectable 30 milliGRAM(s) IV Push every 8 hours PRN Severe Pain (7 - 10)      CAPILLARY BLOOD GLUCOSE      POCT Blood Glucose.: 121 mg/dL (13 Jun 2018 11:38)  POCT Blood Glucose.: 104 mg/dL (13 Jun 2018 07:31)  POCT Blood Glucose.: 207 mg/dL (12 Jun 2018 21:17)  POCT Blood Glucose.: 108 mg/dL (12 Jun 2018 18:02)    I&O's Summary    12 Jun 2018 07:01  -  13 Jun 2018 07:00  --------------------------------------------------------  IN: 1070 mL / OUT: 0 mL / NET: 1070 mL    13 Jun 2018 07:01  -  13 Jun 2018 13:38  --------------------------------------------------------  IN: 240 mL / OUT: 0 mL / NET: 240 mL        PHYSICAL EXAM:  Vital Signs Last 24 Hrs  T(C): 36.6 (13 Jun 2018 12:56), Max: 36.6 (13 Jun 2018 04:41)  T(F): 97.8 (13 Jun 2018 12:56), Max: 97.9 (13 Jun 2018 04:41)  HR: 83 (13 Jun 2018 12:56) (51 - 83)  BP: 154/78 (13 Jun 2018 12:56) (124/70 - 154/78)  BP(mean): --  RR: 17 (13 Jun 2018 12:56) (16 - 17)  SpO2: 99% (13 Jun 2018 12:56) (98% - 99%)    GENERAL: NAD, well-developed  HEAD:  Atraumatic, Normocephalic  EYES: EOMI, PERRLA, conjunctiva and sclera clear  NECK: Supple, No JVD  CHEST/LUNG: Clear to auscultation bilaterally; No wheeze  HEART: Regular rate and rhythm; No murmurs, rubs, or gallops  ABDOMEN: Soft, Nontender, Nondistended; Bowel sounds present  EXTREMITIES:  2+ Peripheral Pulses, No clubbing, cyanosis, or edema  NEUROLOGY: AAOx3; non-focal  SKIN: No rashes or lesions    LABS:  personally reivewed                        13.8   5.4   )-----------( 148      ( 13 Jun 2018 03:04 )             41.5     06-13    141  |  104  |  29<H>  ----------------------------<  102<H>  4.1   |  24  |  0.86    Ca    8.9      13 Jun 2018 03:04    TPro  7.4  /  Alb  4.3  /  TBili  0.5  /  DBili  x   /  AST  20  /  ALT  17  /  AlkPhos  103  06-11    PT/INR - ( 11 Jun 2018 19:06 )   PT: 10.7 sec;   INR: 0.98 ratio         PTT - ( 11 Jun 2018 19:06 )  PTT:32.5 sec  CARDIAC MARKERS ( 12 Jun 2018 15:02 )  x     / <0.01 ng/mL / 30 U/L / x     / 1.4 ng/mL  CARDIAC MARKERS ( 12 Jun 2018 08:26 )  x     / <0.01 ng/mL / 29 U/L / x     / 1.6 ng/mL          RADIOLOGY & ADDITIONAL TESTS:    Imaging Personally Reviewed:    Consultant(s) Notes Reviewed:  neurosurgery, cardiology, neurology    Care Discussed with Consultants/Other Providers: Dr. Jeffries

## 2018-06-13 NOTE — CONSULT NOTE ADULT - ASSESSMENT
77 year old woman well known to me, long history of coronary disease, multiple percutaneous coronary interventions and ventricular function remains well preserved. Has never manifested congestive heart failure, has no significant valvular heart disease and stable rhythm. Her functional capacity has always been good except as limited by pain, spinal syndromes.    Coronary disease - can stop aspirin and clopidogrel for urgent neurosurgical procedure.    continue statin    Hypertension - continue irbesartan and amlodipine

## 2018-06-13 NOTE — PROGRESS NOTE ADULT - ASSESSMENT
77F c hx migraines, occipital neuralgia s/p many different injections, CAD s/p multiple stents (2007, 2013, 2017) on DAPT, HTN, HLD, DM2, multiple back surgeries c/b collapsed lung remotely, urinary incontinence, dysphagia, subclavian stenosis, presents with worsening headache transient vision loss, found to have vertebral atherosclerosis

## 2018-06-14 ENCOUNTER — APPOINTMENT (OUTPATIENT)
Dept: NEUROSURGERY | Facility: HOSPITAL | Age: 78
End: 2018-06-14

## 2018-06-14 LAB
ANION GAP SERPL CALC-SCNC: 13 MMOL/L — SIGNIFICANT CHANGE UP (ref 5–17)
ANION GAP SERPL CALC-SCNC: 13 MMOL/L — SIGNIFICANT CHANGE UP (ref 5–17)
BLD GP AB SCN SERPL QL: NEGATIVE — SIGNIFICANT CHANGE UP
BUN SERPL-MCNC: 14 MG/DL — SIGNIFICANT CHANGE UP (ref 7–23)
BUN SERPL-MCNC: 24 MG/DL — HIGH (ref 7–23)
CALCIUM SERPL-MCNC: 8.3 MG/DL — LOW (ref 8.4–10.5)
CALCIUM SERPL-MCNC: 8.9 MG/DL — SIGNIFICANT CHANGE UP (ref 8.4–10.5)
CHLORIDE SERPL-SCNC: 103 MMOL/L — SIGNIFICANT CHANGE UP (ref 96–108)
CHLORIDE SERPL-SCNC: 104 MMOL/L — SIGNIFICANT CHANGE UP (ref 96–108)
CO2 SERPL-SCNC: 24 MMOL/L — SIGNIFICANT CHANGE UP (ref 22–31)
CO2 SERPL-SCNC: 27 MMOL/L — SIGNIFICANT CHANGE UP (ref 22–31)
CREAT SERPL-MCNC: 0.63 MG/DL — SIGNIFICANT CHANGE UP (ref 0.5–1.3)
CREAT SERPL-MCNC: 0.73 MG/DL — SIGNIFICANT CHANGE UP (ref 0.5–1.3)
GLUCOSE BLDC GLUCOMTR-MCNC: 120 MG/DL — HIGH (ref 70–99)
GLUCOSE BLDC GLUCOMTR-MCNC: 146 MG/DL — HIGH (ref 70–99)
GLUCOSE SERPL-MCNC: 105 MG/DL — HIGH (ref 70–99)
GLUCOSE SERPL-MCNC: 146 MG/DL — HIGH (ref 70–99)
HCT VFR BLD CALC: 42.7 % — SIGNIFICANT CHANGE UP (ref 34.5–45)
HCT VFR BLD CALC: 43.2 % — SIGNIFICANT CHANGE UP (ref 34.5–45)
HGB BLD-MCNC: 13.8 G/DL — SIGNIFICANT CHANGE UP (ref 11.5–15.5)
HGB BLD-MCNC: 14.3 G/DL — SIGNIFICANT CHANGE UP (ref 11.5–15.5)
MAGNESIUM SERPL-MCNC: 1.9 MG/DL — SIGNIFICANT CHANGE UP (ref 1.6–2.6)
MAGNESIUM SERPL-MCNC: 2.1 MG/DL — SIGNIFICANT CHANGE UP (ref 1.6–2.6)
MCHC RBC-ENTMCNC: 29.6 PG — SIGNIFICANT CHANGE UP (ref 27–34)
MCHC RBC-ENTMCNC: 30.1 PG — SIGNIFICANT CHANGE UP (ref 27–34)
MCHC RBC-ENTMCNC: 32.3 GM/DL — SIGNIFICANT CHANGE UP (ref 32–36)
MCHC RBC-ENTMCNC: 33.1 GM/DL — SIGNIFICANT CHANGE UP (ref 32–36)
MCV RBC AUTO: 90.9 FL — SIGNIFICANT CHANGE UP (ref 80–100)
MCV RBC AUTO: 91.4 FL — SIGNIFICANT CHANGE UP (ref 80–100)
PHOSPHATE SERPL-MCNC: 4.3 MG/DL — SIGNIFICANT CHANGE UP (ref 2.5–4.5)
PHOSPHATE SERPL-MCNC: 4.3 MG/DL — SIGNIFICANT CHANGE UP (ref 2.5–4.5)
PLATELET # BLD AUTO: 150 K/UL — SIGNIFICANT CHANGE UP (ref 150–400)
PLATELET # BLD AUTO: 163 K/UL — SIGNIFICANT CHANGE UP (ref 150–400)
POTASSIUM SERPL-MCNC: 4.1 MMOL/L — SIGNIFICANT CHANGE UP (ref 3.5–5.3)
POTASSIUM SERPL-MCNC: 4.2 MMOL/L — SIGNIFICANT CHANGE UP (ref 3.5–5.3)
POTASSIUM SERPL-SCNC: 4.1 MMOL/L — SIGNIFICANT CHANGE UP (ref 3.5–5.3)
POTASSIUM SERPL-SCNC: 4.2 MMOL/L — SIGNIFICANT CHANGE UP (ref 3.5–5.3)
RBC # BLD: 4.67 M/UL — SIGNIFICANT CHANGE UP (ref 3.8–5.2)
RBC # BLD: 4.75 M/UL — SIGNIFICANT CHANGE UP (ref 3.8–5.2)
RBC # FLD: 11.9 % — SIGNIFICANT CHANGE UP (ref 10.3–14.5)
RBC # FLD: 13.6 % — SIGNIFICANT CHANGE UP (ref 10.3–14.5)
RH IG SCN BLD-IMP: POSITIVE — SIGNIFICANT CHANGE UP
SODIUM SERPL-SCNC: 141 MMOL/L — SIGNIFICANT CHANGE UP (ref 135–145)
SODIUM SERPL-SCNC: 143 MMOL/L — SIGNIFICANT CHANGE UP (ref 135–145)
WBC # BLD: 4.94 K/UL — SIGNIFICANT CHANGE UP (ref 3.8–10.5)
WBC # BLD: 7.4 K/UL — SIGNIFICANT CHANGE UP (ref 3.8–10.5)
WBC # FLD AUTO: 4.94 K/UL — SIGNIFICANT CHANGE UP (ref 3.8–10.5)
WBC # FLD AUTO: 7.4 K/UL — SIGNIFICANT CHANGE UP (ref 3.8–10.5)

## 2018-06-14 PROCEDURE — 99233 SBSQ HOSP IP/OBS HIGH 50: CPT

## 2018-06-14 PROCEDURE — 36223 PLACE CATH CAROTID/INOM ART: CPT | Mod: 59,RT

## 2018-06-14 PROCEDURE — 99291 CRITICAL CARE FIRST HOUR: CPT

## 2018-06-14 PROCEDURE — 36224 PLACE CATH CAROTD ART: CPT | Mod: RT

## 2018-06-14 PROCEDURE — 99232 SBSQ HOSP IP/OBS MODERATE 35: CPT | Mod: 25

## 2018-06-14 PROCEDURE — 36227 PLACE CATH XTRNL CAROTID: CPT | Mod: RT

## 2018-06-14 PROCEDURE — 36228 PLACE CATH INTRACRANIAL ART: CPT | Mod: LT

## 2018-06-14 PROCEDURE — 0075T PERQ STENT/CHEST VERT ART: CPT | Mod: 26,LT

## 2018-06-14 PROCEDURE — 36226 PLACE CATH VERTEBRAL ART: CPT | Mod: RT

## 2018-06-14 RX ORDER — ESTRADIOL 4 UG/1
1 INSERT VAGINAL
Qty: 0 | Refills: 0 | COMMUNITY

## 2018-06-14 RX ORDER — OMEPRAZOLE 10 MG/1
40 CAPSULE, DELAYED RELEASE ORAL DAILY
Qty: 0 | Refills: 0 | Status: DISCONTINUED | OUTPATIENT
Start: 2018-06-14 | End: 2018-06-16

## 2018-06-14 RX ORDER — FROVATRIPTAN SUCCINATE 2.5 MG/1
1 TABLET, FILM COATED ORAL
Qty: 0 | Refills: 0 | COMMUNITY

## 2018-06-14 RX ORDER — TRAMADOL HYDROCHLORIDE 50 MG/1
25 TABLET ORAL EVERY 4 HOURS
Qty: 0 | Refills: 0 | Status: DISCONTINUED | OUTPATIENT
Start: 2018-06-14 | End: 2018-06-15

## 2018-06-14 RX ORDER — TRAMADOL HYDROCHLORIDE 50 MG/1
1 TABLET ORAL
Qty: 0 | Refills: 0 | COMMUNITY

## 2018-06-14 RX ORDER — CALCIUM CARBONATE 500(1250)
1 TABLET ORAL EVERY 6 HOURS
Qty: 0 | Refills: 0 | Status: DISCONTINUED | OUTPATIENT
Start: 2018-06-14 | End: 2018-06-16

## 2018-06-14 RX ORDER — ASPIRIN/CALCIUM CARB/MAGNESIUM 324 MG
325 TABLET ORAL
Qty: 0 | Refills: 0 | Status: DISCONTINUED | OUTPATIENT
Start: 2018-06-14 | End: 2018-06-16

## 2018-06-14 RX ORDER — ACETAMINOPHEN 500 MG
1000 TABLET ORAL ONCE
Qty: 0 | Refills: 0 | Status: COMPLETED | OUTPATIENT
Start: 2018-06-14 | End: 2018-06-14

## 2018-06-14 RX ADMIN — Medication 30 MILLIGRAM(S): at 01:45

## 2018-06-14 RX ADMIN — Medication 81 MILLIGRAM(S): at 08:09

## 2018-06-14 RX ADMIN — OMEPRAZOLE 40 MILLIGRAM(S): 10 CAPSULE, DELAYED RELEASE ORAL at 08:02

## 2018-06-14 RX ADMIN — CLOPIDOGREL BISULFATE 75 MILLIGRAM(S): 75 TABLET, FILM COATED ORAL at 08:03

## 2018-06-14 RX ADMIN — IRBESARTAN AND HYDROCHLOROTHIAZIDE 1 TABLET(S): 12.5; 3 TABLET ORAL at 06:18

## 2018-06-14 RX ADMIN — Medication 1000 MILLIGRAM(S): at 21:58

## 2018-06-14 RX ADMIN — Medication 400 MILLIGRAM(S): at 21:43

## 2018-06-14 RX ADMIN — TRAMADOL HYDROCHLORIDE 25 MILLIGRAM(S): 50 TABLET ORAL at 19:40

## 2018-06-14 RX ADMIN — Medication 30 MILLIGRAM(S): at 01:15

## 2018-06-14 RX ADMIN — AMLODIPINE BESYLATE 10 MILLIGRAM(S): 2.5 TABLET ORAL at 06:17

## 2018-06-14 RX ADMIN — Medication 0.5 MILLIGRAM(S): at 09:03

## 2018-06-14 RX ADMIN — HEPARIN SODIUM 5000 UNIT(S): 5000 INJECTION INTRAVENOUS; SUBCUTANEOUS at 21:50

## 2018-06-14 RX ADMIN — TRAMADOL HYDROCHLORIDE 25 MILLIGRAM(S): 50 TABLET ORAL at 19:10

## 2018-06-14 NOTE — CHART NOTE - NSCHARTNOTEFT_GEN_A_CORE
Interventional Neuro Radiology  Pre-Procedure Note    This is a 78yo right hand dominant female with history of migraines, occipital neuralgia, presents with syncope and severe headache. Recent history of sudden onset severe headaches that have gotten progressively longer, initially lasting 10 minutes "like a shovel hit the back of my head", then subsiding into a duller pain that can last anywhere from hours to many days, associated with sudden onset bilateral painless complete vision loss lasting 10 seconds. Imaging demonstrated bilateral vertebral artery stenosis. Patient presents today to neuro IR for selective cerebral angiography and possible bilateral vertebral artery angioplasty/ stenting.     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 1000 milliLiter(s) IV Continuous <Continuous>  dextrose 50% Injectable 12.5 Gram(s) IV Push once  heparin  Injectable 5000 Unit(s) SubCutaneous every 8 hours  insulin lispro (HumaLOG) corrective regimen sliding scale   SubCutaneous three times a day before meals  insulin lispro (HumaLOG) corrective regimen sliding scale   SubCutaneous at bedtime  irbesartan 300 mG/hydrochlorothiazide 12.5 mG 1 Tablet(s) Oral daily  ketorolac   Injectable 30 milliGRAM(s) IV Push every 8 hours PRN  LORazepam     Tablet 0.5 milliGRAM(s) Oral at bedtime  omeprazole 40 milliGRAM(s) Oral daily  rosuvastatin 20 milliGRAM(s) Oral at bedtime      Labs:                         13.8   4.94  )-----------( 163      ( 14 Jun 2018 07:56 )             42.7       06-14    143  |  103  |  24<H>  ----------------------------<  105<H>  4.1   |  27  |  0.73    Ca    8.9      14 Jun 2018 06:17  Phos  4.3     06-14  Mg     2.1     06-14    TPro  7.4  /  Alb  4.3  /  TBili  0.5  /  DBili  x   /  AST  20  /  ALT  17  /  AlkPhos  103  06-11    Blood Bank: 06-14-18  A  --  Positive      Assessment/Plan:   This is a 78yo right hand dominant Female  presents with bilateral vertebral artery stenosis. Patient presents to neuro-IR for selective cerebral angiography, possible stenting/angioplasty. Procedure/ risks/ benefits/ goals/ alternatives were explained. Risks include but are not limited to stroke/ vessel injury/ hemorrhage/ groin hematoma. All questions answered. Informed content obtained from patient, family at bedside. Consent placed in chart.    Gisele Magallon PA-C  [phone]

## 2018-06-14 NOTE — PROGRESS NOTE ADULT - ASSESSMENT
s/p L vertebral stenting for vertebral stenosis    NEURO:  q1h neuro checks, analgesia    CARDS:  -150, home meds -- asa/plavix, almodipine, statin    PULM:  sat > 92%    RENAL:  IVL    GASTRO:  advance as tolerated  ---> Stress ulcer prophylaxis:  not indicated, on home PPI    HEME:  monitor H/H    ---> DVT prophylaxis: SCDs, heparin sq    ENDO:  euglycemia    ID:  afebrile    Code status:  Full code  Disposition:  ICU s/p L vertebral stenting for vertebral stenosis    NEURO:  q1h neuro checks, analgesia    CARDS:  -150, home meds -- asa/plavix, almodipine, statin    PULM:  sat > 92%    RENAL:  IVL    GASTRO:  advance as tolerated  ---> Stress ulcer prophylaxis:  not indicated, on home PPI    HEME:  monitor H/H    ---> DVT prophylaxis: SCDs, heparin sq    ENDO:  euglycemia    ID:  afebrile    Code status:  Full code  Disposition:  ICU   Critical care time 35 min for vertebral artery stenosis  Pt is at risk for neurological deterioration due to post op stroke / hemorrhage

## 2018-06-14 NOTE — CHART NOTE - NSCHARTNOTEFT_GEN_A_CORE
Interventional Neuro- Radiology   Procedure Note      Procedure: Selective Cerebral Angiography   Pre- Procedure Diagnosis: Bilateral vertebral artery stenosis   Post- Procedure Diagnosis:    : Dr. Tyler MD, Dr. Matthew MD  Fellow: Dr. Tiffanie Briones. MD  Resident: Dr. Hiram Ferreira MD  Physician Assistant: Gisele Magallon PA-C, Etta Vasquez PA-C    RN: Nancy     Anesthesia: Dr. Reddy MD (general anesthesia)    Sheath: 5 Fr arrow     I/Os:  Fluids:  Horowitz:  Contrast:  Estimated Blood Loss: <10cc    Vitals: BP=  136/76         HR=   60       SpO2= 95 %    Preliminary Report:  Under general anesthesia, using a 5Fr arrow sheath to the right groin examination of left vertebral artery/ left internal carotid artery/ left external carotid artery/ right vertebral artery/ right internal carotid artery/ right external carotid artery via selective cerebral angiography demonstrates ________. ( Official note to follow).    Patient tolerated procedure well, vital signs stable, hemodynamically stable, no change in neurological status compared to baseline. Results discussed with neurosurgery/ patient and their family. Groin sheath d/c'ed, manual compression held to hemostasis, no active bleeding, no hematoma, star-close device applied, quick clot and safeguard balloon dressing applied at _____h. Patient transferred to NSCU for further care/ monitoring. Interventional Neuro- Radiology   Procedure Note      Procedure: Selective Cerebral Angiography and Left Vertebral Artery Stenting   Pre- Procedure Diagnosis: Bilateral vertebral artery stenosis   Post- Procedure Diagnosis: Left vertebral artery angioplasty and stenting     : Dr. Tyler MD, Dr. Matthew MD  Fellow: Dr. Tiffanie Briones. MD  Resident: Dr. Hiram Ferreira MD  Physician Assistant: Gisele Magallon PA-C, Etta Vasquez PA-C    RN: Nancy     Anesthesia: Dr. Reddy MD (general anesthesia)    Sheath: 5 Fr arrow     I/Os:  Fluids: 600cc   Horowitz: 700cc   Contrast: 125cc   Estimated Blood Loss: <10cc    Heparin= 4000u IVP  Nicardipine= 3mg Left Vertebral Artery    ACTS=  143, 140  292, 255  266, 281  287, 230  269, 250    Vitals: BP=  136/76         HR=   60       SpO2= 95 %    Preliminary Report:  Under general anesthesia, using a 5Fr arrow sheath to the right groin examination of left vertebral artery/ left internal carotid artery/ left external carotid artery/ right vertebral artery/ right internal carotid artery/ right external carotid artery via selective cerebral angiography demonstrates left vertebral artery stenosis, s/p stent x2 (3.5x12mm and 3.5x8mm) and angioplasty . ( Official note to follow).    Patient tolerated procedure well, vital signs stable, hemodynamically stable, no change in neurological status compared to baseline. Results discussed with neurosurgery/ patient and their family. Groin sheath d/c'ed, manual compression held to hemostasis, no active bleeding, no hematoma, star-close device applied, quick clot and safeguard balloon dressing applied at 14:00h. Patient transferred to NSCU for further care/ monitoring.    Gisele Magallon PA-C  [phone]

## 2018-06-14 NOTE — PROGRESS NOTE ADULT - SUBJECTIVE AND OBJECTIVE BOX
Patient is a 77y old  Female who presents with a chief complaint of severe headache and syncope (12 Jun 2018 06:27)      SUBJECTIVE / OVERNIGHT EVENTS:  no acute events overnight. vss, afebrile.  Patietn for stent today to vert. a.  Anxious.        MEDICATIONS  (STANDING):  amLODIPine   Tablet 10 milliGRAM(s) Oral daily  aspirin enteric coated 81 milliGRAM(s) Oral daily  clopidogrel Tablet 75 milliGRAM(s) Oral daily  dextrose 5%. 1000 milliLiter(s) (50 mL/Hr) IV Continuous <Continuous>  dextrose 50% Injectable 12.5 Gram(s) IV Push once  heparin  Injectable 5000 Unit(s) SubCutaneous every 8 hours  insulin lispro (HumaLOG) corrective regimen sliding scale   SubCutaneous three times a day before meals  insulin lispro (HumaLOG) corrective regimen sliding scale   SubCutaneous at bedtime  irbesartan 300 mG/hydrochlorothiazide 12.5 mG 1 Tablet(s) Oral daily  omeprazole 40 milliGRAM(s) Oral daily  rosuvastatin 20 milliGRAM(s) Oral at bedtime    MEDICATIONS  (PRN):  dextrose 40% Gel 15 Gram(s) Oral once PRN Blood Glucose LESS THAN 70 milliGRAM(s)/deciliter  ketorolac   Injectable 30 milliGRAM(s) IV Push every 8 hours PRN Severe Pain (7 - 10)    CAPILLARY BLOOD GLUCOSE      POCT Blood Glucose.: 120 mg/dL (14 Jun 2018 07:31)  POCT Blood Glucose.: 129 mg/dL (13 Jun 2018 21:18)  POCT Blood Glucose.: 106 mg/dL (13 Jun 2018 17:19)    I&O's Summary    12 Jun 2018 07:01  -  13 Jun 2018 07:00  --------------------------------------------------------  IN: 1070 mL / OUT: 0 mL / NET: 1070 mL    13 Jun 2018 07:01  -  13 Jun 2018 13:38  --------------------------------------------------------  IN: 240 mL / OUT: 0 mL / NET: 240 mL        PHYSICAL EXAM:  Vital Signs Last 24 Hrs  T(C): 36.4 (14 Jun 2018 04:39), Max: 36.6 (13 Jun 2018 21:06)  T(F): 97.5 (14 Jun 2018 04:39), Max: 97.9 (13 Jun 2018 21:06)  HR: 65 (14 Jun 2018 04:39) (65 - 73)  BP: 170/80 (14 Jun 2018 04:39) (132/77 - 170/80)  BP(mean): --  RR: 18 (14 Jun 2018 04:39) (16 - 18)  SpO2: 98% (14 Jun 2018 04:39) (98% - 99%)    GENERAL: NAD, well-developed  HEAD:  Atraumatic, Normocephalic  EYES: EOMI, PERRLA, conjunctiva and sclera clear  NECK: Supple, No JVD  CHEST/LUNG: Clear to auscultation bilaterally; No wheeze  HEART: Regular rate and rhythm; No murmurs, rubs, or gallops  ABDOMEN: Soft, Nontender, Nondistended; Bowel sounds present  EXTREMITIES:  2+ Peripheral Pulses, No clubbing, cyanosis, or edema  NEUROLOGY: AAOx3; non-focal  SKIN: No rashes or lesions    LABS:  personally reivewed                                      13.8   4.94  )-----------( 163      ( 14 Jun 2018 07:56 )             42.7   06-14    143  |  103  |  24<H>  ----------------------------<  105<H>  4.1   |  27  |  0.73    Ca    8.9      14 Jun 2018 06:17  Phos  4.3     06-14  Mg     2.1     06-14          RADIOLOGY & ADDITIONAL TESTS:    Imaging Personally Reviewed:  < from: MR Angio Head No Cont (06.13.18 @ 15:20) >    Impression: Noninvasive vascular imaging demonstrating mild right   vertebral artery stenosis and moderate to severe left vertebral artery   stenosis.      < end of copied text >    Consultant(s) Notes Reviewed:  neurosurgery, cardiology, neurology    Care Discussed with Consultants/Other Providers: Dr. Jeffries

## 2018-06-14 NOTE — PROGRESS NOTE ADULT - PROBLEM SELECTOR PLAN 2
neuro and nsg consults appreciated. s/p toradol with improvement in the headache.   - unclear etiology, but hx/exam most likely consistent with occipital neuralgia. ddx includes poss basilar artery migraines  - continue toradol 30mg IV q8 hrs as abortive therapy.   - f/u with neurologist for continued nerve block/botox/other treatments  - f/u with outpt neurologist regarding results of tests and any other further recommendations  -would hold off triptans at this time given b/l stenosis, history of cardiac disease

## 2018-06-14 NOTE — PROGRESS NOTE ADULT - SUBJECTIVE AND OBJECTIVE BOX
HPI:  Awake, coherent and without complaint, specifically no chest discomfort or dyspnea early after Neurovascular procedure.    Medications:  amLODIPine   Tablet 10 milliGRAM(s) Oral daily  aspirin enteric coated 325 milliGRAM(s) Oral <User Schedule>  clopidogrel Tablet 75 milliGRAM(s) Oral daily  dextrose 40% Gel 15 Gram(s) Oral once PRN  dextrose 5%. 1000 milliLiter(s) IV Continuous <Continuous>  dextrose 50% Injectable 12.5 Gram(s) IV Push once  heparin  Injectable 5000 Unit(s) SubCutaneous every 8 hours  insulin lispro (HumaLOG) corrective regimen sliding scale   SubCutaneous three times a day before meals  insulin lispro (HumaLOG) corrective regimen sliding scale   SubCutaneous at bedtime  irbesartan 300 mG/hydrochlorothiazide 12.5 mG 1 Tablet(s) Oral daily  ketorolac   Injectable 30 milliGRAM(s) IV Push every 8 hours PRN  LORazepam     Tablet 0.5 milliGRAM(s) Oral at bedtime  omeprazole 40 milliGRAM(s) Oral daily  rosuvastatin 20 milliGRAM(s) Oral at bedtime    PMH/PSH/FH/SH:  Unchanged    ROS:  General: no fatigue/malaise, weight loss/gain.  Skin: no rashes.  Ophthalmologic: see HPI. 	  ENMT: no sore throat, rhinorrhea, sinus congestion.  Respiratory: no SOB, cough or wheeze.  Cardiovascular: no chest pain, dyspnea, palpitations, orthopnea or paroxysmal nocturnal dyspnea. No claudication.  Gastrointestinal:  no N/V/D, no melena/hematemesis/hematochezia.  Genitourinary: no dysuria/hesitancy or hematuria.  Musculoskeletal: no myalgias or arthralgias.  Neurological: no changes in vision or hearing, no lightheadedness/dizziness, no syncope/near syncope	  Psychiatric: appropriate.   Hematology/Lymphatics: no unusual bleeding, bruising and no lymphadenopathy.  Endocrine: no unusual thirst.   All others negative except as stated above and in HPI.     Vitals:  T(C): 36.2 (18 @ 14:15), Max: 36.6 (18 @ 21:06)  HR: 57 (18 @ 14:45) (57 - 73)  BP: 170/80 (18 @ 04:39) (132/77 - 170/80)  BP(mean): --  RR: 18 (18 @ 04:39) (16 - 18)  SpO2: 98% (18 @ 04:39) (98% - 99%)  Wt(kg): --  Daily     Daily Weight in k.5 (2018 06:14)    Physical exam:  Appearance: anxious, tearful after worrisome surgical news; generally well appearing, able to lie flat, breathing comfortably on RA  Eyes: PERRL, EOMI, pink conjunctiva  HENT: Normal oral mucosa  Respiratory: normal percussion without RR or W  Cardiovascular: apex non-displaced, RRR, S1 normal, S2 physiologic split, no murmurs, rubs, or gallops; no edema; no JVD  Gastrointestinal: soft, non-tender, non-distended with normal bowel sounds  Musculoskeletal: No clubbing; no joint deformity  Vascular: pulses symmetric and equal to DP/PT  Lymphatic: No lymphadenopathy  Skin: No rashes, stable ecchymoses, no cyanosis  Neurologic: Non-focal  Psychiatry: AAOx3, mood & affect appropriate.     I&O's Summary    2018 07:01  -  2018 07:00  --------------------------------------------------------  IN: 590 mL / OUT: 0 mL / NET: 590 mL    2018 07:01  -  2018 15:03  --------------------------------------------------------  IN: 0 mL / OUT: 175 mL / NET: -175 mL                              13.8   4.94  )-----------( 163      ( 2018 07:56 )             42.7     06-14    143  |  103  |  24<H>  ----------------------------<  105<H>  4.1   |  27  |  0.73    Ca    8.9      2018 06:17  Phos  4.3     06-14  Mg     2.1     06-14            Interpretation of Telemetry: sinus rhythm

## 2018-06-14 NOTE — PROGRESS NOTE ADULT - ASSESSMENT
77 year old woman well known to me, long history of coronary disease, multiple percutaneous coronary interventions and ventricular function remains well preserved. Has never manifested congestive heart failure, has no significant valvular heart disease. Has now had successful left vertebral artery stent placement.    Coronary disease - continue aspirin and clopidogrel.    continue statin    Hypertension - continue irbesartan and amlodipine

## 2018-06-14 NOTE — PROGRESS NOTE ADULT - SUBJECTIVE AND OBJECTIVE BOX
HPI:  77F c hx migraines, occipital neuralgia s/p many different injections, CAD s/p multiple stents (2007, 2013, 2017) on DAPT, HTN, HLD, DM2, multiple back surgeries c/b collapsed lung remotely, urinary incontinence, dysphagia, subclavian stenosis, presents with syncope and severe headache.  Found to have L vertebral stenosis, underwent endovascular stent placement.    VS: Tm 98.2, P 57, /64, R 19, 98% RA (12 Jun 2018 06:27)    OVERNIGHT EVENTS:   No acute events overnight.    VITALS:  T(C): , Max: 36.6 (06-13-18 @ 21:06)  HR:  (49 - 73)  BP:  (164/83 - 170/80)  ABP:  (120/44 - 138/49)  RR:  (10 - 18)  SpO2:  (96% - 99%)  Wt(kg): --      LABS:  Na: 143 (06-14 @ 06:17), 141 (06-13 @ 03:04), 141 (06-12 @ 08:26), 141 (06-11 @ 19:05)  K: 4.1 (06-14 @ 06:17), 4.1 (06-13 @ 03:04), 4.0 (06-12 @ 08:26), 4.1 (06-11 @ 19:05)  Cl: 103 (06-14 @ 06:17), 104 (06-13 @ 03:04), 100 (06-12 @ 08:26), 102 (06-11 @ 19:05)  CO2: 27 (06-14 @ 06:17), 24 (06-13 @ 03:04), 28 (06-12 @ 08:26), 25 (06-11 @ 19:05)  BUN: 24 (06-14 @ 06:17), 29 (06-13 @ 03:04), 17 (06-12 @ 08:26), 19 (06-11 @ 19:05)  Cr: 0.73 (06-14 @ 06:17), 0.86 (06-13 @ 03:04), 0.73 (06-12 @ 08:26), 0.80 (06-11 @ 19:05)  Glu:     Hgb: 13.8 (06-14 @ 07:56), 13.8 (06-13 @ 03:04), 16.3 (06-12 @ 08:26), 15.0 (06-11 @ 19:06)  Hct: 42.7 (06-14 @ 07:56), 41.5 (06-13 @ 03:04), 49.6 (06-12 @ 08:26), 45.3 (06-11 @ 19:06)  WBC: 4.94 (06-14 @ 07:56), 5.4 (06-13 @ 03:04), 5.6 (06-12 @ 08:26), 7.0 (06-11 @ 19:06)  Plt: 163 (06-14 @ 07:56), 148 (06-13 @ 03:04), 171 (06-12 @ 08:26), 173 (06-11 @ 19:06)      IMAGING:   Recent imaging studies were reviewed.    MEDICATIONS:  amLODIPine   Tablet 10 milliGRAM(s) Oral daily  aspirin enteric coated 325 milliGRAM(s) Oral <User Schedule>  clopidogrel Tablet 75 milliGRAM(s) Oral daily  dextrose 40% Gel 15 Gram(s) Oral once PRN  dextrose 5%. 1000 milliLiter(s) IV Continuous <Continuous>  dextrose 50% Injectable 12.5 Gram(s) IV Push once  heparin  Injectable 5000 Unit(s) SubCutaneous every 8 hours  insulin lispro (HumaLOG) corrective regimen sliding scale   SubCutaneous three times a day before meals  insulin lispro (HumaLOG) corrective regimen sliding scale   SubCutaneous at bedtime  irbesartan 300 mG/hydrochlorothiazide 12.5 mG 1 Tablet(s) Oral daily  ketorolac   Injectable 30 milliGRAM(s) IV Push every 8 hours PRN  omeprazole 40 milliGRAM(s) Oral daily  rosuvastatin 20 milliGRAM(s) Oral at bedtime    EXAMINATION:  General:  calm  HEENT:  MMM  Neuro:  awake, alert, oriented x 3, follows commands, moves all extremities  Cards:  RRR  Respiratory:  no respiratory distress  Adomen:  soft  Extremities:  moves all extremities  Skin:  warm/dry

## 2018-06-15 LAB
GLUCOSE BLDC GLUCOMTR-MCNC: 108 MG/DL — HIGH (ref 70–99)
GLUCOSE BLDC GLUCOMTR-MCNC: 110 MG/DL — HIGH (ref 70–99)
GLUCOSE BLDC GLUCOMTR-MCNC: 134 MG/DL — HIGH (ref 70–99)
GLUCOSE BLDC GLUCOMTR-MCNC: 167 MG/DL — HIGH (ref 70–99)

## 2018-06-15 PROCEDURE — 99232 SBSQ HOSP IP/OBS MODERATE 35: CPT

## 2018-06-15 PROCEDURE — 99233 SBSQ HOSP IP/OBS HIGH 50: CPT

## 2018-06-15 PROCEDURE — 70544 MR ANGIOGRAPHY HEAD W/O DYE: CPT | Mod: 26

## 2018-06-15 PROCEDURE — 71045 X-RAY EXAM CHEST 1 VIEW: CPT | Mod: 26

## 2018-06-15 RX ORDER — MAGNESIUM SULFATE 500 MG/ML
1 VIAL (ML) INJECTION ONCE
Qty: 0 | Refills: 0 | Status: COMPLETED | OUTPATIENT
Start: 2018-06-15 | End: 2018-06-15

## 2018-06-15 RX ORDER — TRAMADOL HYDROCHLORIDE 50 MG/1
50 TABLET ORAL EVERY 6 HOURS
Qty: 0 | Refills: 0 | Status: DISCONTINUED | OUTPATIENT
Start: 2018-06-15 | End: 2018-06-16

## 2018-06-15 RX ORDER — ACETAMINOPHEN 500 MG
975 TABLET ORAL EVERY 8 HOURS
Qty: 0 | Refills: 0 | Status: DISCONTINUED | OUTPATIENT
Start: 2018-06-15 | End: 2018-06-16

## 2018-06-15 RX ORDER — ONDANSETRON 8 MG/1
4 TABLET, FILM COATED ORAL ONCE
Qty: 0 | Refills: 0 | Status: COMPLETED | OUTPATIENT
Start: 2018-06-15 | End: 2018-06-15

## 2018-06-15 RX ADMIN — HEPARIN SODIUM 5000 UNIT(S): 5000 INJECTION INTRAVENOUS; SUBCUTANEOUS at 21:22

## 2018-06-15 RX ADMIN — ROSUVASTATIN CALCIUM 20 MILLIGRAM(S): 5 TABLET ORAL at 00:19

## 2018-06-15 RX ADMIN — TRAMADOL HYDROCHLORIDE 50 MILLIGRAM(S): 50 TABLET ORAL at 10:46

## 2018-06-15 RX ADMIN — Medication 1: at 18:38

## 2018-06-15 RX ADMIN — TRAMADOL HYDROCHLORIDE 25 MILLIGRAM(S): 50 TABLET ORAL at 01:21

## 2018-06-15 RX ADMIN — Medication 975 MILLIGRAM(S): at 17:06

## 2018-06-15 RX ADMIN — ONDANSETRON 4 MILLIGRAM(S): 8 TABLET, FILM COATED ORAL at 18:37

## 2018-06-15 RX ADMIN — TRAMADOL HYDROCHLORIDE 25 MILLIGRAM(S): 50 TABLET ORAL at 00:51

## 2018-06-15 RX ADMIN — CLOPIDOGREL BISULFATE 75 MILLIGRAM(S): 75 TABLET, FILM COATED ORAL at 11:47

## 2018-06-15 RX ADMIN — TRAMADOL HYDROCHLORIDE 50 MILLIGRAM(S): 50 TABLET ORAL at 10:16

## 2018-06-15 RX ADMIN — Medication 100 GRAM(S): at 11:49

## 2018-06-15 RX ADMIN — TRAMADOL HYDROCHLORIDE 25 MILLIGRAM(S): 50 TABLET ORAL at 05:34

## 2018-06-15 RX ADMIN — ROSUVASTATIN CALCIUM 20 MILLIGRAM(S): 5 TABLET ORAL at 21:21

## 2018-06-15 RX ADMIN — AMLODIPINE BESYLATE 10 MILLIGRAM(S): 2.5 TABLET ORAL at 05:54

## 2018-06-15 RX ADMIN — HEPARIN SODIUM 5000 UNIT(S): 5000 INJECTION INTRAVENOUS; SUBCUTANEOUS at 05:54

## 2018-06-15 RX ADMIN — TRAMADOL HYDROCHLORIDE 25 MILLIGRAM(S): 50 TABLET ORAL at 06:03

## 2018-06-15 RX ADMIN — IRBESARTAN AND HYDROCHLOROTHIAZIDE 1 TABLET(S): 12.5; 3 TABLET ORAL at 05:55

## 2018-06-15 RX ADMIN — OMEPRAZOLE 40 MILLIGRAM(S): 10 CAPSULE, DELAYED RELEASE ORAL at 06:04

## 2018-06-15 RX ADMIN — Medication 325 MILLIGRAM(S): at 05:54

## 2018-06-15 NOTE — PROGRESS NOTE ADULT - ASSESSMENT
77F s/p angioplasty and stent placement of left vertebral artery       -ASA, Plavix  -SQH  -Transfer to floor in AM

## 2018-06-15 NOTE — PROGRESS NOTE ADULT - ASSESSMENT
77 year old woman well known to me, long history of coronary disease, multiple percutaneous coronary interventions and ventricular function remains well preserved. Has never manifested congestive heart failure, has no significant valvular heart disease. Has now had successful left vertebral artery stent placement.    Coronary disease - continue aspirin and clopidogrel.    continue statin    Hypertension - continue irbesartan and amlodipine    Cerobrovascular disease -    management per Neurosurgery

## 2018-06-15 NOTE — PROGRESS NOTE ADULT - ASSESSMENT
77F c migraines, occipital neuralgia s/p many different injections, CAD s/p multiple stents (2007, 2013, 2017) on DAPT, HTN, HLD, DM2, multiple back surgeries c/b collapsed lung remotely, urinary incontinence, dysphagia, subclavian stenosis, presents with worsening headache transient vision loss, found to have vertebral atherosclerosis

## 2018-06-15 NOTE — PROGRESS NOTE ADULT - SUBJECTIVE AND OBJECTIVE BOX
O:77F s/p angioplasty and stent placement of left vertebral artery     T(C): 36.6 (06-15-18 @ 07:00), Max: 36.7 (06-14-18 @ 19:00)  HR: 55 (06-15-18 @ 10:00) (49 - 69)  BP: --  RR: 11 (06-15-18 @ 10:00) (10 - 25)  SpO2: 90% (06-15-18 @ 10:00) (90% - 99%)  06-14-18 @ 07:01  -  06-15-18 @ 07:00  --------------------------------------------------------  IN: 400 mL / OUT: 1785 mL / NET: -1385 mL    06-15-18 @ 07:01  -  06-15-18 @ 10:20  --------------------------------------------------------  IN: 0 mL / OUT: 200 mL / NET: -200 mL    amLODIPine   Tablet 10 milliGRAM(s) Oral daily  aspirin enteric coated 325 milliGRAM(s) Oral <User Schedule>  calcium carbonate 500 mG (Tums) Chewable 1 Tablet(s) Chew every 6 hours PRN  clopidogrel Tablet 75 milliGRAM(s) Oral daily  dextrose 40% Gel 15 Gram(s) Oral once PRN  dextrose 5%. 1000 milliLiter(s) IV Continuous <Continuous>  dextrose 50% Injectable 12.5 Gram(s) IV Push once  heparin  Injectable 5000 Unit(s) SubCutaneous every 8 hours  insulin lispro (HumaLOG) corrective regimen sliding scale   SubCutaneous three times a day before meals  insulin lispro (HumaLOG) corrective regimen sliding scale   SubCutaneous at bedtime  irbesartan 300 mG/hydrochlorothiazide 12.5 mG 1 Tablet(s) Oral daily  ketorolac   Injectable 30 milliGRAM(s) IV Push every 8 hours PRN  omeprazole 40 milliGRAM(s) Oral daily  rosuvastatin 20 milliGRAM(s) Oral at bedtime  traMADol 50 milliGRAM(s) Oral every 6 hours PRN        EXAMINATION:  General:  calm  HEENT:  MMM  Neuro:  awake, alert, oriented x 3, follows commands, moves all extremities  Cards:  RRR  Respiratory:  no respiratory distress  Adomen:  soft  Extremities:  moves all extremities  Skin:  warm/dry      Assessment and Plan:   · Assessment	  s/p L vertebral stenting for vertebral stenosis    NEURO:  q1h neuro checks, analgesia    CARDS:  -150, home meds -- asa/plavix, almodipine, statin    PULM:  sat > 92%    RENAL:  IVL    GASTRO:  advance as tolerated  ---> Stress ulcer prophylaxis:  not indicated, on home PPI    HEME:  monitor H/H    ---> DVT prophylaxis: SCDs, heparin sq    ENDO:  euglycemia    ID:  afebrile    Code status:  Full code  Disposition:  ICU   Critical care time 35 min for vertebral artery stenosis  Pt is at risk for neurological deterioration due to post op stroke / hemorrhage O:77F s/p angioplasty and stent placement of left vertebral artery       PAST MEDICAL & SURGICAL HISTORY:  Family history of coronary artery disease  Hypercholesteremia  Hypertension  MI (myocardial infarction): 2007  CAD (coronary artery disease): s/p stents x2-2007  S/P lumpectomy of breast: x5-benign tumors  S/P cholecystectomy  H/O vaginal surgery: robotic-Nov 2012  H/O fall: 2.5 years ago multiple upper and lower injuries  Rib deformity: born with extra ribs, 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;[id number]        REVIEW OF SYSTEMS: [ ] Unable to Assess due to neurologic exam   [ x] All ROS addressed below are non-contributory, except:  Neuro: [ ] Headache [ ] Back pain [ ] Numbness [ ] Weakness [ ] Ataxia [ ] Dizziness [ ] Aphasia [ ] Dysarthria [ ] Visual disturbance  Resp: [ ] Shortness of breath/dyspnea, [ ] Orthopnea [ ] Cough  CV: [ ] Chest pain [ ] Palpitation [ ] Lightheadedness [ ] Syncope  Renal: [ ] Thirst [ ] Edema  GI: [ ] Nausea [ ] Emesis [ ] Abdominal pain [ ] Constipation [ ] Diarrhea  Hem: [ ] Hematemesis [ ] bright red blood per rectum  ID: [ ] Fever [ ] Chills [ ] Dysuria  ENT: [ ] Rhinorrhea          T(C): 36.6 (06-15-18 @ 07:00), Max: 36.7 (06-14-18 @ 19:00)  HR: 55 (06-15-18 @ 10:00) (49 - 69)  BP: --  RR: 11 (06-15-18 @ 10:00) (10 - 25)  SpO2: 90% (06-15-18 @ 10:00) (90% - 99%)  06-14-18 @ 07:01  -  06-15-18 @ 07:00  --------------------------------------------------------  IN: 400 mL / OUT: 1785 mL / NET: -1385 mL    06-15-18 @ 07:01  -  06-15-18 @ 10:20  --------------------------------------------------------  IN: 0 mL / OUT: 200 mL / NET: -200 mL    amLODIPine   Tablet 10 milliGRAM(s) Oral daily  aspirin enteric coated 325 milliGRAM(s) Oral <User Schedule>  calcium carbonate 500 mG (Tums) Chewable 1 Tablet(s) Chew every 6 hours PRN  clopidogrel Tablet 75 milliGRAM(s) Oral daily  dextrose 40% Gel 15 Gram(s) Oral once PRN  dextrose 5%. 1000 milliLiter(s) IV Continuous <Continuous>  dextrose 50% Injectable 12.5 Gram(s) IV Push once  heparin  Injectable 5000 Unit(s) SubCutaneous every 8 hours  insulin lispro (HumaLOG) corrective regimen sliding scale   SubCutaneous three times a day before meals  insulin lispro (HumaLOG) corrective regimen sliding scale   SubCutaneous at bedtime  irbesartan 300 mG/hydrochlorothiazide 12.5 mG 1 Tablet(s) Oral daily  ketorolac   Injectable 30 milliGRAM(s) IV Push every 8 hours PRN  omeprazole 40 milliGRAM(s) Oral daily  rosuvastatin 20 milliGRAM(s) Oral at bedtime  traMADol 50 milliGRAM(s) Oral every 6 hours PRN        EXAMINATION:  General:  calm  HEENT:  MMM  Neuro:  awake, alert, oriented x 3, KETURAH, follows commands, moves all extremities  Cards:  RRR  Respiratory:  no respiratory distress  Adomen:  soft  Extremities:  moves all extremities  Skin:  warm/dry      Assessment and Plan:   · Assessment	  s/p L vertebral stenting for vertebral stenosis    NEURO:  q4h neuro checks, analgesia, aspirin and plavix for stent, MRI anova per Dr Camacho     CARDS:  -150, home meds -- asa/plavix, almodipine, statin     PULM:  sat > 92%    RENAL:  IVL    GASTRO:  advance as tolerated  ---> Stress ulcer prophylaxis:  not indicated, on home PPI    HEME:  monitor H/H    ---> DVT prophylaxis: SCDs, heparin sq    ENDO:  euglycemia    ID:  afebrile    Code status:  Full code  Disposition:  floor O:77F s/p angioplasty and stent placement of left vertebral artery       PAST MEDICAL & SURGICAL HISTORY:  Family history of coronary artery disease  Hypercholesteremia  Hypertension  MI (myocardial infarction): 2007  CAD (coronary artery disease): s/p stents x2-2007  S/P lumpectomy of breast: x5-benign tumors  S/P cholecystectomy  H/O vaginal surgery: robotic-Nov 2012  H/O fall: 2.5 years ago multiple upper and lower injuries  Rib deformity: born with extra ribs, 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;s        REVIEW OF SYSTEMS: [ ] Unable to Assess due to neurologic exam   [ x] All ROS addressed below are non-contributory, except:  Neuro: [ ] Headache [ ] Back pain [ ] Numbness [ ] Weakness [ ] Ataxia [ ] Dizziness [ ] Aphasia [ ] Dysarthria [ ] Visual disturbance  Resp: [ ] Shortness of breath/dyspnea, [ ] Orthopnea [ ] Cough  CV: [ ] Chest pain [ ] Palpitation [ ] Lightheadedness [ ] Syncope  Renal: [ ] Thirst [ ] Edema  GI: [ ] Nausea [ ] Emesis [ ] Abdominal pain [ ] Constipation [ ] Diarrhea  Hem: [ ] Hematemesis [ ] bright red blood per rectum  ID: [ ] Fever [ ] Chills [ ] Dysuria  ENT: [ ] Rhinorrhea          T(C): 36.6 (06-15-18 @ 07:00), Max: 36.7 (06-14-18 @ 19:00)  HR: 55 (06-15-18 @ 10:00) (49 - 69)  BP: --  RR: 11 (06-15-18 @ 10:00) (10 - 25)  SpO2: 90% (06-15-18 @ 10:00) (90% - 99%)  06-14-18 @ 07:01  -  06-15-18 @ 07:00  --------------------------------------------------------  IN: 400 mL / OUT: 1785 mL / NET: -1385 mL    06-15-18 @ 07:01  -  06-15-18 @ 10:20  --------------------------------------------------------  IN: 0 mL / OUT: 200 mL / NET: -200 mL    amLODIPine   Tablet 10 milliGRAM(s) Oral daily  aspirin enteric coated 325 milliGRAM(s) Oral <User Schedule>  calcium carbonate 500 mG (Tums) Chewable 1 Tablet(s) Chew every 6 hours PRN  clopidogrel Tablet 75 milliGRAM(s) Oral daily  dextrose 40% Gel 15 Gram(s) Oral once PRN  dextrose 5%. 1000 milliLiter(s) IV Continuous <Continuous>  dextrose 50% Injectable 12.5 Gram(s) IV Push once  heparin  Injectable 5000 Unit(s) SubCutaneous every 8 hours  insulin lispro (HumaLOG) corrective regimen sliding scale   SubCutaneous three times a day before meals  insulin lispro (HumaLOG) corrective regimen sliding scale   SubCutaneous at bedtime  irbesartan 300 mG/hydrochlorothiazide 12.5 mG 1 Tablet(s) Oral daily  ketorolac   Injectable 30 milliGRAM(s) IV Push every 8 hours PRN  omeprazole 40 milliGRAM(s) Oral daily  rosuvastatin 20 milliGRAM(s) Oral at bedtime  traMADol 50 milliGRAM(s) Oral every 6 hours PRN        EXAMINATION:  General:  calm  HEENT:  MMM  Neuro:  awake, alert, oriented x 3, KETURAH, follows commands, moves all extremities  Cards:  RRR  Respiratory:  no respiratory distress  Adomen:  soft  Extremities:  moves all extremities  Skin:  warm/dry      Assessment and Plan:   · Assessment	  s/p L vertebral stenting for vertebral stenosis    NEURO:  q4h neuro checks, analgesia, aspirin and plavix for stent, MRI anova per Dr Camacho, d/c toradol, PT/OT, OBC     CARDS:  -150, home meds -- asa/plavix, almodipine, statin     PULM:  sat > 92%    RENAL:  IVL    GASTRO:  advance as tolerated  ---> Stress ulcer prophylaxis:  omeprazole    HEME:  monitor H/H    ---> DVT prophylaxis: SCDs, heparin sq    ENDO:  euglycemia    ID:  afebrile    Code status:  Full code  Disposition:  floor

## 2018-06-15 NOTE — PROGRESS NOTE ADULT - PROBLEM SELECTOR PLAN 2
monitor after stenting procedure -     - unclear etiology, but hx/exam most likely consistent with occipital neuralgia. ddx includes poss basilar artery migraines  - f/u with neurologist for continued nerve block/botox/other treatments  - f/u with outpt neurologist regarding results of tests and any other further recommendations  -would hold off triptans at this time given b/l stenosis, history of cardiac disease  -tramadol PRN monitor after stenting procedure - persistent pain  - unclear etiology, but hx/exam most likely consistent with occipital neuralgia. ddx includes poss basilar artery migraines  - f/u with neurologist for continued nerve block/botox/other treatments  - f/u with outpt neurologist regarding results of tests and any other further recommendations  -would hold off triptans at this time given b/l stenosis, history of cardiac disease  -tramadol PRN  -add tylenol ATC - prior dosing reviewed, ok to receive 975mg Q8 hrs

## 2018-06-15 NOTE — PROGRESS NOTE ADULT - SUBJECTIVE AND OBJECTIVE BOX
Patient seen and examined at bedside.    T(C): 36.7 (06-14-18 @ 19:00), Max: 36.7 (06-14-18 @ 19:00)  HR: 64 (06-15-18 @ 01:00) (49 - 69)  BP: 170/80 (06-14-18 @ 04:39) (170/80 - 170/80)  RR: 14 (06-15-18 @ 01:00) (10 - 18)  SpO2: 94% (06-15-18 @ 01:00) (93% - 99%)  Wt(kg): --    EXAM:    AOx3, Following Commands  CN: PERRL, EOMI, no facial droop  Motor: 5/5 throughout, no drift  Sensation intact to light touch  Reflexes: no clonus

## 2018-06-15 NOTE — PROGRESS NOTE ADULT - SUBJECTIVE AND OBJECTIVE BOX
Authored by Dr Juan Angulo [phone]    Patient is a 77y old  Female who presents with a chief complaint of severe headache and syncope (12 Jun 2018 06:27)    SUBJECTIVE / OVERNIGHT EVENTS: s/p stent x2 to L vertebral artery    MEDICATIONS  (STANDING):  amLODIPine   Tablet 10 milliGRAM(s) Oral daily  aspirin enteric coated 325 milliGRAM(s) Oral <User Schedule>  clopidogrel Tablet 75 milliGRAM(s) Oral daily  dextrose 5%. 1000 milliLiter(s) (50 mL/Hr) IV Continuous <Continuous>  dextrose 50% Injectable 12.5 Gram(s) IV Push once  heparin  Injectable 5000 Unit(s) SubCutaneous every 8 hours  insulin lispro (HumaLOG) corrective regimen sliding scale   SubCutaneous three times a day before meals  insulin lispro (HumaLOG) corrective regimen sliding scale   SubCutaneous at bedtime  irbesartan 300 mG/hydrochlorothiazide 12.5 mG 1 Tablet(s) Oral daily  omeprazole 40 milliGRAM(s) Oral daily  rosuvastatin 20 milliGRAM(s) Oral at bedtime    MEDICATIONS  (PRN):  calcium carbonate 500 mG (Tums) Chewable 1 Tablet(s) Chew every 6 hours PRN Heartburn  dextrose 40% Gel 15 Gram(s) Oral once PRN Blood Glucose LESS THAN 70 milliGRAM(s)/deciliter  traMADol 50 milliGRAM(s) Oral every 6 hours PRN Severe Pain (7 - 10)      Vital Signs Last 24 Hrs  T(C): 36.9 (15 [place] 2018 12:28), Max: 36.9 (15 [place] 2018 12:28)  T(F): 98.4 (15 [place] 2018 12:28), Max: 98.4 (15 [place] 2018 12:28)  HR: 55 (15 [place] 2018 12:28) (49 - 69)  BP: 119/77 (15 [place] 2018 12:28) (119/77 - 119/77)  BP(mean): --  RR: 20 (15 [place] 2018 12:28) (11 - 25)  SpO2: 99% (15 [place] 2018 12:28) (90% - 99%)  CAPILLARY BLOOD GLUCOSE      POCT Blood Glucose.: 108 mg/dL (15 [place] 2018 15:08)  POCT Blood Glucose.: 110 mg/dL (15 [place] 2018 08:54)  POCT Blood Glucose.: 146 mg/dL (14 Jun 2018 17:38)    I&O's Summary    14 Jun 2018 07:01  -  15 [place] 2018 07:00  --------------------------------------------------------  IN: 400 mL / OUT: 1785 mL / NET: -1385 mL    15 [place] 2018 07:01  -  15 Jun 2018 15:17  --------------------------------------------------------  IN: 240 mL / OUT: 200 mL / NET: 40 mL        PHYSICAL EXAM  GENERAL: NAD, well-developed  HEAD:  Atraumatic, Normocephalic  EYES: EOMI, PERRLA, conjunctiva and sclera clear  NECK: Supple, No JVD  CHEST/LUNG: Clear to auscultation bilaterally; No wheeze  HEART: Regular rate and rhythm; No murmurs, rubs, or gallops  ABDOMEN: Soft, Nontender, Nondistended; Bowel sounds present  EXTREMITIES:  2+ Peripheral Pulses, No clubbing, cyanosis, or edema  NEUROLOGY: AAOx3; non-focal  SKIN: No rashes or lesions    LABS:                        14.3   7.4   )-----------( 150      ( 14 Jun 2018 21:42 )             43.2     06-14    141  |  104  |  14  ----------------------------<  146<H>  4.2   |  24  |  0.63    Ca    8.3<L>      14 Jun 2018 21:42  Phos  4.3     06-14  Mg     1.9     06-14                  RADIOLOGY & ADDITIONAL TESTS:    Imaging Personally Reviewed:  Consultant(s) Notes Reviewed:  Neuro IR, NSGY, Cards  Care Discussed with Consultants/Other Providers: Authored by Dr Juan Angulo [phone] [phone]    Patient is a 77y old  Female who presents with a chief complaint of severe headache and syncope (12 Jun 2018 06:27)    SUBJECTIVE / OVERNIGHT EVENTS: s/p stent x2 to L vertebral artery. c/o persistent posterior HA unchanged for last few days. persistent posterior neck pain. no cp/sob. no pain at cath site. minimal pain at L wrist a-line site    MEDICATIONS  (STANDING):  amLODIPine   Tablet 10 milliGRAM(s) Oral daily  aspirin enteric coated 325 milliGRAM(s) Oral <User Schedule>  clopidogrel Tablet 75 milliGRAM(s) Oral daily  dextrose 5%. 1000 milliLiter(s) (50 mL/Hr) IV Continuous <Continuous>  dextrose 50% Injectable 12.5 Gram(s) IV Push once  heparin  Injectable 5000 Unit(s) SubCutaneous every 8 hours  insulin lispro (HumaLOG) corrective regimen sliding scale   SubCutaneous three times a day before meals  insulin lispro (HumaLOG) corrective regimen sliding scale   SubCutaneous at bedtime  irbesartan 300 mG/hydrochlorothiazide 12.5 mG 1 Tablet(s) Oral daily  omeprazole 40 milliGRAM(s) Oral daily  rosuvastatin 20 milliGRAM(s) Oral at bedtime    MEDICATIONS  (PRN):  calcium carbonate 500 mG (Tums) Chewable 1 Tablet(s) Chew every 6 hours PRN Heartburn  dextrose 40% Gel 15 Gram(s) Oral once PRN Blood Glucose LESS THAN 70 milliGRAM(s)/deciliter  traMADol 50 milliGRAM(s) Oral every 6 hours PRN Severe Pain (7 - 10)      Vital Signs Last 24 Hrs  T(C): 36.9 (15 [place] 2018 12:28), Max: 36.9 (15 [place] 2018 12:28)  T(F): 98.4 (15 [place] 2018 12:28), Max: 98.4 (15 [place] 2018 12:28)  HR: 55 (15 [place] 2018 12:28) (49 - 69)  BP: 119/77 (15 [place] 2018 12:28) (119/77 - 119/77)  BP(mean): --  RR: 20 (15 [place] 2018 12:28) (11 - 25)  SpO2: 99% (15 [place] 2018 12:28) (90% - 99%)  CAPILLARY BLOOD GLUCOSE      POCT Blood Glucose.: 108 mg/dL (15 [place] 2018 15:08)  POCT Blood Glucose.: 110 mg/dL (15 [place] 2018 08:54)  POCT Blood Glucose.: 146 mg/dL (14 Jun 2018 17:38)    I&O's Summary    14 Jun 2018 07:01  -  15 [place] 2018 07:00  --------------------------------------------------------  IN: 400 mL / OUT: 1785 mL / NET: -1385 mL    15 [place] 2018 07:01  -  15 [place] 2018 15:17  --------------------------------------------------------  IN: 240 mL / OUT: 200 mL / NET: 40 mL        PHYSICAL EXAM  GENERAL: NAD, well-developed  HEAD:  Atraumatic, Normocephalic  EYES: EOMI, PERRLA, conjunctiva and sclera clear  NECK: Supple, No JVD  CHEST/LUNG: Clear to auscultation bilaterally; No wheeze  HEART: Regular rate and rhythm; No murmurs, rubs, or gallops  ABDOMEN: Soft, Nontender, Nondistended; Bowel sounds present  EXTREMITIES:  2+ Peripheral Pulses, No clubbing, cyanosis, or edema  NEUROLOGY: AAOx3; non-focal  SKIN: No rashes or lesions. L wrist w/bandage, no pain. R groin cath site no hematoma, distal pulse intact    LABS:                        14.3   7.4   )-----------( 150      ( 14 Jun 2018 21:42 )             43.2     06-14    141  |  104  |  14  ----------------------------<  146<H>  4.2   |  24  |  0.63    Ca    8.3<L>      14 Jun 2018 21:42  Phos  4.3     06-14  Mg     1.9     06-14                  RADIOLOGY & ADDITIONAL TESTS:    Imaging Personally Reviewed: mri pending  Consultant(s) Notes Reviewed:  Neuro IR, NSGY, Cards  Care Discussed with Consultants/Other Providers:

## 2018-06-15 NOTE — PROGRESS NOTE ADULT - PROBLEM SELECTOR PLAN 5
- cont home irbseartan, hctz, amlodipine, pt requests her own home meds. - cont home irbesartan, hctz, amlodipine, pt requests her own home meds.

## 2018-06-15 NOTE — PROGRESS NOTE ADULT - SUBJECTIVE AND OBJECTIVE BOX
HPI:  Transferred to Neuro floor. No chest pain or dyspnea. Complain of headache.    Medications:  acetaminophen   Tablet. 975 milliGRAM(s) Oral every 8 hours  amLODIPine   Tablet 10 milliGRAM(s) Oral daily  aspirin enteric coated 325 milliGRAM(s) Oral <User Schedule>  calcium carbonate 500 mG (Tums) Chewable 1 Tablet(s) Chew every 6 hours PRN  clopidogrel Tablet 75 milliGRAM(s) Oral daily  dextrose 40% Gel 15 Gram(s) Oral once PRN  dextrose 5%. 1000 milliLiter(s) IV Continuous <Continuous>  dextrose 50% Injectable 12.5 Gram(s) IV Push once  heparin  Injectable 5000 Unit(s) SubCutaneous every 8 hours  insulin lispro (HumaLOG) corrective regimen sliding scale   SubCutaneous three times a day before meals  insulin lispro (HumaLOG) corrective regimen sliding scale   SubCutaneous at bedtime  irbesartan 300 mG/hydrochlorothiazide 12.5 mG 1 Tablet(s) Oral daily  omeprazole 40 milliGRAM(s) Oral daily  rosuvastatin 20 milliGRAM(s) Oral at bedtime  traMADol 50 milliGRAM(s) Oral every 6 hours PRN    PMH/PSH/FH/SH:  Unchanged    ROS:  General: no fatigue/malaise, weight loss/gain.  Skin: no rashes.  Ophthalmologic: see HPI. 	  ENMT: no sore throat, rhinorrhea, sinus congestion.  Respiratory: no SOB, cough or wheeze.  Cardiovascular: no chest pain, dyspnea, palpitations, orthopnea or paroxysmal nocturnal dyspnea. No claudication.  Gastrointestinal:  no N/V/D, no melena/hematemesis/hematochezia.  Genitourinary: no dysuria/hesitancy or hematuria.  Musculoskeletal: no myalgias or arthralgias.  Neurological: no changes in vision or hearing, no lightheadedness/dizziness, no syncope/near syncope	  Psychiatric: appropriate.   Hematology/Lymphatics: no unusual bleeding, bruising and no lymphadenopathy.  Endocrine: no unusual thirst.   All others negative except as stated above and in HPI.     Vitals:  T(C): 36.9 (06-15-18 @ 12:28), Max: 36.9 (06-15-18 @ 12:28)  HR: 55 (06-15-18 @ 12:28) (51 - 69)  BP: 119/77 (06-15-18 @ 12:28) (119/77 - 119/77)  BP(mean): --  RR: 20 (06-15-18 @ 12:28) (11 - 25)  SpO2: 99% (06-15-18 @ 12:28) (90% - 99%)  Wt(kg): --  Daily     Daily     Physical exam:  Appearance: anxious, tearful after worrisome surgical news; generally well appearing, able to lie flat, breathing comfortably on RA  Eyes: PERRL, EOMI, pink conjunctiva  HENT: Normal oral mucosa  Respiratory: normal percussion without RR or W  Cardiovascular: apex non-displaced, RRR, S1 normal, S2 physiologic split, no murmurs, rubs, or gallops; no edema; no JVD  Gastrointestinal: soft, non-tender, non-distended with normal bowel sounds  Musculoskeletal: No clubbing; no joint deformity  Vascular: pulses symmetric and equal to DP/PT  Lymphatic: No lymphadenopathy  Skin: No rashes, stable ecchymoses, no cyanosis  Neurologic: Non-focal  Psychiatry: AAOx3, mood & affect appropriate.     I&O's Summary    14 Jun 2018 07:01  -  15 Jun 2018 07:00  --------------------------------------------------------  IN: 400 mL / OUT: 1785 mL / NET: -1385 mL    15 [place] 2018 07:01  -  15 [place] 2018 16:26  --------------------------------------------------------  IN: 240 mL / OUT: 200 mL / NET: 40 mL                              14.3   7.4   )-----------( 150      ( 14 Jun 2018 21:42 )             43.2     06-14    141  |  104  |  14  ----------------------------<  146<H>  4.2   |  24  |  0.63    Ca    8.3<L>      14 Jun 2018 21:42  Phos  4.3     06-14  Mg     1.9     06-14

## 2018-06-16 ENCOUNTER — TRANSCRIPTION ENCOUNTER (OUTPATIENT)
Age: 78
End: 2018-06-16

## 2018-06-16 VITALS — HEART RATE: 50 BPM | OXYGEN SATURATION: 96 % | RESPIRATION RATE: 18 BRPM | TEMPERATURE: 98 F

## 2018-06-16 LAB
GLUCOSE BLDC GLUCOMTR-MCNC: 111 MG/DL — HIGH (ref 70–99)
GLUCOSE BLDC GLUCOMTR-MCNC: 128 MG/DL — HIGH (ref 70–99)

## 2018-06-16 PROCEDURE — 85027 COMPLETE CBC AUTOMATED: CPT

## 2018-06-16 PROCEDURE — 97161 PT EVAL LOW COMPLEX 20 MIN: CPT

## 2018-06-16 PROCEDURE — 36226 PLACE CATH VERTEBRAL ART: CPT

## 2018-06-16 PROCEDURE — 82962 GLUCOSE BLOOD TEST: CPT

## 2018-06-16 PROCEDURE — 80048 BASIC METABOLIC PNL TOTAL CA: CPT

## 2018-06-16 PROCEDURE — 36227 PLACE CATH XTRNL CAROTID: CPT

## 2018-06-16 PROCEDURE — 99233 SBSQ HOSP IP/OBS HIGH 50: CPT

## 2018-06-16 PROCEDURE — 85576 BLOOD PLATELET AGGREGATION: CPT

## 2018-06-16 PROCEDURE — 70450 CT HEAD/BRAIN W/O DYE: CPT

## 2018-06-16 PROCEDURE — 86140 C-REACTIVE PROTEIN: CPT

## 2018-06-16 PROCEDURE — C1769: CPT

## 2018-06-16 PROCEDURE — 83735 ASSAY OF MAGNESIUM: CPT

## 2018-06-16 PROCEDURE — 86900 BLOOD TYPING SEROLOGIC ABO: CPT

## 2018-06-16 PROCEDURE — 85652 RBC SED RATE AUTOMATED: CPT

## 2018-06-16 PROCEDURE — A9585: CPT

## 2018-06-16 PROCEDURE — C1760: CPT

## 2018-06-16 PROCEDURE — 80061 LIPID PANEL: CPT

## 2018-06-16 PROCEDURE — 84100 ASSAY OF PHOSPHORUS: CPT

## 2018-06-16 PROCEDURE — 93005 ELECTROCARDIOGRAM TRACING: CPT

## 2018-06-16 PROCEDURE — 83036 HEMOGLOBIN GLYCOSYLATED A1C: CPT

## 2018-06-16 PROCEDURE — 36224 PLACE CATH CAROTD ART: CPT

## 2018-06-16 PROCEDURE — 99285 EMERGENCY DEPT VISIT HI MDM: CPT | Mod: 25

## 2018-06-16 PROCEDURE — 85610 PROTHROMBIN TIME: CPT

## 2018-06-16 PROCEDURE — 82553 CREATINE MB FRACTION: CPT

## 2018-06-16 PROCEDURE — 70544 MR ANGIOGRAPHY HEAD W/O DYE: CPT

## 2018-06-16 PROCEDURE — C1887: CPT

## 2018-06-16 PROCEDURE — 84443 ASSAY THYROID STIM HORMONE: CPT

## 2018-06-16 PROCEDURE — 70498 CT ANGIOGRAPHY NECK: CPT

## 2018-06-16 PROCEDURE — 0075T PERQ STENT/CHEST VERT ART: CPT

## 2018-06-16 PROCEDURE — 70549 MR ANGIOGRAPH NECK W/O&W/DYE: CPT

## 2018-06-16 PROCEDURE — C1894: CPT

## 2018-06-16 PROCEDURE — 82550 ASSAY OF CK (CPK): CPT

## 2018-06-16 PROCEDURE — 93306 TTE W/DOPPLER COMPLETE: CPT

## 2018-06-16 PROCEDURE — 99232 SBSQ HOSP IP/OBS MODERATE 35: CPT

## 2018-06-16 PROCEDURE — 70496 CT ANGIOGRAPHY HEAD: CPT

## 2018-06-16 PROCEDURE — 80053 COMPREHEN METABOLIC PANEL: CPT

## 2018-06-16 PROCEDURE — 93010 ELECTROCARDIOGRAM REPORT: CPT

## 2018-06-16 PROCEDURE — 85730 THROMBOPLASTIN TIME PARTIAL: CPT

## 2018-06-16 PROCEDURE — 84484 ASSAY OF TROPONIN QUANT: CPT

## 2018-06-16 PROCEDURE — 86901 BLOOD TYPING SEROLOGIC RH(D): CPT

## 2018-06-16 PROCEDURE — 86850 RBC ANTIBODY SCREEN: CPT

## 2018-06-16 PROCEDURE — 71045 X-RAY EXAM CHEST 1 VIEW: CPT

## 2018-06-16 RX ORDER — TRAMADOL HYDROCHLORIDE 50 MG/1
1 TABLET ORAL
Qty: 0 | Refills: 0 | COMMUNITY
Start: 2018-06-16

## 2018-06-16 RX ORDER — ASPIRIN/CALCIUM CARB/MAGNESIUM 324 MG
1 TABLET ORAL
Qty: 0 | Refills: 0 | COMMUNITY
Start: 2018-06-16

## 2018-06-16 RX ORDER — ASPIRIN/CALCIUM CARB/MAGNESIUM 324 MG
1 TABLET ORAL
Qty: 0 | Refills: 0 | COMMUNITY

## 2018-06-16 RX ORDER — ASPIRIN/CALCIUM CARB/MAGNESIUM 324 MG
1 TABLET ORAL
Qty: 0 | Refills: 0 | DISCHARGE
Start: 2018-06-16

## 2018-06-16 RX ORDER — ACETAMINOPHEN 500 MG
3 TABLET ORAL
Qty: 0 | Refills: 0 | COMMUNITY
Start: 2018-06-16

## 2018-06-16 RX ORDER — CLOPIDOGREL BISULFATE 75 MG/1
1 TABLET, FILM COATED ORAL
Qty: 0 | Refills: 0 | DISCHARGE
Start: 2018-06-16

## 2018-06-16 RX ADMIN — Medication 325 MILLIGRAM(S): at 06:21

## 2018-06-16 RX ADMIN — HEPARIN SODIUM 5000 UNIT(S): 5000 INJECTION INTRAVENOUS; SUBCUTANEOUS at 06:21

## 2018-06-16 RX ADMIN — OMEPRAZOLE 40 MILLIGRAM(S): 10 CAPSULE, DELAYED RELEASE ORAL at 06:21

## 2018-06-16 RX ADMIN — IRBESARTAN AND HYDROCHLOROTHIAZIDE 1 TABLET(S): 12.5; 3 TABLET ORAL at 06:21

## 2018-06-16 RX ADMIN — CLOPIDOGREL BISULFATE 75 MILLIGRAM(S): 75 TABLET, FILM COATED ORAL at 11:18

## 2018-06-16 RX ADMIN — HEPARIN SODIUM 5000 UNIT(S): 5000 INJECTION INTRAVENOUS; SUBCUTANEOUS at 13:15

## 2018-06-16 NOTE — PROGRESS NOTE ADULT - PROBLEM SELECTOR PLAN 1
s/p stenting to L vertebral artery.   c/w DAPT, Crestor
neuro apprecaited  - unclear etiology, but hx/exam most likely consistent with occipital neuralgia. ddx includes poss basilar artery migraines  - toradol 30mg IV q8 hrs for attempt at abortive therapy.   - f/u with neurologist for continued nerve block/botox/other treatments  - f/u with outpt neurologist regarding results of tests and any other further recommendations  -would hold off triptans at this time given b/l stenosis, history of cardiac disease
neurosrugery appreciated, patient for OR today.  -patient with RCRI of 2 equating to 6.6% risk of MACE event.  Patient is medically optimized for procedure and is a moderate risk patient for a high risk procedure.
s/p stenting to L vertebral artery. c/w DAPT, Crestor
neuro and nsg consults appreciated. s/p toradol with improvement in the headache.   - unclear etiology, but hx/exam most likely consistent with occipital neuralgia. ddx includes poss basilar artery migraines  - continue toradol 30mg IV q8 hrs as abortive therapy.   - f/u with neurologist for continued nerve block/botox/other treatments  - f/u with outpt neurologist regarding results of tests and any other further recommendations  -would hold off triptans at this time given b/l stenosis, history of cardiac disease

## 2018-06-16 NOTE — DISCHARGE NOTE ADULT - PATIENT PORTAL LINK FT
You can access the [facility], offered by [facility], by registering with the following website: [email or web address]

## 2018-06-16 NOTE — PROGRESS NOTE ADULT - PROBLEM SELECTOR PROBLEM 1
Stenosis of both vertebral arteries
Occipital headache
Stenosis of both vertebral arteries
Stenosis of both vertebral arteries
Occipital headache

## 2018-06-16 NOTE — PROGRESS NOTE ADULT - PROBLEM SELECTOR PROBLEM 4
Coronary artery disease of native artery of native heart with stable angina pectoris

## 2018-06-16 NOTE — PROGRESS NOTE ADULT - ASSESSMENT
77F c hx migraines, occipital neuralgia s/p many different injections, CAD s/p multiple stents (2007, 2013, 2017) on DAPT, HTN, HLD, DM2, multiple back surgeries c/b collapsed lung remotely, urinary incontinence, dysphagia, subclavian stenosis, presents with syncope and severe headache.    Pt has long history of headaches, migraines, and occipital neuralgia, and their treatments, which include, triptans, tramadol, toradol, botox injections, nerve blocks, other types of injections. Pt has very good documentation for her care, and tells me she has had "388 injections" in the past year. Starting in early May, pt reports having 7 episodes of sudden onset severe headaches that have gotten progressively longer, initially lasting 10 minutes "like a shovel hit the back of my head", then subsiding into a duller pain that can last anywhere from hours to many days, associated with sudden onset b/l painless complete vision loss lasting 10 seconds. The first 5 times this occurred when pt was sleeping and was awoke from sleep, and pt could not see out of her eyes. The last two times occurred May 21 when she was getting a breast sonogram, and on May 30 when she was at home placing contacts into her eyes. On May 30, pt states it came on suddenly, and she suddenly fell to the ground and was unable to move or see, despite being awake. Pt states the headache from May 30th has not complete gone away. She went to see her neurologist who ordered "5 different tests", and then also told her to go see her cardiologist. Pt went to see her cardiologist who was not available, and instead saw Dr. Brown who was covering, who told her to go to the hospital. Reportedly, MRI of pt's neck showed C2-C3/C6-C7 herniated disc with nerve root compression. Pt states her occipital headache is currently exacerbated by tilting her head backward, R occiput > L occiput. Pt states that toradol and nerve root block of the occiput has helped her occipital headache tremendously. Pt has had occipital neuralgia in the past, which feels like "a sharp knife stabbing" and different from these episodes. Pt also has hx of migraines with photophobia that can be terminated with her triptan.  Pt denies fevers, chills, URI symptoms, chest pain, SOB, palpitations, vomiting, diarrhea, abd pain. She has chronic urinary frequency. She also reports nausea when she was having a couple episodes of vertigo while on the MRI table, each episode lasting about 5minutes. Pt states she went to see a vertigo specialist for this. Pt also has hx of nystagmus that went away. MRA brain shows moderate to severe left vertebral stenosis.        PROCEDURE:  6/14 s/p cerebral angiogram/stents/angioplasty  POD#2    PLAN:  Neuro:   - headache/pain- pt does not want to take pain medication  - continue aspirin 325 mg, plavix 75 mg  Respiratory:   - encouraged incentive spirometry  CV:  - HTN/CAD/stetns/HLD- continue amlodipine, avalide, statin  Endocrine:   - DMT2- continue fingersticks with insulin coverage   DVT ppx:   - venodynes, sq lovenox    PT/OT:  - likely outpatient PT vs no needs    Spectralink # [id number]

## 2018-06-16 NOTE — DISCHARGE NOTE ADULT - MEDICATION SUMMARY - MEDICATIONS TO TAKE
I will START or STAY ON the medications listed below when I get home from the hospital:    acetaminophen 325 mg oral tablet  -- 3 tab(s) by mouth every 8 hours  -- Indication: For Headache    traMADol 50 mg oral tablet  -- 1 tab(s) by mouth every 6 hours, As needed, Severe Pain (7 - 10)  -- Indication: For Severe pain    aspirin 325 mg oral delayed release tablet  -- 1 tab(s) by mouth   -- Indication: For Stents    frovatriptan 2.5 mg oral tablet  -- 1 tab(s) by mouth , As Needed at headache onset and repeat at max 48 3 tab  -- Indication: For migraine    metFORMIN 500 mg oral tablet  -- 1 tab(s) by mouth once a day  may restart 11/11/17 am  -- Indication: For diabetes    rosuvastatin 20 mg oral tablet  -- 1 tab(s) by mouth once a day (at bedtime)  -- Indication: For Cholesterol    irbesartan-hydrochlorothiazide 300mg-12.5mg oral tablet  -- 1 tab(s) by mouth once a day  -- Indication: For Essential hypertension    clopidogrel 75 mg oral tablet  -- 1 tab(s) by mouth once a day  -- Indication: For Stents    amLODIPine 10 mg oral tablet  -- 1 tab(s) by mouth once a day  -- Indication: For Essential hypertension    omeprazole 40 mg oral delayed release capsule  -- 1 cap(s) by mouth once a day  -- Indication: For reflux    Estrace Vaginal 0.1 mg/g vaginal cream  -- 1 gram(s) vaginally 3 times a week  -- Indication: For vaginal cream    multivitamin  -- Indication: For vitamin    cyanocobalamin 500 mcg oral tablet  -- Indication: For vitamin

## 2018-06-16 NOTE — DISCHARGE NOTE ADULT - CARE PLAN
Principal Discharge DX:	Stenosis of both vertebral arteries  Goal:	6/14 s/p cerebral angiogram/stents/angioplasty of left vertebral artery  Assessment and plan of treatment:	Continue 325 mg aspirin and plavix. Make appointment for follow up with Dr Scott in 7-10 days. Any sign of severe headache not relieved with pain medication return to ER  Secondary Diagnosis:	Coronary artery disease of native artery of native heart with stable angina pectoris  Goal:	stable  Assessment and plan of treatment:	Make appointment for follow up with your Cardiologist in 1 week  Secondary Diagnosis:	Essential hypertension  Goal:	stable  Assessment and plan of treatment:	Continue norvasc and avalide. Make appointment for follow up with your Cardiologist in 1 week  Secondary Diagnosis:	Occipital headache  Goal:	stable  Assessment and plan of treatment:	Make appointment for follow up with Dr Scott in 7-10 days. Any sign of severe headache not relieved with pain medication return to ER

## 2018-06-16 NOTE — PHYSICAL THERAPY INITIAL EVALUATION ADULT - PERTINENT HX OF CURRENT PROBLEM, REHAB EVAL
77F c hx migraines, occipital neuralgia s/p many different injections, CAD s/p multiple stents (2007, 2013, 2017) on DAPT, HTN, HLD, DM2, multiple back surgeries c/b collapsed lung remotely, urinary incontinence, dysphagia, subclavian stenosis, presents with worsening headache transient vision loss, now s/p L vertebral stenting for vertebral stenosis

## 2018-06-16 NOTE — PROGRESS NOTE ADULT - PROBLEM SELECTOR PLAN 3
Do NOT think these events were vasovagal syncope given story.  Patient without LOC during these events, transient vision loss and vertigo.  -can obtain TTE given murmur
These events are likely all a result of the vertebral artery disease.   The patient is not orthostatic (checked today).  has known sinus bradycardia for many years with rates frequently in the 40s/min. Her cardiologist is aware and not planning on doing any interventions.
Do NOT think these events were vasovagal syncope given story.  Patient without LOC during these events, transient vision loss and vertigo.
Do NOT think these events were vasovagal syncope given story.  Patient without LOC during these events, transient vision loss and vertigo.  -can obtain TTE given murmur
doubt syncope

## 2018-06-16 NOTE — PROGRESS NOTE ADULT - SUBJECTIVE AND OBJECTIVE BOX
SUBJECTIVE: Pt seen and examined, doing well, reports pain in the back of her head but she does not want to take any pain medication    OVERNIGHT EVENTS: none    Vital Signs Last 24 Hrs  T(C): 36.6 (16 Jun 2018 07:59), Max: 36.8 (15 [place] 2018 21:00)  T(F): 97.9 (16 Jun 2018 07:59), Max: 98.2 (15 [place] 2018 21:00)  HR: 52 (16 Jun 2018 07:59) (45 - 52)  BP: 123/72 (16 Jun 2018 07:59) (109/62 - 130/68)  BP(mean): --  RR: 18 (16 Jun 2018 07:59) (16 - 18)  SpO2: 96% (16 Jun 2018 07:59) (90% - 99%)    PHYSICAL EXAM:    General: No Acute Distress     Neurological: Awake, alert oriented to person, place and time, Following Commands, PERRL, EOMI, Face Symmetrical, Speech Fluent, Moving all extremities, Muscle Strength normal in all four extremities, No Drift, Sensation to Light Touch Intact    Pulmonary: Clear to Auscultation, No Rales, No Rhonchi, No Wheezes     Cardiovascular: S1, S2, Regular Rate and Rhythm     Gastrointestinal: Soft, Nontender, Nondistended     Incision: rt groin site no hematoma, no ecchymosis    LABS:                        14.3   7.4   )-----------( 150      ( 14 Jun 2018 21:42 )             43.2    06-14    141  |  104  |  14  ----------------------------<  146<H>  4.2   |  24  |  0.63    Ca    8.3<L>      14 Jun 2018 21:42  Phos  4.3     06-14  Mg     1.9     06-14      Hemoglobin A1C, Whole Blood: 6.2 % (06-13 @ 07:40)      06-15 @ 07:01  -  06-16 @ 07:00  --------------------------------------------------------  IN: 240 mL / OUT: 200 mL / NET: 40 mL      DRAINS: none    MEDICATIONS:  Antibiotics:    Neuro:  acetaminophen   Tablet. 975 milliGRAM(s) Oral every 8 hours  traMADol 50 milliGRAM(s) Oral every 6 hours PRN Severe Pain (7 - 10)    Cardiac:  amLODIPine   Tablet 10 milliGRAM(s) Oral daily  irbesartan 300 mG/hydrochlorothiazide 12.5 mG 1 Tablet(s) Oral daily    Pulm:    GI/:  calcium carbonate 500 mG (Tums) Chewable 1 Tablet(s) Chew every 6 hours PRN Heartburn  omeprazole 40 milliGRAM(s) Oral daily    Other:   aspirin enteric coated 325 milliGRAM(s) Oral <User Schedule>  clopidogrel Tablet 75 milliGRAM(s) Oral daily  dextrose 40% Gel 15 Gram(s) Oral once PRN Blood Glucose LESS THAN 70 milliGRAM(s)/deciliter  dextrose 5%. 1000 milliLiter(s) IV Continuous <Continuous>  dextrose 50% Injectable 12.5 Gram(s) IV Push once  heparin  Injectable 5000 Unit(s) SubCutaneous every 8 hours  insulin lispro (HumaLOG) corrective regimen sliding scale   SubCutaneous three times a day before meals  insulin lispro (HumaLOG) corrective regimen sliding scale   SubCutaneous at bedtime  rosuvastatin 20 milliGRAM(s) Oral at bedtime    DIET: [x] Regular [] CCD [] Renal [] Puree [] Dysphagia [] Tube Feeds:     IMAGING:   < from: MR Angio Head No Cont (06.15.18 @ 14:27) >  NTERPRETATION:  Clinical indication: Recent deployment of left vertebral   artery stent for distal left vertebral artery stenosis.    A 3-D axial noncontrast MRA were performed on the cervical and   intracranial vessels, respectively. Intravascular flow quantification was   performed using gated 2D phase contrast MR, imaged perpendicular to the   vessel axis.  Images were post processed NOVA software and a NOVA flow   study report is available. Comparison is made with the NOVA obtained   prior to the intervention.    Flow is as follows in milliliters per minute.      , RVA distal 122, LVA 62, LVA distal 50, , RPCA 44, LPCA 61   previously    , RVA distal 72, LVA 42, LVA distal 51, BA 91, RPCA 33, LPCA 53.    JEANIE 217, RMCA 125, RACA 103, RACA2 68 compared with prior    JEANIE 165, RMCA 90, RACA 76, RACA2 58.    LICA 213, LMCA 131, LACA 76, LACA2 83 compared with prior    LICA 158, LMCA 113, LACA 64, LACA2 52.        Impression: Following the deployment of a stent in the distal left   vertebral artery noninvasive MR angiography was performed and compared   with the preop examination of 6/13/2018.    < end of copied text >

## 2018-06-16 NOTE — DISCHARGE NOTE ADULT - PLAN OF CARE
6/14 s/p cerebral angiogram/stents/angioplasty of left vertebral artery Continue 325 mg aspirin and plavix. Make appointment for follow up with Dr Scott in 7-10 days. Any sign of severe headache not relieved with pain medication return to ER stable Make appointment for follow up with your Cardiologist in 1 week Continue norvasc and avalide. Make appointment for follow up with your Cardiologist in 1 week Make appointment for follow up with Dr Scott in 7-10 days. Any sign of severe headache not relieved with pain medication return to ER

## 2018-06-16 NOTE — DISCHARGE NOTE ADULT - ADDITIONAL INSTRUCTIONS
Any sign of fever, chills, severe headache not relieved with pain medication, dizziness or fainting return to the ER.

## 2018-06-16 NOTE — DISCHARGE NOTE ADULT - HOSPITAL COURSE
77F c hx migraines, occipital neuralgia s/p many different injections, CAD s/p multiple stents (2007, 2013, 2017) on DAPT, HTN, HLD, DM2, multiple back surgeries c/b collapsed lung remotely, urinary incontinence, dysphagia, subclavian stenosis, presents with syncope and severe headache.    Pt has long history of headaches, migraines, and occipital neuralgia, and their treatments, which include, triptans, tramadol, toradol, botox injections, nerve blocks, other types of injections. Pt has very good documentation for her care, and tells me she has had "388 injections" in the past year. Starting in early May, pt reports having 7 episodes of sudden onset severe headaches that have gotten progressively longer, initially lasting 10 minutes "like a shovel hit the back of my head", then subsiding into a duller pain that can last anywhere from hours to many days, associated with sudden onset b/l painless complete vision loss lasting 10 seconds. The first 5 times this occurred when pt was sleeping and was awoke from sleep, and pt could not see out of her eyes. The last two times occurred May 21 when she was getting a breast sonogram, and on May 30 when she was at home placing contacts into her eyes. On May 30, pt states it came on suddenly, and she suddenly fell to the ground and was unable to move or see, despite being awake. Pt states the headache from May 30th has not complete gone away. She went to see her neurologist who ordered "5 different tests", and then also told her to go see her cardiologist. Pt went to see her cardiologist who was not available, and instead saw Dr. Brown who was covering, who told her to go to the hospital. Reportedly, MRI of pt's neck showed C2-C3/C6-C7 herniated disc with nerve root compression. Pt states her occipital headache is currently exacerbated by tilting her head backward, R occiput > L occiput. Pt states that toradol and nerve root block of the occiput has helped her occipital headache tremendously. Pt has had occipital neuralgia in the past, which feels like "a sharp knife stabbing" and different from these episodes. Pt also has hx of migraines with photophobia that can be terminated with her triptan.  Pt denies fevers, chills, URI symptoms, chest pain, SOB, palpitations, vomiting, diarrhea, abd pain. She has chronic urinary frequency. She also reports nausea when she was having a couple episodes of vertigo while on the MRI table, each episode lasting about 5minutes. Pt states she went to see a vertigo specialist for this. Pt also has hx of nystagmus that went away. MRA brain shows moderate to severe left vertebral stenosis.    On  6/14/18 pt is  s/p cerebral angiogram/stents/angioplasty of left vertebral artery for vertebral stenosis. Pt is currently taking aspirin 325 mg and plavix 75 mg. Pt remained in the Neuro ICU until stable and was subsequently transferred to the floor. Pt was evaluated by Physical Therapy and no PT needs were recommended. Pt is medically and neurologically stable for discharge to home.

## 2018-06-16 NOTE — DISCHARGE NOTE ADULT - CARE PROVIDER_API CALL
Jose Scott), Neurological Surgery  [address] [place] [address]  Phone: [phone]  Fax: [phone]    Hiram Gil (MD), Cardiovascular Disease; Internal Medicine; Nuclear Cardiology  [address] [facility] [address]  [place] [address]  Phone: [phone]  Fax: [phone]

## 2018-06-16 NOTE — PHYSICAL THERAPY INITIAL EVALUATION ADULT - PREDICTED DURATION OF THERAPY (DAYS/WKS), PT EVAL
Pt independent with functional mobility and will be d/c from skilled PT services, pt demonstrates verbal understanding and agreement. Pt will remain on ambulation aid program.

## 2018-06-16 NOTE — DISCHARGE NOTE ADULT - NS AS ACTIVITY OBS
Stairs allowed/Showering allowed/Walking-Indoors allowed/Do not drive or operate machinery/No Heavy lifting/straining/Walking-Outdoors allowed

## 2018-06-16 NOTE — PROGRESS NOTE ADULT - PROBLEM SELECTOR PLAN 2
monitor after stenting procedure - persistent pain (this is apparently a known side-effect after this type of procedure)  - unclear etiology, but hx/exam most likely consistent with occipital neuralgia. ddx includes poss basilar artery migraines  - f/u with neurologist for continued nerve block/botox/other treatments  - f/u with outpt neurologist regarding results of tests and any other further recommendations  -would hold off triptans at this time given b/l stenosis, history of cardiac disease  -tramadol PRN

## 2018-06-16 NOTE — PHYSICAL THERAPY INITIAL EVALUATION ADULT - LIVES WITH, PROFILE
alone/Patient lives alone in private home in [facility]. Patient was independent with ambulation and daily tasks prior to admission. alone/Patient lives alone in private home in [facility]. Patient was independent with ambulation and daily tasks prior to admission.  Pt reports she uses garage entrance with 3 stairs to entrance, +HR.

## 2018-06-16 NOTE — PROGRESS NOTE ADULT - PROBLEM SELECTOR PLAN 4
-continue crestor  -can hold off asa, plavix if urgent neurosurgical intervention indication  -appreciate Dr. Jeffries's recs
Continue crestor, DAPT, ARB
- cont asa, plavix, crestor
-continue crestor  -appreciate cards recs  DAPT, ARB
-continue crestor  -can hold off asa, plavix if urgent neurosurgical intervention indication  -appreciate Dr. Jeffries's recs

## 2018-06-16 NOTE — PROGRESS NOTE ADULT - SUBJECTIVE AND OBJECTIVE BOX
Lewis Logan MD  ([facility] Hospitalist)  Pager [phone]  (During off hours please page [phone])      Patient is a 77y old  Female who presents with a chief complaint of severe headache and syncope (16 Jun 2018 13:18)      SUBJECTIVE / OVERNIGHT EVENTS: No events overnight. C/o chronic neck pain.     MEDICATIONS  (STANDING):  acetaminophen   Tablet. 975 milliGRAM(s) Oral every 8 hours  amLODIPine   Tablet 10 milliGRAM(s) Oral daily  aspirin enteric coated 325 milliGRAM(s) Oral <User Schedule>  clopidogrel Tablet 75 milliGRAM(s) Oral daily  dextrose 5%. 1000 milliLiter(s) (50 mL/Hr) IV Continuous <Continuous>  dextrose 50% Injectable 12.5 Gram(s) IV Push once  heparin  Injectable 5000 Unit(s) SubCutaneous every 8 hours  insulin lispro (HumaLOG) corrective regimen sliding scale   SubCutaneous three times a day before meals  insulin lispro (HumaLOG) corrective regimen sliding scale   SubCutaneous at bedtime  irbesartan 300 mG/hydrochlorothiazide 12.5 mG 1 Tablet(s) Oral daily  omeprazole 40 milliGRAM(s) Oral daily  rosuvastatin 20 milliGRAM(s) Oral at bedtime    MEDICATIONS  (PRN):  calcium carbonate 500 mG (Tums) Chewable 1 Tablet(s) Chew every 6 hours PRN Heartburn  dextrose 40% Gel 15 Gram(s) Oral once PRN Blood Glucose LESS THAN 70 milliGRAM(s)/deciliter  traMADol 50 milliGRAM(s) Oral every 6 hours PRN Severe Pain (7 - 10)      Vital Signs Last 24 Hrs  T(C): 36.8 (16 Jun 2018 12:49), Max: 36.8 (15 [place] 2018 21:00)  T(F): 98.3 (16 Jun 2018 12:49), Max: 98.3 (16 Jun 2018 12:49)  HR: 50 (16 Jun 2018 12:49) (45 - 55)  BP: 127/64 (16 Jun 2018 12:03) (109/62 - 130/68)  BP(mean): --  RR: 18 (16 Jun 2018 12:49) (16 - 18)  SpO2: 96% (16 Jun 2018 12:49) (90% - 99%)  CAPILLARY BLOOD GLUCOSE      POCT Blood Glucose.: 128 mg/dL (16 Jun 2018 12:46)  POCT Blood Glucose.: 111 mg/dL (16 Jun 2018 08:33)  POCT Blood Glucose.: 134 mg/dL (15 [place] 2018 22:03)  POCT Blood Glucose.: 167 mg/dL (15 [place] 2018 17:40)    I&O's Summary    15 [place] 2018 07:01  -  16 Jun 2018 07:00  --------------------------------------------------------  IN: 240 mL / OUT: 200 mL / NET: 40 mL        PHYSICAL EXAM:  GENERAL: NAD, well-developed  HEAD:  Atraumatic, Normocephalic  EYES: EOMI, conjunctiva and sclera clear  NECK: Supple, No JVD  CHEST/LUNG: Clear to auscultation bilaterally; No wheeze  HEART: Regular rate and rhythm; No murmurs, rubs, or gallops  ABDOMEN: Soft, Nontender, Nondistended; Bowel sounds present  EXTREMITIES:  2+ Peripheral Pulses, No clubbing, cyanosis, or edema  NEUROLOGY: AAOx3; non-focal      LABS:                        14.3   7.4   )-----------( 150      ( 14 Jun 2018 21:42 )             43.2     06-14    141  |  104  |  14  ----------------------------<  146<H>  4.2   |  24  |  0.63    Ca    8.3<L>      14 Jun 2018 21:42  Phos  4.3     06-14  Mg     1.9     06-14        RADIOLOGY & ADDITIONAL TESTS:    Imaging Personally Reviewed: MRI head    Consultant(s) Notes Reviewed: Neurosurgery    Care Discussed with Consultants/Other Providers: Neurosurgery re dispo, bradycardia

## 2018-06-16 NOTE — DISCHARGE NOTE ADULT - CARE PROVIDERS DIRECT ADDRESSES
,DirectAddress_Unknown,[email or web address]

## 2018-06-16 NOTE — PROGRESS NOTE ADULT - SUBJECTIVE AND OBJECTIVE BOX
77F hx CAD s/p multiple stents (2007, 2013, 2017) on DAPT, HTN, HLD, DM2  Pt has long history of headaches. Starting in early May, pt had severe headaches. On May 30, pt had severe H/A, suddenly fell to the ground and was unable to move or see, despite being awake.  Referred to ER and admitted.  W/U revealed VBI, and now s/p successful left vertebral artery stent placement.          Transferred to Neuro floor. No chest pain or dyspnea. Complain of headache.          Medications:  acetaminophen   Tablet. 975 milliGRAM(s) Oral every 8 hours  amLODIPine   Tablet 10 milliGRAM(s) Oral daily  aspirin enteric coated 325 milliGRAM(s) Oral <User Schedule>  calcium carbonate 500 mG (Tums) Chewable 1 Tablet(s) Chew every 6 hours PRN  clopidogrel Tablet 75 milliGRAM(s) Oral daily  dextrose 40% Gel 15 Gram(s) Oral once PRN  dextrose 5%. 1000 milliLiter(s) IV Continuous <Continuous>  dextrose 50% Injectable 12.5 Gram(s) IV Push once  heparin  Injectable 5000 Unit(s) SubCutaneous every 8 hours  insulin lispro (HumaLOG) corrective regimen sliding scale   SubCutaneous three times a day before meals  insulin lispro (HumaLOG) corrective regimen sliding scale   SubCutaneous at bedtime  irbesartan 300 mG/hydrochlorothiazide 12.5 mG 1 Tablet(s) Oral daily  omeprazole 40 milliGRAM(s) Oral daily  rosuvastatin 20 milliGRAM(s) Oral at bedtime  traMADol 50 milliGRAM(s) Oral every 6 hours PRN    PMH/PSH/FH/SH:  Unchanged      ROS:  General: no fatigue/malaise, weight loss/gain.  Skin: no rashes.  Ophthalmologic: see HPI. 	  ENMT: no sore throat, rhinorrhea, sinus congestion.  Respiratory: no SOB, cough or wheeze.  Cardiovascular: no chest pain, dyspnea, palpitations, orthopnea or paroxysmal nocturnal dyspnea. No claudication.  Gastrointestinal:  no N/V/D, no melena/hematemesis/hematochezia.  Genitourinary: no dysuria/hesitancy or hematuria.  Musculoskeletal: no myalgias or arthralgias.  Neurological: no changes in vision or hearing, no lightheadedness/dizziness, no syncope/near syncope	  Psychiatric: appropriate.   Hematology/Lymphatics: no unusual bleeding, bruising and no lymphadenopathy.  Endocrine: no unusual thirst.   All others negative except as stated above and in HPI.       Vitals:  T(C): 36.8 (06-16-18 @ 12:49), Max: 36.8 (06-15-18 @ 21:00)  HR: 50 (06-16-18 @ 12:49) (45 - 55)  BP: 127/64 (06-16-18 @ 12:03) (109/62 - 130/68)  RR: 18 (06-16-18 @ 12:49) (16 - 18)  SpO2: 96% (06-16-18 @ 12:49) (90% - 99%)      Physical exam:  Appearance: anxious, tearful after worrisome surgical news; generally well appearing, able to lie flat, breathing comfortably on RA  Eyes: PERRL, EOMI, pink conjunctiva  HENT: Normal oral mucosa  Respiratory: normal percussion without RR or W  Cardiovascular: apex non-displaced, RRR, S1 normal, S2 physiologic split, no murmurs, rubs, or gallops; no edema; no JVD  Gastrointestinal: soft, non-tender, non-distended with normal bowel sounds  Musculoskeletal: No clubbing; no joint deformity  Vascular: pulses symmetric and equal to DP/PT  Lymphatic: No lymphadenopathy  Skin: No rashes, stable ecchymoses, no cyanosis  Neurologic: Non-focal  Psychiatry: AAOx3, mood & affect appropriate.      I&O's Summary    15 [place] 2018 07:01  -  16 Jun 2018 07:00  --------------------------------------------------------  IN: 240 mL / OUT: 200 mL / NET: 40 mL    LABS:                        14.3   7.4   )-----------( 150      ( 14 Jun 2018 21:42 )             43.2     06-14    141  |  104  |  14  ----------------------------<  146<H>  4.2   |  24  |  0.63    Ca    8.3<L>      14 Jun 2018 21:42  Phos  4.3     06-14  Mg     1.9     06-14      Assessment		  77 year old F, long history of CAD, s/p multiple percutaneous coronary interventions with preserved left ventricular function.  Admitted with worsening H/A, visual disturbance and fall; now s/p successful left vertebral artery stent placement.    1. Coronary disease   - continue DAPT with aspirin and clopidogrel.  - continue statin    2. Hypertension  - continue irbesartan and amlodipine    3. Cerobrovascular disease -    management per Neurosurgery    Obie Dye M.D. (covering for Dr. HANSEL Gil).  Office: [phone]  Beeper: [phone] 77F hx CAD s/p multiple stents (2007, 2013, 2017) on DAPT, HTN, HLD, DM2  Pt has long history of headaches. Starting in early May, pt had severe headaches. On May 30, pt had severe H/A, suddenly fell to the ground and was unable to move or see, despite being awake.  Referred to ER and admitted.  W/U revealed VBI, and now s/p successful left vertebral artery stent placement.  Still with mild H/A, but doing well overall.  No cardiac sxs.        Medications:  acetaminophen   Tablet. 975 milliGRAM(s) Oral every 8 hours  amLODIPine   Tablet 10 milliGRAM(s) Oral daily  aspirin enteric coated 325 milliGRAM(s) Oral <User Schedule>  calcium carbonate 500 mG (Tums) Chewable 1 Tablet(s) Chew every 6 hours PRN  clopidogrel Tablet 75 milliGRAM(s) Oral daily  dextrose 40% Gel 15 Gram(s) Oral once PRN  dextrose 5%. 1000 milliLiter(s) IV Continuous <Continuous>  dextrose 50% Injectable 12.5 Gram(s) IV Push once  heparin  Injectable 5000 Unit(s) SubCutaneous every 8 hours  insulin lispro (HumaLOG) corrective regimen sliding scale   SubCutaneous three times a day before meals  insulin lispro (HumaLOG) corrective regimen sliding scale   SubCutaneous at bedtime  irbesartan 300 mG/hydrochlorothiazide 12.5 mG 1 Tablet(s) Oral daily  omeprazole 40 milliGRAM(s) Oral daily  rosuvastatin 20 milliGRAM(s) Oral at bedtime  traMADol 50 milliGRAM(s) Oral every 6 hours PRN    PMH/PSH/FH/SH:  Unchanged    ROS:  General: no fatigue/malaise, weight loss/gain.  Skin: no rashes.  Ophthalmologic: see HPI. 	  ENT: no sore throat, rhinorrhea, sinus congestion.  Respiratory: no SOB, cough or wheeze.  Cardiovascular: no chest pain, dyspnea, palpitations, orthopnea or paroxysmal nocturnal dyspnea. No claudication.  Gastrointestinal:  no N/V/D, no melena/hematemesis/hematochezia.  Genitourinary: no dysuria/hesitancy or hematuria.  Musculoskeletal: no myalgias or arthralgias.  Neurological: no changes in vision or hearing, no lightheadedness/dizziness, no syncope/near syncope	  Psychiatric: appropriate.   Hematology/Lymphatics: no unusual bleeding, bruising and no lymphadenopathy.  Endocrine: no unusual thirst.   All others negative except as stated above and in HPI.       Vitals:  T(C): 36.8 (06-16-18 @ 12:49), Max: 36.8 (06-15-18 @ 21:00)  HR: 50 (06-16-18 @ 12:49) (45 - 55)  BP: 127/64 (06-16-18 @ 12:03) (109/62 - 130/68)  RR: 18 (06-16-18 @ 12:49) (16 - 18)  SpO2: 96% (06-16-18 @ 12:49) (90% - 99%)      Physical exam:  Appearance: alert, no distress  Eyes: PERRL, EOMI, pink conjunctiva  HEENT: Normal oral mucosa  Respiratory:  clear lungs.  Cardiovascular:  PMI not displaced, RRR, S1 normal, S2 physiologic split; no murmurs, rubs, or gallops.  No edema; no JVD  Gastrointestinal: soft, non-tender, non-distended with normal bowel sounds  Musculoskeletal: No clubbing; no joint deformity  Vascular: pulses symmetric and equal to DP/PT  Lymphatic: No lymphadenopathy  Skin: No rashes, stable ecchymoses, no cyanosis  Neurologic: Non-focal  Psychiatry: AAOx3, mood & affect appropriate.      I&O's Summary    15 [place] 2018 07:01  -  16 Jun 2018 07:00  --------------------------------------------------------  IN: 240 mL / OUT: 200 mL / NET: 40 mL    LABS:                        14.3   7.4   )-----------( 150      ( 14 Jun 2018 21:42 )             43.2     06-14    141  |  104  |  14  ----------------------------<  146<H>  4.2   |  24  |  0.63    Ca    8.3<L>      14 Jun 2018 21:42  Phos  4.3     06-14  Mg     1.9     06-14      Assessment		  77 year old F, long history of CAD, s/p multiple percutaneous coronary interventions with preserved left ventricular function.  Admitted with worsening H/A, visual disturbance and fall; now s/p successful left vertebral artery stent placement.    1. Coronary disease   - continue DAPT with aspirin and clopidogrel.  - continue statin    2. Hypertension  - continue irbesartan and amlodipine    3. Cerebrovascular disease -    management per Neurosurgery    No objection from cardiac standpoint to planned discharge today.    Obie Dye M.D. (covering for Dr. HANSEL Gil).  Office: [phone]  Beeper: [phone]

## 2018-06-16 NOTE — PROGRESS NOTE ADULT - PROBLEM SELECTOR PROBLEM 2
Stenosis of both vertebral arteries
Occipital headache
Stenosis of both vertebral arteries

## 2018-06-16 NOTE — DISCHARGE NOTE ADULT - SECONDARY DIAGNOSIS.
Coronary artery disease of native artery of native heart with stable angina pectoris Essential hypertension Occipital headache

## 2018-06-16 NOTE — PROGRESS NOTE ADULT - PROVIDER SPECIALTY LIST ADULT
Cardiology
Hospitalist
NSICU
NSICU
Neurosurgery
Neurosurgery
Hospitalist
Hospitalist

## 2018-06-16 NOTE — DISCHARGE NOTE ADULT - MEDICATION SUMMARY - MEDICATIONS TO CHANGE
I will SWITCH the dose or number of times a day I take the medications listed below when I get home from the hospital:    aspirin 81 mg oral delayed release tablet  -- 1 tab(s) by mouth once a day

## 2018-06-21 ENCOUNTER — NON-APPOINTMENT (OUTPATIENT)
Age: 78
End: 2018-06-21

## 2018-06-21 ENCOUNTER — APPOINTMENT (OUTPATIENT)
Dept: CARDIOLOGY | Facility: CLINIC | Age: 78
End: 2018-06-21
Payer: MEDICARE

## 2018-06-21 ENCOUNTER — APPOINTMENT (OUTPATIENT)
Dept: NEUROSURGERY | Facility: CLINIC | Age: 78
End: 2018-06-21
Payer: MEDICARE

## 2018-06-21 VITALS
WEIGHT: 147 LBS | TEMPERATURE: 97.7 F | DIASTOLIC BLOOD PRESSURE: 72 MMHG | SYSTOLIC BLOOD PRESSURE: 152 MMHG | HEIGHT: 64 IN | BODY MASS INDEX: 25.1 KG/M2 | HEART RATE: 63 BPM | OXYGEN SATURATION: 97 %

## 2018-06-21 VITALS
SYSTOLIC BLOOD PRESSURE: 124 MMHG | BODY MASS INDEX: 24.24 KG/M2 | WEIGHT: 142 LBS | HEIGHT: 64 IN | DIASTOLIC BLOOD PRESSURE: 66 MMHG | HEART RATE: 82 BPM

## 2018-06-21 PROCEDURE — 99215 OFFICE O/P EST HI 40 MIN: CPT

## 2018-06-21 PROCEDURE — 93000 ELECTROCARDIOGRAM COMPLETE: CPT

## 2018-08-06 ENCOUNTER — INPATIENT (INPATIENT)
Facility: HOSPITAL | Age: 78
LOS: 3 days | Discharge: HOME | DRG: 149 | End: 2018-08-10
Attending: HOSPITALIST | Admitting: INTERNAL MEDICINE
Payer: MEDICARE

## 2018-08-06 VITALS
DIASTOLIC BLOOD PRESSURE: 72 MMHG | OXYGEN SATURATION: 97 % | TEMPERATURE: 98 F | RESPIRATION RATE: 18 BRPM | HEART RATE: 78 BPM | SYSTOLIC BLOOD PRESSURE: 157 MMHG

## 2018-08-06 DIAGNOSIS — I65.09 OCCLUSION AND STENOSIS OF UNSPECIFIED VERTEBRAL ARTERY: Chronic | ICD-10-CM

## 2018-08-06 DIAGNOSIS — R00.2 PALPITATIONS: ICD-10-CM

## 2018-08-06 LAB
ALBUMIN SERPL ELPH-MCNC: 4.5 G/DL — SIGNIFICANT CHANGE UP (ref 3.3–5)
ALP SERPL-CCNC: 110 U/L — SIGNIFICANT CHANGE UP (ref 40–120)
ALT FLD-CCNC: 17 U/L — SIGNIFICANT CHANGE UP (ref 10–45)
ANION GAP SERPL CALC-SCNC: 13 MMOL/L — SIGNIFICANT CHANGE UP (ref 5–17)
APTT BLD: 30.6 SEC — SIGNIFICANT CHANGE UP (ref 27.5–37.4)
AST SERPL-CCNC: 21 U/L — SIGNIFICANT CHANGE UP (ref 10–40)
BASOPHILS # BLD AUTO: 0 K/UL — SIGNIFICANT CHANGE UP (ref 0–0.2)
BASOPHILS NFR BLD AUTO: 0.5 % — SIGNIFICANT CHANGE UP (ref 0–2)
BILIRUB SERPL-MCNC: 0.4 MG/DL — SIGNIFICANT CHANGE UP (ref 0.2–1.2)
BUN SERPL-MCNC: 14 MG/DL — SIGNIFICANT CHANGE UP (ref 7–23)
CALCIUM SERPL-MCNC: 9.3 MG/DL — SIGNIFICANT CHANGE UP (ref 8.4–10.5)
CHLORIDE SERPL-SCNC: 103 MMOL/L — SIGNIFICANT CHANGE UP (ref 96–108)
CO2 SERPL-SCNC: 24 MMOL/L — SIGNIFICANT CHANGE UP (ref 22–31)
CREAT SERPL-MCNC: 0.72 MG/DL — SIGNIFICANT CHANGE UP (ref 0.5–1.3)
EOSINOPHIL # BLD AUTO: 0.1 K/UL — SIGNIFICANT CHANGE UP (ref 0–0.5)
EOSINOPHIL NFR BLD AUTO: 1.4 % — SIGNIFICANT CHANGE UP (ref 0–6)
GAS PNL BLDV: SIGNIFICANT CHANGE UP
GLUCOSE SERPL-MCNC: 131 MG/DL — HIGH (ref 70–99)
HCT VFR BLD CALC: 42.5 % — SIGNIFICANT CHANGE UP (ref 34.5–45)
HGB BLD-MCNC: 14.3 G/DL — SIGNIFICANT CHANGE UP (ref 11.5–15.5)
INR BLD: 0.93 RATIO — SIGNIFICANT CHANGE UP (ref 0.88–1.16)
LYMPHOCYTES # BLD AUTO: 2.5 K/UL — SIGNIFICANT CHANGE UP (ref 1–3.3)
LYMPHOCYTES # BLD AUTO: 38.5 % — SIGNIFICANT CHANGE UP (ref 13–44)
MAGNESIUM SERPL-MCNC: 2.2 MG/DL — SIGNIFICANT CHANGE UP (ref 1.6–2.6)
MCHC RBC-ENTMCNC: 30 PG — SIGNIFICANT CHANGE UP (ref 27–34)
MCHC RBC-ENTMCNC: 33.6 GM/DL — SIGNIFICANT CHANGE UP (ref 32–36)
MCV RBC AUTO: 89.1 FL — SIGNIFICANT CHANGE UP (ref 80–100)
MONOCYTES # BLD AUTO: 0.7 K/UL — SIGNIFICANT CHANGE UP (ref 0–0.9)
MONOCYTES NFR BLD AUTO: 10.6 % — SIGNIFICANT CHANGE UP (ref 2–14)
NEUTROPHILS # BLD AUTO: 3.1 K/UL — SIGNIFICANT CHANGE UP (ref 1.8–7.4)
NEUTROPHILS NFR BLD AUTO: 49 % — SIGNIFICANT CHANGE UP (ref 43–77)
PHOSPHATE SERPL-MCNC: 3 MG/DL — SIGNIFICANT CHANGE UP (ref 2.5–4.5)
PLATELET # BLD AUTO: 166 K/UL — SIGNIFICANT CHANGE UP (ref 150–400)
POTASSIUM SERPL-MCNC: 3.6 MMOL/L — SIGNIFICANT CHANGE UP (ref 3.5–5.3)
POTASSIUM SERPL-SCNC: 3.6 MMOL/L — SIGNIFICANT CHANGE UP (ref 3.5–5.3)
PROT SERPL-MCNC: 7.2 G/DL — SIGNIFICANT CHANGE UP (ref 6–8.3)
PROTHROM AB SERPL-ACNC: 10.1 SEC — SIGNIFICANT CHANGE UP (ref 9.8–12.7)
RBC # BLD: 4.77 M/UL — SIGNIFICANT CHANGE UP (ref 3.8–5.2)
RBC # FLD: 12.8 % — SIGNIFICANT CHANGE UP (ref 10.3–14.5)
SODIUM SERPL-SCNC: 140 MMOL/L — SIGNIFICANT CHANGE UP (ref 135–145)
TROPONIN T, HIGH SENSITIVITY RESULT: 10 NG/L — SIGNIFICANT CHANGE UP (ref 0–51)
TROPONIN T, HIGH SENSITIVITY RESULT: 10 NG/L — SIGNIFICANT CHANGE UP (ref 0–51)
WBC # BLD: 6.4 K/UL — SIGNIFICANT CHANGE UP (ref 3.8–10.5)
WBC # FLD AUTO: 6.4 K/UL — SIGNIFICANT CHANGE UP (ref 3.8–10.5)

## 2018-08-06 PROCEDURE — 93010 ELECTROCARDIOGRAM REPORT: CPT

## 2018-08-06 PROCEDURE — 99285 EMERGENCY DEPT VISIT HI MDM: CPT | Mod: 25

## 2018-08-06 NOTE — ED PROVIDER NOTE - PSH
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

## 2018-08-06 NOTE — ED PROVIDER NOTE - OBJECTIVE STATEMENT
79 y/o F with h/o migraines, occipital neuralgia s/p many different injections, CAD s/p multiple stents (2007, 2013, 2017) on DAPT, HTN, HLD, DM2, multiple back surgeries w/ collapsed lung remote hx, urinary incontinence, dysphagia, subclavian stenosis, presents with 79 y/o F with h/o migraines, occipital neuralgia s/p many different injections, CAD s/p multiple stents (2007, 2013, 2017) on DAPT, HTN, HLD, DM2, multiple back surgeries w/ collapsed lung remote hx, urinary incontinence, dysphagia, subclavian stenosis, s/p 2 stents placed in b/l vertebral arteries 6/14/18 presents with 11 episodes of lightheadedness, palpitations and SOB occurring intermittently since June 28th 2018. Pt states that the episodes occur with exertion never at rest, she has a list of events surrounding each episodes and it appears the episodes frequently happen while pt is entering or leaving either a store or her car. Pt denies diaphoresis, CP, back pain, abdo pain, n/v, HA, arm/neck/jaw pain, or syncope associated with these events, denies VILLA. Pt states she has had vertigo in the past but these episodes are different. She had one episode today when climbing the stairs at home and a family friend who is a nurse used a device to check her pulse and O2 sat- HR: 48, 96% on room air. She states she has had a pulse in the 40's previously and when in the hospital. Pt has no known h/o cardiac dysrhythmia, she has felt palpitations in the past but has never been symptomatic. Pt contacted her cardiologist, Dr. Ugarte, last week and was told to go to ER if sxs persist. She also contacted her neurosurgeon Dr. Jose Scott, who did not believe this problem was neurologic, but he scheduled pt for an oupt NOVA 8/15/18. Pt states she also has had increased swelling of her L leg for the past 2 weeks, has previously had orthopedic surgeries on the leg, was evaluated by her ortho provider with negative work-up, states this problem is chronic and intermittent. Pt denies fever, chills, recent illness, change in vision, numbness, paresthesias, weakness, change in gait, difficulty speaking/swallowing outside baseline. 79 y/o F with h/o migraines, occipital neuralgia s/p many different injections, CAD s/p multiple stents (2007, 2013, 2017) on DAPT, HTN, HLD, DM2, multiple back surgeries w/ collapsed lung remote hx, urinary incontinence, dysphagia, subclavian stenosis, s/p 2 stents placed in b/l vertebral arteries 6/14/18 presents with 11 episodes of lightheadedness, palpitations and SOB occurring intermittently since June 28th 2018. Pt states that the episodes occur with exertion never at rest, she has a list of events surrounding each episodes and it appears the episodes frequently happen while pt is entering or leaving either a store or her car. Pt denies diaphoresis, CP, back pain, abdo pain, n/v, HA, arm/neck/jaw pain, or syncope associated with these events, denies VILLA. Pt states she has had vertigo in the past but these episodes are different. She had one episode today when climbing the stairs at home and a family friend who is a nurse used a device to check her pulse and O2 sat- HR: 48, 96% on room air. She states she has had a pulse in the 40's previously and when in the hospital. Pt has no known h/o cardiac dysrhythmia, she has felt palpitations in the past but has never been symptomatic. Pt contacted her cardiologist, Dr. Gil, last week and was told to go to ER if sxs persist. She also contacted her neurosurgeon Dr. Jose Scott, who did not believe this problem was neurologic, but he scheduled pt for an oupt NOVA 8/15/18. Pt states she also has had increased swelling of her L leg for the past 2 weeks, has previously had orthopedic surgeries on the leg, was evaluated by her ortho provider with negative work-up, states this problem is chronic and intermittent. Pt denies fever, chills, recent illness, change in vision, numbness, paresthesias, weakness, change in gait, difficulty speaking/swallowing outside baseline.

## 2018-08-06 NOTE — ED ADULT NURSE NOTE - OBJECTIVE STATEMENT
pt has been feeling episodes of dizziness since June 18th pt has all the instances documented and there about 10-15 of them.  pt also claims periods of shortness of breath and chest tightness Pt has hx of  brain stents Pt states its not vertigo nor is it the same symptoms she had when she had prior to the brain stents IVl placed and bloods sent as ordered.

## 2018-08-06 NOTE — ED PROVIDER NOTE - ATTENDING CONTRIBUTION TO CARE
attending Pollack: 78yF h/o migraines, occipital neuralgia, CAD s/p stents (2007, 2013, 2017), HTN, HLD, DM2, subclavian stenosis, s/p vertebral artery stents placed 6/14/18 presents with increasing frequency of episodic lightheadedness, palpitations and SOB occurring during exertion. Pt reports feeling of impending doom during episodes. Last stress test 2 years ago. Seen by cardiologist outpatient for same and told to come to ER if symptoms persisted. Asymptomatic in ED. Will obtain ekg, place on tele, labs including cardiac enzymes and admit

## 2018-08-06 NOTE — ED PROVIDER NOTE - PHYSICAL EXAMINATION
Pt in NAD, A&O x3. CN 2-12 grossly intact. Head NCAT, sclera white w/o injection PERRLA, EOMI. Nares patent, turbinates w/o edema or erythema, no polyps or septal deviation. No auricular tenderness, TMs pearly grey. Mouth and pharynx w/o erythema or exudates, uvula midline, no tonsillar enlargement. Trachea midline, no lymphadenopathy, no JVD. No carotid bruits. No retractions or chest wall deformities. No chest wall tenderness, CTA anterior and posterior b/l, no wheezes, rales, or rhonchi. RRR clear distinct S1, S2, no S3, S4, murmurs, gallops, or rubs. Abdomen soft, non-tender, no organomegaly or masses. No CVAT b/l. 2+ carotid, radial, and posterior tibial pulses b/l. LLE mildly swollen compared to R, 1+ pitting edema b/l LE. LE nontender b/l. Spine without any obvious deformity, no midline or paraspinal tenderness. Normal and equal sensation UE, LE and face b/l. 3/5 strength R shoulder ABduction c/w chronic atrophy from prior rotator cuff surgery, otherwise 5/5 strength upper and lower extremity b/l. Romberg negative, pronator drift negative. No rashes noted.

## 2018-08-06 NOTE — ED ADULT NURSE NOTE - NSIMPLEMENTINTERV_GEN_ALL_ED
Implemented All Universal Safety Interventions:  [place] to call system. Call bell, personal items and telephone within reach. Instruct patient to call for assistance. Room bathroom lighting operational. Non-slip footwear when patient is off stretcher. Physically safe environment: no spills, clutter or unnecessary equipment. Stretcher in lowest position, wheels locked, appropriate side rails in place.

## 2018-08-06 NOTE — ED ADULT NURSE REASSESSMENT NOTE - NS ED NURSE REASSESS COMMENT FT1
Pt resting comfortably in bed.  VSS.  NAD.  Understands she is waiting for admission.  PERRL wnl, malloy with equal strength.  Denies chest pain or sob.

## 2018-08-07 DIAGNOSIS — I10 ESSENTIAL (PRIMARY) HYPERTENSION: ICD-10-CM

## 2018-08-07 DIAGNOSIS — R42 DIZZINESS AND GIDDINESS: ICD-10-CM

## 2018-08-07 DIAGNOSIS — I25.10 ATHEROSCLEROTIC HEART DISEASE OF NATIVE CORONARY ARTERY WITHOUT ANGINA PECTORIS: ICD-10-CM

## 2018-08-07 DIAGNOSIS — I65.09 OCCLUSION AND STENOSIS OF UNSPECIFIED VERTEBRAL ARTERY: ICD-10-CM

## 2018-08-07 DIAGNOSIS — Z29.9 ENCOUNTER FOR PROPHYLACTIC MEASURES, UNSPECIFIED: ICD-10-CM

## 2018-08-07 DIAGNOSIS — E78.00 PURE HYPERCHOLESTEROLEMIA, UNSPECIFIED: ICD-10-CM

## 2018-08-07 LAB
BLD GP AB SCN SERPL QL: NEGATIVE — SIGNIFICANT CHANGE UP
GLUCOSE BLDC GLUCOMTR-MCNC: 125 MG/DL — HIGH (ref 70–99)
GLUCOSE BLDC GLUCOMTR-MCNC: 140 MG/DL — HIGH (ref 70–99)
GLUCOSE BLDC GLUCOMTR-MCNC: 175 MG/DL — HIGH (ref 70–99)
GLUCOSE BLDC GLUCOMTR-MCNC: 198 MG/DL — HIGH (ref 70–99)
GLUCOSE BLDC GLUCOMTR-MCNC: 96 MG/DL — SIGNIFICANT CHANGE UP (ref 70–99)
PA ADP PRP-ACNC: 28 PRU — LOW (ref 194–417)
RH IG SCN BLD-IMP: POSITIVE — SIGNIFICANT CHANGE UP

## 2018-08-07 PROCEDURE — 70551 MRI BRAIN STEM W/O DYE: CPT | Mod: 26

## 2018-08-07 PROCEDURE — 99223 1ST HOSP IP/OBS HIGH 75: CPT

## 2018-08-07 PROCEDURE — 12345: CPT

## 2018-08-07 PROCEDURE — 71045 X-RAY EXAM CHEST 1 VIEW: CPT | Mod: 26

## 2018-08-07 RX ORDER — AMLODIPINE BESYLATE 2.5 MG/1
10 TABLET ORAL DAILY
Qty: 0 | Refills: 0 | Status: DISCONTINUED | OUTPATIENT
Start: 2018-08-07 | End: 2018-08-10

## 2018-08-07 RX ORDER — ESTRADIOL 4 UG/1
1 INSERT VAGINAL
Qty: 0 | Refills: 0 | COMMUNITY

## 2018-08-07 RX ORDER — DEXTROSE 50 % IN WATER 50 %
15 SYRINGE (ML) INTRAVENOUS ONCE
Qty: 0 | Refills: 0 | Status: DISCONTINUED | OUTPATIENT
Start: 2018-08-07 | End: 2018-08-10

## 2018-08-07 RX ORDER — CLOPIDOGREL BISULFATE 75 MG/1
75 TABLET, FILM COATED ORAL DAILY
Qty: 0 | Refills: 0 | Status: DISCONTINUED | OUTPATIENT
Start: 2018-08-07 | End: 2018-08-10

## 2018-08-07 RX ORDER — DEXTROSE 50 % IN WATER 50 %
25 SYRINGE (ML) INTRAVENOUS ONCE
Qty: 0 | Refills: 0 | Status: DISCONTINUED | OUTPATIENT
Start: 2018-08-07 | End: 2018-08-10

## 2018-08-07 RX ORDER — HYDROCHLOROTHIAZIDE 25 MG
12.5 TABLET ORAL DAILY
Qty: 0 | Refills: 0 | Status: DISCONTINUED | OUTPATIENT
Start: 2018-08-07 | End: 2018-08-10

## 2018-08-07 RX ORDER — ASPIRIN/CALCIUM CARB/MAGNESIUM 324 MG
325 TABLET ORAL DAILY
Qty: 0 | Refills: 0 | Status: DISCONTINUED | OUTPATIENT
Start: 2018-08-07 | End: 2018-08-10

## 2018-08-07 RX ORDER — INSULIN LISPRO 100/ML
VIAL (ML) SUBCUTANEOUS
Qty: 0 | Refills: 0 | Status: DISCONTINUED | OUTPATIENT
Start: 2018-08-07 | End: 2018-08-10

## 2018-08-07 RX ORDER — SODIUM CHLORIDE 9 MG/ML
1000 INJECTION, SOLUTION INTRAVENOUS
Qty: 0 | Refills: 0 | Status: DISCONTINUED | OUTPATIENT
Start: 2018-08-07 | End: 2018-08-10

## 2018-08-07 RX ORDER — ATORVASTATIN CALCIUM 80 MG/1
80 TABLET, FILM COATED ORAL AT BEDTIME
Qty: 0 | Refills: 0 | Status: DISCONTINUED | OUTPATIENT
Start: 2018-08-07 | End: 2018-08-10

## 2018-08-07 RX ORDER — DEXTROSE 50 % IN WATER 50 %
12.5 SYRINGE (ML) INTRAVENOUS ONCE
Qty: 0 | Refills: 0 | Status: DISCONTINUED | OUTPATIENT
Start: 2018-08-07 | End: 2018-08-10

## 2018-08-07 RX ORDER — INSULIN LISPRO 100/ML
VIAL (ML) SUBCUTANEOUS AT BEDTIME
Qty: 0 | Refills: 0 | Status: DISCONTINUED | OUTPATIENT
Start: 2018-08-07 | End: 2018-08-10

## 2018-08-07 RX ADMIN — Medication 1 MILLIGRAM(S): at 12:50

## 2018-08-07 RX ADMIN — Medication 2: at 11:34

## 2018-08-07 NOTE — H&P ADULT - ASSESSMENT
77 y/o F with h/o migraines, occipital neuralgia s/p many different injections, CAD s/p multiple stents (2007, 2013, 2017) on DAPT, HTN, HLD, DM2, multiple back surgeries w/ collapsed lung remote hx, urinary incontinence, dysphagia, subclavian stenosis, s/p 2 stents placed in b/l vertebral arteries 6/14/18 presents with 11 episodes of lightheadedness, palpitations and SOB precipitated by exertion.

## 2018-08-07 NOTE — PHYSICAL THERAPY INITIAL EVALUATION ADULT - ADDITIONAL COMMENTS
Pt lives alone in a PH +1 flight of stairs to negotiate with HR. Pt reports she was ambulating independently without use of an AD and was independent with all ADLs prior. Owns R/W. Pt reports many episodes of SOB and feeling dizzy, able to hold on to furniture when they occur. Pt reports she has not fallen during any of her pre-syncopal episodes

## 2018-08-07 NOTE — CONSULT NOTE ADULT - SUBJECTIVE AND OBJECTIVE BOX
p ([id number])     HPI:  79 y/o F with h/o migraines, occipital neuralgia s/p many different injections, CAD s/p multiple stents (2007, 2013, 2017) on DAPT, HTN, HLD, DM2, multiple back surgeries w/ collapsed lung remote hx, urinary incontinence, dysphagia, subclavian stenosis, s/p 2 stents placed in b/l vertebral arteries 6/14/18 presents with 11 episodes of lightheadedness, palpitations and SOB occurring intermittently since June 28th 2018. Pt states that the episodes occur with exertion never at rest, she has a list of events surrounding each episodes and it appears the episodes frequently happen while pt is entering or leaving either a store or her car. Pt denies diaphoresis, CP, back pain, abdo pain, n/v, HA, arm/neck/jaw pain, or syncope associated with these events, denies VILLA. Pt states she has had vertigo in the past but these episodes are different. She had one episode today when climbing the stairs at home and a family friend who is a nurse used a device to check her pulse and O2 sat- HR: 48, 96% on room air. She states she has had a pulse in the 40's previously and when in the hospital. Patient had another episode earlier today when she was walking to get her mail on flat ground. She states the trip was 2 car lengths each way and when she got to the door way on her way back she experienced dizziness and SOB which lasted a little over 2 minutes. Pt has no known h/o cardiac dysrhythmia, she has felt palpitations in the past but has never been symptomatic. Pt contacted her cardiologist, Dr. Gil, last week and was told to go to ER if sxs persist. She also contacted her neurosurgeon Dr. Jose Scott, who did not believe this problem was neurologic, but he scheduled pt for an oupt NOVA 8/15/18. Pt states she also has had increased swelling of her L leg for the past 2 weeks, has previously had orthopedic surgeries on the leg, was evaluated by her ortho provider with negative work-up, states this problem is chronic and intermittent. Pt denies fever, chills, recent illness, change in vision, numbness, paresthesias, weakness, change in gait, difficulty speaking/swallowing outside baseline. (07 Aug 2018 01:16)    PAST MEDICAL HISTORY   Vertebral artery stenosis, unspecified laterality  Family history of coronary artery disease  Hypercholesteremia  Hypertension  MI (myocardial infarction)  CAD (coronary artery disease)    PAST SURGICAL HISTORY   Vertebral artery stenosis  S/P lumpectomy of breast  S/P cholecystectomy  H/O vaginal surgery  H/O fall  Rib deformity    Reglan (Other)      MEDICATIONS:  Antibiotics:    Neuro:  aspirin 325 milliGRAM(s) Oral daily    Anticoagulation:  clopidogrel Tablet 75 milliGRAM(s) Oral daily    Other:  amLODIPine   Tablet 10 milliGRAM(s) Oral daily  atorvastatin 80 milliGRAM(s) Oral at bedtime  dextrose 40% Gel 15 Gram(s) Oral once PRN  dextrose 5%. 1000 milliLiter(s) IV Continuous <Continuous>  dextrose 50% Injectable 12.5 Gram(s) IV Push once  dextrose 50% Injectable 25 Gram(s) IV Push once  hydrochlorothiazide 12.5 milliGRAM(s) Oral daily  insulin lispro (HumaLOG) corrective regimen sliding scale   SubCutaneous three times a day before meals  insulin lispro (HumaLOG) corrective regimen sliding scale   SubCutaneous at bedtime      SOCIAL HISTORY:   Occupation:   Marital Status:     FAMILY HISTORY:  No pertinent family history in first degree relatives      REVIEW OF SYSTEMS:  Check here if all are normal other than Neurological []  General:  Eyes:  ENT:  Cardiac:  Respiratory:  GI:  Musculoskeletal:   Skin:  Neurologic:   Psychiatric:     PHYSICAL EXAMINATION:   T(C): 36.4 (08-07-18 @ 03:17), Max: 37.2 (08-06-18 @ 22:25)  HR: 69 (08-07-18 @ 06:21) (57 - 78)  BP: 149/61 (08-07-18 @ 06:21) (148/75 - 157/72)  RR: 18 (08-07-18 @ 03:17) (17 - 18)  SpO2: 98% (08-07-18 @ 03:17) (97% - 98%)  Wt(kg): --Height (cm): 162.56 (08-07 @ 03:17)  Weight (kg): 65.3 (08-07 @ 03:17)    General Examination:     Neurologic Examination:           AOx3, FC, PERRL, EOMI, V1-3 intact, no facial, palate cooper symmetric, tongue midline, shrug 5/5  5/5 throughout, no drift  SILT  No clonus or babinski  no dysmetria, no dysdiadokinesia  LABS:                        14.3   6.4   )-----------( 166      ( 06 Aug 2018 21:32 )             42.5     08-06    140  |  103  |  14  ----------------------------<  131<H>  3.6   |  24  |  0.72    Ca    9.3      06 Aug 2018 21:32  Phos  3.0     08-06  Mg     2.2     08-06    TPro  7.2  /  Alb  4.5  /  TBili  0.4  /  DBili  x   /  AST  21  /  ALT  17  /  AlkPhos  110  08-06    PT/INR - ( 06 Aug 2018 21:32 )   PT: 10.1 sec;   INR: 0.93 ratio         PTT - ( 06 Aug 2018 21:32 )  PTT:30.6 sec      RADIOLOGY & ADDITIONAL STUDIES:

## 2018-08-07 NOTE — PHYSICAL THERAPY INITIAL EVALUATION ADULT - PERTINENT HX OF CURRENT PROBLEM, REHAB EVAL
77 y/o F with h/o migraines, occipital neuralgia s/p many different injections, CAD s/p multiple stents (2007, 2013, 2017) 2 stents placed in b/l vertebral arteries on 6/14/18 now presents with 11 episodes of lightheadedness, palpitations and SOB occurring intermittently since June 28th 2018.

## 2018-08-07 NOTE — CONSULT NOTE ADULT - ATTENDING COMMENTS
I have seen and examined this patient with the stroke neurology team.     History was reviewed with the patient and/or available family members.   ROS: All negative except documented in the HPI.   Neurological exam was performed and agree with exam as documented above.   Laboratory results and imaging studies were reviewed by me.   I agree with the neurology resident note as documented above.    78 years old woman with multiple vascular risk factors and history of recent left vertebral artery stent placement is evaluated [facility] for episodic dizziness. MRI brain did not show any evidence of acute infarct. Conventional angiogram did not show any evidence of in-stent thrombosis/stenosis. Neurological examination today is grossly nonfocal.    Impression:  Dizziness/vertiginous sensations - the exact etiology of her dizziness remains unclear differential diagnoses include vestibular migraine versus inner ear pathology like BPV and vestibular neuronitis and unlikely to be secondary to cerebrovascular phenomena like stroke or TIA    Plan:  - Antiplatelet therapy as per neurosurgery/Dr. Scott for intracranial and extracranial stent  - Atorvastatin as per medical indications or as per home dose  - ENT evaluation including an electronystagmogram  - Riboflavin 400 mg once a day for headache prophylaxis  - Vestibular rehabilitation as outpatient  - Would continue to follow    Above-mentioned plan was discussed with patient in detail. All the questions were answered and concerns were addressed.

## 2018-08-07 NOTE — H&P ADULT - PROBLEM SELECTOR PLAN 1
Pt with multiple episodes in past. Reportedly neurosurgeon does not think neuro related at moment. Will continue assessment for cardiac etiology.   -Consult Dr. Gil/cards group in AM, defer to cardiology further work up if pt stable overnight  -Telemetry  -PT consult

## 2018-08-07 NOTE — PHYSICAL THERAPY INITIAL EVALUATION ADULT - PRECAUTIONS/LIMITATIONS, REHAB EVAL
cardiac precautions/Pt states that the episodes occur with exertion never at rest, she has a list of events surrounding each episodes and it appears the episodes frequently happen while pt is entering or leaving either a store or her car. Reports vertigo in the past but states these episodes are different. Pt contacted her cardiologist who told to go to ER if sxs persist. Contacted her neurosurgeon Dr. Jose Scott, who did not believe this problem was neurologic, but he scheduled pt for an oupt NOVA 8/15/18. CXr 8/7: Left basilar subsegmental atelectasis. No focal consolidation

## 2018-08-07 NOTE — H&P ADULT - HISTORY OF PRESENT ILLNESS
77 y/o F with h/o migraines, occipital neuralgia s/p many different injections, CAD s/p multiple stents (2007, 2013, 2017) on DAPT, HTN, HLD, DM2, multiple back surgeries w/ collapsed lung remote hx, urinary incontinence, dysphagia, subclavian stenosis, s/p 2 stents placed in b/l vertebral arteries 6/14/18 presents with 11 episodes of lightheadedness, palpitations and SOB occurring intermittently since June 28th 2018. Pt states that the episodes occur with exertion never at rest, she has a list of events surrounding each episodes and it appears the episodes frequently happen while pt is entering or leaving either a store or her car. Pt denies diaphoresis, CP, back pain, abdo pain, n/v, HA, arm/neck/jaw pain, or syncope associated with these events, denies VILLA. Pt states she has had vertigo in the past but these episodes are different. She had one episode today when climbing the stairs at home and a family friend who is a nurse used a device to check her pulse and O2 sat- HR: 48, 96% on room air. She states she has had a pulse in the 40's previously and when in the hospital. Patient had another episode earlier today when she was walking to get her mail on flat ground. She states the trip was 2 car lengths each way and when she got to the door way on her way back she experienced dizziness and SOB which lasted a little over 2 minutes. Pt has no known h/o cardiac dysrhythmia, she has felt palpitations in the past but has never been symptomatic. Pt contacted her cardiologist, Dr. Gil, last week and was told to go to ER if sxs persist. She also contacted her neurosurgeon Dr. Jose Scott, who did not believe this problem was neurologic, but he scheduled pt for an oupt NOVA 8/15/18. Pt states she also has had increased swelling of her L leg for the past 2 weeks, has previously had orthopedic surgeries on the leg, was evaluated by her ortho provider with negative work-up, states this problem is chronic and intermittent. Pt denies fever, chills, recent illness, change in vision, numbness, paresthesias, weakness, change in gait, difficulty speaking/swallowing outside baseline.

## 2018-08-07 NOTE — PROGRESS NOTE ADULT - PROBLEM SELECTOR PLAN 1
Etiologies include: cerebrovascular complication of recent vertebral artery stenting vs. other vestibular dysfunction vs. doubt but must consider cardiogenic given patient with bradycardia and 1st degree AV block on EKG vs. possibly psychosomatic/panic disorder  - f/u neurolog recs for any further vestibular workup  - f/u MRI/MRA NOVA and neurosurgery recs re: vertebral stent patency  - called Dr. Gil, who feels this is likely not cardiac, and will see patient tomorrow.  - seen by PT- no episodes, deemed to be safe for home  - continue monitoring on telemetry for now Etiologies include: cerebrovascular complication of recent vertebral artery stenting vs. other vestibular dysfunction vs. doubt but must consider cardiogenic given patient with bradycardia and 1st degree AV block on EKG vs. dx of exclusion- possibly psychosomatic/panic disorder  - discussed at length with OP neurologist Dr. Ortiz's PA, Marii [phone], who states patient had severe symptoms of positional vertigo and vision loss found to be due to vertebral artery stenoses which were successfully stented last month. She experienced tremendous improvement in symptoms, but then developed lightheadedness for which she had some vestibular testing which was reportedly negative. Pt had planned NOVA study for next week with Dr. Scott  - f/u neurolog recs for any further vestibular workup  - f/u MRI/MRA NOVA and neurosurgery recs re: vertebral stent patency  - called Dr. Gil, who feels this is likely not cardiac, and will see patient tomorrow.  - seen by PT- no episodes, deemed to be safe for home  - continue monitoring on telemetry for now

## 2018-08-07 NOTE — ED ADULT NURSE REASSESSMENT NOTE - NS ED NURSE REASSESS COMMENT FT1
Patient received bed assignment. Patient aware of assignment. Report given to receiving RN. VSS. Patient stable for transport. Chart given to charge desk. Safety and comfort maintained while in ED. Patient received bed assignment. Patient aware of assignment. Report given to receiving RN. VSS. Patient stable for transport. As per MD Alejandre, okay for patient to go to unit without CM. Chart given to charge desk. Safety and comfort maintained while in ED.

## 2018-08-07 NOTE — CONSULT NOTE ADULT - SUBJECTIVE AND OBJECTIVE BOX
HPI:    Patient is a 78F with a history of migraines, occipital neuralgia, CAD, HTN, HLD, DM2, and vertebral artery stenosis s/p bilateral vertebral artery stents in June 2018 who presents due to pre-syncope.         79 y/o F with h/o migraines, occipital neuralgia s/p many different injections, CAD s/p multiple stents (2007, 2013, 2017) on DAPT, HTN, HLD, DM2, multiple back surgeries w/ collapsed lung remote hx, urinary incontinence, dysphagia, subclavian stenosis, s/p 2 stents placed in b/l vertebral arteries 6/14/18 presents with 11 episodes of lightheadedness, palpitations and SOB occurring intermittently since June 28th 2018. Pt states that the episodes occur with exertion never at rest, she has a list of events surrounding each episodes and it appears the episodes frequently happen while pt is entering or leaving either a store or her car. Pt denies diaphoresis, CP, back pain, abdo pain, n/v, HA, arm/neck/jaw pain, or syncope associated with these events, denies VILLA. Pt states she has had vertigo in the past but these episodes are different. She had one episode today when climbing the stairs at home and a family friend who is a nurse used a device to check her pulse and O2 sat- HR: 48, 96% on room air. She states she has had a pulse in the 40's previously and when in the hospital. Patient had another episode earlier today when she was walking to get her mail on flat ground. She states the trip was 2 car lengths each way and when she got to the door way on her way back she experienced dizziness and SOB which lasted a little over 2 minutes. Pt has no known h/o cardiac dysrhythmia, she has felt palpitations in the past but has never been symptomatic. Pt contacted her cardiologist, Dr. Gil, last week and was told to go to ER if sxs persist. She also contacted her neurosurgeon Dr. Jose Scott, who did not believe this problem was neurologic, but he scheduled pt for an oupt [facility] 8/15/18. Pt states she also has had increased swelling of her L leg for the past 2 weeks, has previously had orthopedic surgeries on the leg, was evaluated by her ortho provider with negative work-up, states this problem is chronic and intermittent. Pt denies fever, chills, recent illness, change in vision, numbness, paresthesias, weakness, change in gait, difficulty speaking/swallowing outside baseline. (07 Aug 2018 01:16)          MEDICATIONS  (STANDING):  amLODIPine   Tablet 10 milliGRAM(s) Oral daily  aspirin 325 milliGRAM(s) Oral daily  atorvastatin 80 milliGRAM(s) Oral at bedtime  clopidogrel Tablet 75 milliGRAM(s) Oral daily  dextrose 5%. 1000 milliLiter(s) (50 mL/Hr) IV Continuous <Continuous>  dextrose 50% Injectable 12.5 Gram(s) IV Push once  dextrose 50% Injectable 25 Gram(s) IV Push once  hydrochlorothiazide 12.5 milliGRAM(s) Oral daily  insulin lispro (HumaLOG) corrective regimen sliding scale   SubCutaneous three times a day before meals  insulin lispro (HumaLOG) corrective regimen sliding scale   SubCutaneous at bedtime  LORazepam   Injectable 1 milliGRAM(s) IV Push once    MEDICATIONS  (PRN):  dextrose 40% Gel 15 Gram(s) Oral once PRN Blood Glucose LESS THAN 70 milliGRAM(s)/deciliter    PAST MEDICAL & SURGICAL HISTORY:  Vertebral artery stenosis, unspecified laterality  Family history of coronary artery disease  Hypercholesteremia  Hypertension  MI (myocardial infarction): 2007  CAD (coronary artery disease): s/p stents x2-2007  Vertebral artery stenosis: s/p b/l stents 6/14/18  S/P lumpectomy of breast: x5-benign tumors  S/P cholecystectomy  H/O vaginal surgery: robotic-Nov 2012  H/O fall: 2.5 years ago multiple upper and lower injuries  Rib deformity: born with extra ribs, 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  No hearing loss, sneezing, congestion, runny nose or sore throat.  SKIN:  No rash or itching.  CARDIOVASCULAR:  No chest pain, chest pressure or chest discomfort. No palpitations or edema.  RESPIRATORY:  No shortness of breath, cough or sputum.  GASTROINTESTINAL:  No anorexia, nausea, vomiting or diarrhea. No abdominal pain.  GENITOURINARY:  NO dysuria. No increased frequency. No retention. No incontinence.  NEUROLOGICAL:  See HPI  MUSCULOSKELETAL:  No muscle, back pain, joint pain or stiffness.  HEMATOLOGIC:  No anemia, bleeding or bruising.  PSYCHIATRIC:    ENDOCRINOLOGIC:  No reports of sweating, cold or heat intolerance. No polyuria or polydipsia.        Vital Signs Last 24 Hrs  T(C): 36.6 (07 Aug 2018 12:30), Max: 37.2 (06 Aug 2018 22:25)  T(F): 97.8 (07 Aug 2018 12:30), Max: 98.9 (06 Aug 2018 22:25)  HR: 65 (07 Aug 2018 12:30) (57 - 78)  BP: 145/72 (07 Aug 2018 12:30) (145/72 - 157/72)  BP(mean): --  RR: 18 (07 Aug 2018 12:24) (17 - 18)  SpO2: 98% (07 Aug 2018 12:30) (97% - 98%)    General Exam:   General appearance: No acute distress    Cardiac:  Pulm:                 Neurological Exam:  Mental Status: Orientated to self, date and place.  Attention intact.  No dysarthria. Speech fluent.  Cranial Nerves:   PERRL, EOMI, VFF, no nystagmus.    CN V1-3 intact to light touch .  No facial asymmetry.  Hearing intact to finger rub bilaterally.  Tongue, uvula and palate midline.  Sternocleidomastoid and Trapezius intact bilaterally.    Motor:   Tone: normal.                  Strength:     [] Upper extremity                      Delt       Bicep    Tricep                                                  R         5/5        5/5        5/5       5/5                                               L          5/5        5/5        5/5       5/5  [] Lower extremity                       HF          KE          KF        DF         PF                                               R        5/5        5/5        5/5       5/5       5/5                                               L         5/5        5/5       5/5       5/5        5/5  Pronator drift: none                 Dysmetria: None to finger-nose-finger or heel-shin-heel  No truncal ataxia.    Tremor: No resting, postural or action tremor.  No myoclonus.    Sensation: intact to light touch, pinprick, vibration and proprioception    Deep Tendon Reflexes:     Biceps          Triceps      BR        Patellar        Ankle         Babinski                                         R       2+                   2+           2+            2+               2+           downgoing                                         L        2+                  2+           2+            2+               2+           downgoing    Gait: normal.      Other:    08-06    140  |  103  |  14  ----------------------------<  131<H>  3.6   |  24  |  0.72    Ca    9.3      06 Aug 2018 21:32  Phos  3.0     08-06  Mg     2.2     08-06    TPro  7.2  /  Alb  4.5  /  TBili  0.4  /  DBili  x   /  AST  21  /  ALT  17  /  AlkPhos  110  08-06                            14.3   6.4   )-----------( 166      ( 06 Aug 2018 21:32 )             42.5       Radiology    CT:   MRI  EKG:  tele:  TTE:  EEG: HPI:    Patient is a 78F with a history of migraines, occipital neuralgia, CAD, HTN, HLD, DM2, and vertebral artery stenosis s/p bilateral vertebral artery stents in June 2018 who presents due to pre-syncope. She had bilateral vertebral artery stents placed On 6/14/18 by Dr. Scott from neurosurg. Prior to that she was having episodes of dizziness/room spinning. Since the end of June, she has been experiencing multiple episodes of lightheadedness/presyncope which has recently become worse, prompting her to come to the ED. She denies any dizziness, headaches, changes in vision, numbness, tingling, focal weakness, or any other concerns.       MEDICATIONS  (STANDING):  amLODIPine   Tablet 10 milliGRAM(s) Oral daily  aspirin 325 milliGRAM(s) Oral daily  atorvastatin 80 milliGRAM(s) Oral at bedtime  clopidogrel Tablet 75 milliGRAM(s) Oral daily  dextrose 5%. 1000 milliLiter(s) (50 mL/Hr) IV Continuous <Continuous>  dextrose 50% Injectable 12.5 Gram(s) IV Push once  dextrose 50% Injectable 25 Gram(s) IV Push once  hydrochlorothiazide 12.5 milliGRAM(s) Oral daily  insulin lispro (HumaLOG) corrective regimen sliding scale   SubCutaneous three times a day before meals  insulin lispro (HumaLOG) corrective regimen sliding scale   SubCutaneous at bedtime  LORazepam   Injectable 1 milliGRAM(s) IV Push once    MEDICATIONS  (PRN):  dextrose 40% Gel 15 Gram(s) Oral once PRN Blood Glucose LESS THAN 70 milliGRAM(s)/deciliter    PAST MEDICAL & SURGICAL HISTORY:  Vertebral artery stenosis, unspecified laterality  Family history of coronary artery disease  Hypercholesteremia  Hypertension  MI (myocardial infarction): 2007  CAD (coronary artery disease): s/p stents x2-2007  Vertebral artery stenosis: s/p b/l stents 6/14/18  S/P lumpectomy of breast: x5-benign tumors  S/P cholecystectomy  H/O vaginal surgery: robotic-Nov 2012  H/O fall: 2.5 years ago multiple upper and lower injuries  Rib deformity: born with extra ribs, 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  No hearing loss, sneezing, congestion, runny nose or sore throat.  SKIN:  No rash or itching.  CARDIOVASCULAR:  No chest pain, chest pressure or chest discomfort. No palpitations or edema.  RESPIRATORY:  No shortness of breath, cough or sputum.  GASTROINTESTINAL:  No anorexia, nausea, vomiting or diarrhea. No abdominal pain.  GENITOURINARY:  NO dysuria. No increased frequency. No retention. No incontinence.  NEUROLOGICAL:  See HPI  MUSCULOSKELETAL:  No muscle, back pain, joint pain or stiffness.  HEMATOLOGIC:  No anemia, bleeding or bruising.  ENDOCRINOLOGIC:  No reports of sweating, cold or heat intolerance. No polyuria or polydipsia.        Vital Signs Last 24 Hrs  T(C): 36.6 (07 Aug 2018 12:30), Max: 37.2 (06 Aug 2018 22:25)  T(F): 97.8 (07 Aug 2018 12:30), Max: 98.9 (06 Aug 2018 22:25)  HR: 65 (07 Aug 2018 12:30) (57 - 78)  BP: 145/72 (07 Aug 2018 12:30) (145/72 - 157/72)  BP(mean): --  RR: 18 (07 Aug 2018 12:24) (17 - 18)  SpO2: 98% (07 Aug 2018 12:30) (97% - 98%)    General Exam:   General appearance: No acute distress             Neurological Exam:  Mental Status: Orientated to self, date and place.  Attention intact.  No dysarthria. Speech fluent.  Cranial Nerves:   PERRL, EOMI, VFF, no nystagmus.    CN V1-3 intact to light touch .  No facial asymmetry.  Hearing intact to finger rub bilaterally.  Tongue, uvula and palate midline.  Sternocleidomastoid and Trapezius intact bilaterally.    Motor:   Tone: normal.                  Strength:     [] Upper extremity                      Delt       Bicep    Tricep                                                  R         5/5        5/5        5/5       5/5                                               L          5/5        5/5        5/5       5/5  [] Lower extremity                       HF          KE          KF        DF         PF                                               R        5/5        5/5        5/5       5/5       5/5                                               L         5/5        5/5       5/5       5/5        5/5  Pronator drift: none                 Dysmetria: None to finger-nose-finger  Tremor: No resting    Sensation: intact to light touch    Deep Tendon Reflexes:     Biceps          Triceps      BR        Patellar        Ankle         Babinski                                         R       2+                   2+           2+            2+               2+           downgoing                                         L        2+                  2+           2+            2+               2+           downgoing    Gait: normal.        08-06    140  |  103  |  14  ----------------------------<  131<H>  3.6   |  24  |  0.72    Ca    9.3      06 Aug 2018 21:32  Phos  3.0     08-06  Mg     2.2     08-06    TPro  7.2  /  Alb  4.5  /  TBili  0.4  /  DBili  x   /  AST  21  /  ALT  17  /  AlkPhos  110  08-06                            14.3   6.4   )-----------( 166      ( 06 Aug 2018 21:32 )             42.5

## 2018-08-07 NOTE — H&P ADULT - NSHPPHYSICALEXAM_GEN_ALL_CORE
Vital Signs Last 24 Hrs  T(C): 36.6 (08-07-18 @ 01:15), Max: 37.2 (08-06-18 @ 22:25)  T(F): 97.9 (08-07-18 @ 01:15), Max: 98.9 (08-06-18 @ 22:25)  HR: 57 (08-07-18 @ 01:15) (57 - 78)  BP: 156/79 (08-07-18 @ 01:15) (148/81 - 157/72)  BP(mean): --  RR: 17 (08-07-18 @ 01:15) (17 - 18)  SpO2: 98% (08-07-18 @ 01:15) (97% - 98%)

## 2018-08-07 NOTE — H&P ADULT - PMH
CAD (coronary artery disease)  s/p stents x2-2007  Family history of coronary artery disease    Hypercholesteremia    Hypertension    MI (myocardial infarction)  2007  Vertebral artery stenosis, unspecified laterality

## 2018-08-07 NOTE — PHYSICAL THERAPY INITIAL EVALUATION ADULT - ACTIVE RANGE OF MOTION EXAMINATION, REHAB EVAL
Left UE Active ROM was WFL (within functional limits)/Right LE Active ROM was WFL (within functional limits)/Left LE Active ROM was WFL (within functional limits)/Right UE Active ROM was WFL (within functional limits)

## 2018-08-08 ENCOUNTER — APPOINTMENT (OUTPATIENT)
Dept: NEUROSURGERY | Facility: HOSPITAL | Age: 78
End: 2018-08-08

## 2018-08-08 LAB
ANION GAP SERPL CALC-SCNC: 14 MMOL/L — SIGNIFICANT CHANGE UP (ref 5–17)
BLD GP AB SCN SERPL QL: NEGATIVE — SIGNIFICANT CHANGE UP
BUN SERPL-MCNC: 16 MG/DL — SIGNIFICANT CHANGE UP (ref 7–23)
CALCIUM SERPL-MCNC: 9.3 MG/DL — SIGNIFICANT CHANGE UP (ref 8.4–10.5)
CHLORIDE SERPL-SCNC: 100 MMOL/L — SIGNIFICANT CHANGE UP (ref 96–108)
CO2 SERPL-SCNC: 24 MMOL/L — SIGNIFICANT CHANGE UP (ref 22–31)
CREAT SERPL-MCNC: 0.75 MG/DL — SIGNIFICANT CHANGE UP (ref 0.5–1.3)
GLUCOSE BLDC GLUCOMTR-MCNC: 114 MG/DL — HIGH (ref 70–99)
GLUCOSE BLDC GLUCOMTR-MCNC: 115 MG/DL — HIGH (ref 70–99)
GLUCOSE BLDC GLUCOMTR-MCNC: 188 MG/DL — HIGH (ref 70–99)
GLUCOSE SERPL-MCNC: 143 MG/DL — HIGH (ref 70–99)
HCT VFR BLD CALC: 41.7 % — SIGNIFICANT CHANGE UP (ref 34.5–45)
HGB BLD-MCNC: 14.2 G/DL — SIGNIFICANT CHANGE UP (ref 11.5–15.5)
MCHC RBC-ENTMCNC: 29.5 PG — SIGNIFICANT CHANGE UP (ref 27–34)
MCHC RBC-ENTMCNC: 34 GM/DL — SIGNIFICANT CHANGE UP (ref 32–36)
MCV RBC AUTO: 86.7 FL — SIGNIFICANT CHANGE UP (ref 80–100)
PLATELET # BLD AUTO: 174 K/UL — SIGNIFICANT CHANGE UP (ref 150–400)
POTASSIUM SERPL-MCNC: 4 MMOL/L — SIGNIFICANT CHANGE UP (ref 3.5–5.3)
POTASSIUM SERPL-SCNC: 4 MMOL/L — SIGNIFICANT CHANGE UP (ref 3.5–5.3)
RBC # BLD: 4.8 M/UL — SIGNIFICANT CHANGE UP (ref 3.8–5.2)
RBC # FLD: 12.2 % — SIGNIFICANT CHANGE UP (ref 10.3–14.5)
RH IG SCN BLD-IMP: POSITIVE — SIGNIFICANT CHANGE UP
SODIUM SERPL-SCNC: 138 MMOL/L — SIGNIFICANT CHANGE UP (ref 135–145)
WBC # BLD: 6.6 K/UL — SIGNIFICANT CHANGE UP (ref 3.8–10.5)
WBC # FLD AUTO: 6.6 K/UL — SIGNIFICANT CHANGE UP (ref 3.8–10.5)

## 2018-08-08 PROCEDURE — 36226 PLACE CATH VERTEBRAL ART: CPT | Mod: 50

## 2018-08-08 PROCEDURE — 99233 SBSQ HOSP IP/OBS HIGH 50: CPT

## 2018-08-08 PROCEDURE — 99223 1ST HOSP IP/OBS HIGH 75: CPT

## 2018-08-08 RX ADMIN — ATORVASTATIN CALCIUM 80 MILLIGRAM(S): 80 TABLET, FILM COATED ORAL at 21:14

## 2018-08-08 RX ADMIN — Medication 12.5 MILLIGRAM(S): at 05:10

## 2018-08-08 RX ADMIN — CLOPIDOGREL BISULFATE 75 MILLIGRAM(S): 75 TABLET, FILM COATED ORAL at 05:10

## 2018-08-08 RX ADMIN — Medication 325 MILLIGRAM(S): at 05:09

## 2018-08-08 RX ADMIN — AMLODIPINE BESYLATE 10 MILLIGRAM(S): 2.5 TABLET ORAL at 05:09

## 2018-08-08 NOTE — CONSULT NOTE ADULT - NSHPATTENDINGPLANDISCUSS_GEN_ALL_CORE
Medicine NP. To reach Cardiology Attending call [phone].
Neurology resident and medical students on stroke service

## 2018-08-08 NOTE — CONSULT NOTE ADULT - SUBJECTIVE AND OBJECTIVE BOX
HPI:  77 y/o F with h/o migraines, occipital neuralgia s/p many different injections, CAD s/p multiple stents (2007, 2013, 2017) on DAPT, HTN, HLD, DM2, multiple back surgeries w/ collapsed lung remote hx, urinary incontinence, dysphagia, subclavian stenosis, s/p 2 stents placed in b/l vertebral arteries 6/14/18 presents with 11 episodes of lightheadedness, palpitations and SOB occurring intermittently since June 28th 2018. Pt states that the episodes occur with exertion never at rest, she has a list of events surrounding each episodes and it appears the episodes frequently happen while pt is entering or leaving either a store or her car. Pt denies diaphoresis, CP, back pain, abdo pain, n/v, HA, arm/neck/jaw pain, or syncope associated with these events, denies VILLA. Pt states she has had vertigo in the past but these episodes are different. She had one episode today when climbing the stairs at home and a family friend who is a nurse used a device to check her pulse and O2 sat- HR: 48, 96% on room air. She states she has had a pulse in the 40's previously and when in the hospital. Patient had another episode earlier today when she was walking to get her mail on flat ground. She states the trip was 2 car lengths each way and when she got to the door way on her way back she experienced dizziness and SOB which lasted a little over 2 minutes. Pt has no known h/o cardiac dysrhythmia, she has felt palpitations in the past but has never been symptomatic. Pt contacted her cardiologist, Dr. Gil, last week and was told to go to ER if sxs persist. She also contacted her neurosurgeon Dr. Jose Scott, who did not believe this problem was neurologic, but he scheduled pt for an oupt [facility] 8/15/18. Pt states she also has had increased swelling of her L leg for the past 2 weeks, has previously had orthopedic surgeries on the leg, was evaluated by her ortho provider with negative work-up, states this problem is chronic and intermittent. Pt denies fever, chills, recent illness, change in vision, numbness, paresthesias, weakness, change in gait, difficulty speaking/swallowing outside baseline. (07 Aug 2018 01:16)    Returns with multiple symptoms, sudden dysequilibrium, weakness, need to hold on to something. Symptoms dissimilar from presentations leading to coronary stents. Has no chest pain or dyspnea.    PMH:   Vertebral artery stenosis, unspecified laterality  Family history of coronary artery disease  Hypercholesteremia  Hypertension  MI (myocardial infarction)  CAD (coronary artery disease)    PSH:   Vertebral artery stenosis  S/P lumpectomy of breast  S/P cholecystectomy  H/O vaginal surgery  H/O fall  Rib deformity    REVIEW OF SYSTEMS:  General: no fatigue/malaise, weight loss/gain.  Skin: no rashes.  Ophthalmologic: see HPI. 	  ENMT: no sore throat, rhinorrhea, sinus congestion.  Respiratory: no SOB, cough or wheeze.  Cardiovascular: no chest pain, dyspnea, palpitations, orthopnea or paroxysmal nocturnal dyspnea. No claudication.  Gastrointestinal:  no N/V/D, no melena/hematemesis/hematochezia.  Genitourinary: no dysuria/hesitancy or hematuria.  Musculoskeletal: no myalgias or arthralgias.  Neurological: sudden changes in vision, lightheadedness/dizziness, near syncope	  Psychiatric: appropriate.   Hematology/Lymphatics: no unusual bleeding, bruising and no lymphadenopathy.  Endocrine: no unusual thirst.   All others negative except as stated above and in HPI.     Medications:   amLODIPine   Tablet 10 milliGRAM(s) Oral daily  aspirin 325 milliGRAM(s) Oral daily  atorvastatin 80 milliGRAM(s) Oral at bedtime  clopidogrel Tablet 75 milliGRAM(s) Oral daily  dextrose 40% Gel 15 Gram(s) Oral once PRN  dextrose 5%. 1000 milliLiter(s) IV Continuous <Continuous>  dextrose 50% Injectable 12.5 Gram(s) IV Push once  dextrose 50% Injectable 25 Gram(s) IV Push once  hydrochlorothiazide 12.5 milliGRAM(s) Oral daily  insulin lispro (HumaLOG) corrective regimen sliding scale   SubCutaneous three times a day before meals  insulin lispro (HumaLOG) corrective regimen sliding scale   SubCutaneous at bedtime    Allergies:  Reglan (Other)    FAMILY HISTORY:  No pertinent family history in first degree relatives    Social History:  Smoking:  Alcohol:  Drugs:    Vital Signs Last 24 Hrs  T(C): 36.6 (08 Aug 2018 07:50), Max: 36.6 (07 Aug 2018 12:24)  T(F): 97.8 (08 Aug 2018 07:50), Max: 97.8 (07 Aug 2018 12:24)  HR: 57 (08 Aug 2018 07:50) (57 - 65)  BP: 149/57 (08 Aug 2018 07:50) (117/67 - 149/57)  BP(mean): --  RR: 18 (08 Aug 2018 07:50) (16 - 18)  SpO2: 97% (08 Aug 2018 07:50) (97% - 98%)    PHYSICAL EXAM:  Appearance: anxious, tearful after worrisome surgical news; generally well appearing, able to lie flat, breathing comfortably on RA  Eyes: PERRL, EOMI, pink conjunctiva  HENT: Normal oral mucosa  Respiratory: normal percussion without RR or W  Cardiovascular: apex non-displaced, RRR, S1 normal, S2 physiologic split, no murmurs, rubs, or gallops; no edema; no JVD  Gastrointestinal: soft, non-tender, non-distended with normal bowel sounds  Musculoskeletal: No clubbing; no joint deformity  Vascular: pulses symmetric and equal to DP/PT  Lymphatic: No lymphadenopathy  Skin: No rashes, stable ecchymoses, no cyanosis  Neurologic: Non-focal  Psychiatry: AAOx3, mood & affect appropriate.     Labs:                        14.2   6.6   )-----------( 174      ( 08 Aug 2018 05:25 )             41.7     08-08    138  |  100  |  16  ----------------------------<  143<H>  4.0   |  24  |  0.75    Ca    9.3      08 Aug 2018 05:25  Phos  3.0     08-06  Mg     2.2     08-06    TPro  7.2  /  Alb  4.5  /  TBili  0.4  /  DBili  x   /  AST  21  /  ALT  17  /  AlkPhos  110  08-06    PT/INR - ( 06 Aug 2018 21:32 )   PT: 10.1 sec;   INR: 0.93 ratio         PTT - ( 06 Aug 2018 21:32 )  PTT:30.6 sec      Cardiovascular Diagnostic Testing:  ECG: < from: 12 Lead ECG (08.06.18 @ 21:37) >  SINUS BRADYCARDIA WITH 1ST DEGREE A-V BLOCK  LEFT AXIS DEVIATION  LOW VOLTAGE QRS  SEPTAL INFARCT , AGE UNDETERMINED  POSSIBLE LATERAL INFARCT , AGE UNDETERMINED  ABNORMAL ECG    < end of copied text >    Interpretation of Telemetry: sinus, sinus bradycardia

## 2018-08-08 NOTE — CHART NOTE - NSCHARTNOTEFT_GEN_A_CORE
Attempted to see the patient at bedside but she was noted to be in the interventional suite for conventional angiogram.    Would re-attempt tomorrow.

## 2018-08-08 NOTE — PROGRESS NOTE ADULT - PROBLEM SELECTOR PLAN 1
Etiologies include: cerebrovascular complication of recent vertebral artery stenting vs. other vestibular dysfunction vs. doubt but must consider cardiogenic given patient with bradycardia and 1st degree AV block on EKG vs. dx of exclusion- possibly psychosomatic/panic disorder  - discussed at length with OP neurologist Dr. Ortiz's PA, Marii [phone], who states patient had severe symptoms of positional vertigo and vision loss found to be due to vertebral artery stenoses which were successfully stented last month. She experienced tremendous improvement in symptoms, but then developed lightheadedness for which she had some vestibular testing which was reportedly negative. Pt had planned NOVA study for next week with Dr. Scott  - f/u neurolog recs for any further vestibular/migraine workup  - pt medically optimized for neurosurgery angiogram today- pt to be on neurosurgery pending findings and/or interventions  - called Dr. Gil, who feels this is likely not cardiac- will discuss possibility of outpatient monitor  - seen by PT- no episodes, deemed to be safe for home  - continue monitoring on telemetry for now Etiologies include: cerebrovascular complication of recent vertebral artery stenting vs. other vestibular dysfunction vs. doubt but must consider cardiogenic given patient with bradycardia and 1st degree AV block on EKG vs. dx of exclusion- possibly psychosomatic/panic disorder  - discussed at length with OP neurologist Dr. Ortiz's PA, Marii [phone], who states patient had severe symptoms of positional vertigo and vision loss found to be due to vertebral artery stenoses which were successfully stented last month. She experienced tremendous improvement in symptoms, but then developed lightheadedness for which she had some vestibular testing which was reportedly negative. Pt had planned NOVA study for next week with Dr. Scott  - f/u neurolog recs for any further vestibular/migraine workup  - MRA showing decreased flow in L vertebral artery compared to previous- pt medically optimized for neurosurgery angiogram today- pt to be on neurosurgery pending findings and/or interventions  - called Dr. Gil, who feels this is likely not cardiac- will discuss possibility of outpatient monitor  - seen by PT- no episodes, deemed to be safe for home  - continue monitoring on telemetry for now

## 2018-08-08 NOTE — CHART NOTE - NSCHARTNOTEFT_GEN_A_CORE
Interventional Neuro Radiology  Pre-Procedure Note    This is a 77yo right hand dominant female with history of migraines, occipital neuralgia, s/p left vertebral artery angioplasty and stenting in 6/2018. Patient now presents with lightheadedness, palpitations and SOB occurring intermittently since June 28th 2018. Patient presents today to neuro IR for selective cerebral angiography for further evaluation.     Neuro Exam: Awake and alert, oriented x3, fluent, follows commands, Darshan    PAST MEDICAL & SURGICAL HISTORY:  Vertebral artery stenosis, unspecified laterality  Family history of coronary artery disease  Hypercholesteremia  Hypertension  MI (myocardial infarction): 2007  CAD (coronary artery disease): s/p stents x2-2007  Vertebral artery stenosis: s/p b/l stents 6/14/18  S/P lumpectomy of breast: x5-benign tumors  S/P cholecystectomy  H/O vaginal surgery: robotic-Nov 2012  H/O fall: 2.5 years ago multiple upper and lower injuries  Rib deformity: born with extra ribs, s/p removal     Social History:   Denies current tobacco use    FAMILY HISTORY:  No pertinent family history in first degree relatives    Allergies:   Reglan     Current Medications:   amLODIPine   Tablet 10 milliGRAM(s) Oral daily  aspirin 325 milliGRAM(s) Oral daily  atorvastatin 80 milliGRAM(s) Oral at bedtime  clopidogrel Tablet 75 milliGRAM(s) Oral daily  dextrose 40% Gel 15 Gram(s) Oral once PRN  dextrose 5%. 1000 milliLiter(s) IV Continuous <Continuous>  dextrose 50% Injectable 12.5 Gram(s) IV Push once  dextrose 50% Injectable 25 Gram(s) IV Push once  hydrochlorothiazide 12.5 milliGRAM(s) Oral daily  insulin lispro (HumaLOG) corrective regimen sliding scale   SubCutaneous three times a day before meals  insulin lispro (HumaLOG) corrective regimen sliding scale   SubCutaneous at bedtime      Labs:                         14.2   6.6   )-----------( 174      ( 08 Aug 2018 05:25 )             41.7       08-08    138  |  100  |  16  ----------------------------<  143<H>  4.0   |  24  |  0.75    Ca    9.3      08 Aug 2018 05:25  Phos  3.0     08-06  Mg     2.2     08-06    TPro  7.2  /  Alb  4.5  /  TBili  0.4  /  DBili  x   /  AST  21  /  ALT  17  /  AlkPhos  110  08-06      P2Y12= 28    Blood Bank: 08-08-18  A  --  Positive      Assessment/Plan:   This is a 77yo right hand dominant female presents with history of vertebral artery stenosis, s/p left vertebral artery stenting and angioplasty. . Patient presents to neuro-IR for selective cerebral angiography. Procedure/ risks/ benefits/ goals/ alternatives were explained. Risks include but are not limited to stroke/ vessel injury/ hemorrhage/ groin hematoma. All questions answered. Informed content obtained from patient, family at bedside. Consent placed in chart.    Gisele Magallon PA-C  [phone]

## 2018-08-08 NOTE — CONSULT NOTE ADULT - ASSESSMENT
78F s/p stenting for symptomatic VBI here now with pre syncopal episodes  - no acute neurosurigcal intervention  - MRI brain  - MRA NOVA  - Q4hr neruochecks  - ASA and Palvix cont
77 year old woman well known to me, long history of coronary disease, multiple percutaneous coronary interventions and ventricular function remains well preserved. Has never manifested congestive heart failure, has no significant valvular heart disease and stable rhythm. Her functional capacity has always been good except as limited by pain, spinal syndromes.    Coronary disease - maintain aspirin and clopidogrel.    continue highest dose statin    Hypertension - continue irbesartan and amlodipine    Neurosurgical testing, possible procedure this morning.
78F with history of vertebral artery and basilar stenosis s/p bilateral vertebral artery stents who presents with lightheadedness, presyncope that has been ongoing since the end of June, a couple weeks after she had her stents placed. She reports compliance with her aspirin and plavix.    Recommendations:  Continue aspirin and plavix daily  neurochecks q4h  follow-up results MRI, MRA NOVA  Obtain MRA head/neck with and without contrast to rule out in-stent thrombosis

## 2018-08-09 ENCOUNTER — TRANSCRIPTION ENCOUNTER (OUTPATIENT)
Age: 78
End: 2018-08-09

## 2018-08-09 DIAGNOSIS — R42 DIZZINESS AND GIDDINESS: ICD-10-CM

## 2018-08-09 LAB
ANION GAP SERPL CALC-SCNC: 12 MMOL/L — SIGNIFICANT CHANGE UP (ref 5–17)
APTT BLD: 27.1 SEC — LOW (ref 27.5–37.4)
BUN SERPL-MCNC: 16 MG/DL — SIGNIFICANT CHANGE UP (ref 7–23)
CALCIUM SERPL-MCNC: 9 MG/DL — SIGNIFICANT CHANGE UP (ref 8.4–10.5)
CHLORIDE SERPL-SCNC: 103 MMOL/L — SIGNIFICANT CHANGE UP (ref 96–108)
CO2 SERPL-SCNC: 25 MMOL/L — SIGNIFICANT CHANGE UP (ref 22–31)
CREAT SERPL-MCNC: 0.76 MG/DL — SIGNIFICANT CHANGE UP (ref 0.5–1.3)
GLUCOSE BLDC GLUCOMTR-MCNC: 117 MG/DL — HIGH (ref 70–99)
GLUCOSE BLDC GLUCOMTR-MCNC: 142 MG/DL — HIGH (ref 70–99)
GLUCOSE BLDC GLUCOMTR-MCNC: 154 MG/DL — HIGH (ref 70–99)
GLUCOSE BLDC GLUCOMTR-MCNC: 159 MG/DL — HIGH (ref 70–99)
GLUCOSE SERPL-MCNC: 137 MG/DL — HIGH (ref 70–99)
HCT VFR BLD CALC: 40 % — SIGNIFICANT CHANGE UP (ref 34.5–45)
HGB BLD-MCNC: 13.7 G/DL — SIGNIFICANT CHANGE UP (ref 11.5–15.5)
INR BLD: 0.97 RATIO — SIGNIFICANT CHANGE UP (ref 0.88–1.16)
MCHC RBC-ENTMCNC: 30.6 PG — SIGNIFICANT CHANGE UP (ref 27–34)
MCHC RBC-ENTMCNC: 34.3 GM/DL — SIGNIFICANT CHANGE UP (ref 32–36)
MCV RBC AUTO: 89.1 FL — SIGNIFICANT CHANGE UP (ref 80–100)
PLATELET # BLD AUTO: 158 K/UL — SIGNIFICANT CHANGE UP (ref 150–400)
POTASSIUM SERPL-MCNC: 4.2 MMOL/L — SIGNIFICANT CHANGE UP (ref 3.5–5.3)
POTASSIUM SERPL-SCNC: 4.2 MMOL/L — SIGNIFICANT CHANGE UP (ref 3.5–5.3)
PROTHROM AB SERPL-ACNC: 10.6 SEC — SIGNIFICANT CHANGE UP (ref 9.8–12.7)
RBC # BLD: 4.49 M/UL — SIGNIFICANT CHANGE UP (ref 3.8–5.2)
RBC # FLD: 12.7 % — SIGNIFICANT CHANGE UP (ref 10.3–14.5)
SODIUM SERPL-SCNC: 140 MMOL/L — SIGNIFICANT CHANGE UP (ref 135–145)
WBC # BLD: 6.6 K/UL — SIGNIFICANT CHANGE UP (ref 3.8–10.5)
WBC # FLD AUTO: 6.6 K/UL — SIGNIFICANT CHANGE UP (ref 3.8–10.5)

## 2018-08-09 PROCEDURE — 99223 1ST HOSP IP/OBS HIGH 75: CPT | Mod: GC

## 2018-08-09 PROCEDURE — 99233 SBSQ HOSP IP/OBS HIGH 50: CPT

## 2018-08-09 RX ORDER — ACETAMINOPHEN 500 MG
650 TABLET ORAL EVERY 6 HOURS
Qty: 0 | Refills: 0 | Status: DISCONTINUED | OUTPATIENT
Start: 2018-08-09 | End: 2018-08-10

## 2018-08-09 RX ORDER — ONDANSETRON 8 MG/1
4 TABLET, FILM COATED ORAL EVERY 6 HOURS
Qty: 0 | Refills: 0 | Status: DISCONTINUED | OUTPATIENT
Start: 2018-08-09 | End: 2018-08-10

## 2018-08-09 RX ORDER — MECLIZINE HCL 12.5 MG
25 TABLET ORAL EVERY 8 HOURS
Qty: 0 | Refills: 0 | Status: DISCONTINUED | OUTPATIENT
Start: 2018-08-09 | End: 2018-08-10

## 2018-08-09 RX ORDER — HEPARIN SODIUM 5000 [USP'U]/ML
5000 INJECTION INTRAVENOUS; SUBCUTANEOUS EVERY 12 HOURS
Qty: 0 | Refills: 0 | Status: DISCONTINUED | OUTPATIENT
Start: 2018-08-09 | End: 2018-08-10

## 2018-08-09 RX ORDER — OXYCODONE AND ACETAMINOPHEN 5; 325 MG/1; MG/1
1 TABLET ORAL ONCE
Qty: 0 | Refills: 0 | Status: DISCONTINUED | OUTPATIENT
Start: 2018-08-09 | End: 2018-08-10

## 2018-08-09 RX ADMIN — Medication 25 MILLIGRAM(S): at 18:05

## 2018-08-09 RX ADMIN — Medication 25 MILLIGRAM(S): at 10:24

## 2018-08-09 RX ADMIN — Medication 325 MILLIGRAM(S): at 05:26

## 2018-08-09 RX ADMIN — Medication 650 MILLIGRAM(S): at 01:47

## 2018-08-09 RX ADMIN — ONDANSETRON 4 MILLIGRAM(S): 8 TABLET, FILM COATED ORAL at 02:13

## 2018-08-09 RX ADMIN — Medication 650 MILLIGRAM(S): at 01:09

## 2018-08-09 RX ADMIN — AMLODIPINE BESYLATE 10 MILLIGRAM(S): 2.5 TABLET ORAL at 05:26

## 2018-08-09 RX ADMIN — Medication 12.5 MILLIGRAM(S): at 05:27

## 2018-08-09 RX ADMIN — CLOPIDOGREL BISULFATE 75 MILLIGRAM(S): 75 TABLET, FILM COATED ORAL at 05:27

## 2018-08-09 NOTE — DISCHARGE NOTE ADULT - PATIENT PORTAL LINK FT
You can access the [facility] Patient Portal, offered by [facility], by registering with the following website: [email or web address]

## 2018-08-09 NOTE — DISCHARGE NOTE ADULT - MEDICATION SUMMARY - MEDICATIONS TO TAKE
I will START or STAY ON the medications listed below when I get home from the hospital:    frovatriptan 2.5 mg oral tablet  -- 1 tab(s) by mouth , As Needed at headache onset and repeat at max 48 3 tab  -- Indication: For migraine    traMADol 50 mg oral tablet  -- 1 tab(s) by mouth every 6 hours, As needed, Severe Pain (7 - 10)  -- Indication: For [place]    aspirin 325 mg oral delayed release tablet  -- 1 tab(s) by mouth   -- Indication: For Cad    metFORMIN 500 mg oral tablet  -- 1 tab(s) by mouth once a day  may restart 11/11/17 am  -- Indication: For diabetes    meclizine 25 mg oral tablet  -- 1 tab(s) by mouth every 6 hours, As Needed -Dizziness   -- Indication: For Vertigo    Crestor 40 mg oral tablet  -- 1 tab(s) by mouth once a day (at bedtime)  -- Indication: For Hyperlipdemia    clopidogrel 75 mg oral tablet  -- 1 tab(s) by mouth once a day  -- Indication: For Cad    amLODIPine 10 mg oral tablet  -- 1 tab(s) by mouth once a day  -- Indication: For Hypertension    omeprazole 40 mg oral delayed release capsule  -- 1 cap(s) by mouth once a day  -- Indication: For acid reflux    multivitamin  -- Indication: For Vitamins    cyanocobalamin 500 mcg oral tablet  -- Indication: For Vitamins

## 2018-08-09 NOTE — DISCHARGE NOTE ADULT - CARE PLAN
Principal Discharge DX:	Lightheadedness  Goal:	to be free from fall  Assessment and plan of treatment:	Cerebral angio - patent  Secondary Diagnosis:	Essential hypertension  Goal:	Your blood pressure will be controlled.  Assessment and plan of treatment:	Continue with your blood pressure medications; eat a heart healthy diet with low salt diet; exercise regularly (consult with your physician or cardiologist first); maintain a heart healthy weight; if you smoke - quit (A resource to help you stop smoking is the [facility] – phone number [phone].); include healthy ways to manage stress. Continue to follow with your primary care physician or cardiologist.

## 2018-08-09 NOTE — DISCHARGE NOTE ADULT - ABILITY TO HEAR (WITH HEARING AID OR HEARING APPLIANCE IF NORMALLY USED):
wear hearing aids/Mildly to Moderately Impaired: difficulty hearing in some environments or speaker may need to increase volume or speak distinctly

## 2018-08-09 NOTE — DISCHARGE NOTE ADULT - PLAN OF CARE
to be free from fall Cerebral angio - patent Your blood pressure will be controlled. Continue with your blood pressure medications; eat a heart healthy diet with low salt diet; exercise regularly (consult with your physician or cardiologist first); maintain a heart healthy weight; if you smoke - quit (A resource to help you stop smoking is the [facility] – phone number [phone].); include healthy ways to manage stress. Continue to follow with your primary care physician or cardiologist.

## 2018-08-09 NOTE — PROGRESS NOTE ADULT - PROBLEM SELECTOR PLAN 1
Now with vertigo s/p cerebral angiogram. No focal neuro findings. Low suspicion for vertebral artery dissection however must r/o given recent angiogram.  - repeat MRI/MRA w/wo contrast as per vascular neurology team  - meclizine 25mg po prn for vertigo  - neurology recommending vestibular rehab and outpatient ENT evaluation for inner ear pathology  - Dr. Gil to consider outpatient loop recorder if inpatient workup negative

## 2018-08-09 NOTE — DISCHARGE NOTE ADULT - HOSPITAL COURSE
77 y/o F with h/o migraines, occipital neuralgia s/p many different injections, CAD s/p multiple stents (2007, 2013, 2017) on DAPT, HTN, HLD, DM2, multiple back surgeries w/ collapsed lung remote hx, urinary incontinence, dysphagia, subclavian stenosis, s/p 2 stents placed in b/l vertebral arteries 6/14/18 presents with 11 episodes of lightheadedness, palpitations and SOB occurring intermittently since June 28th 2018. Pt states that the episodes occur with exertion never at rest, she has a list of events surrounding each episodes and it appears the episodes frequently happen while pt is entering or leaving either a store or her car. Pt denies diaphoresis, CP, back pain, abdo pain, n/v, HA, arm/neck/jaw pain, or syncope associated with these events, denies VILLA. Pt states she has had vertigo in the past but these episodes are different. She had one episode today when climbing the stairs at home and a family friend who is a nurse used a device to check her pulse and O2 sat- HR: 48, 96% on room air. She states she has had a pulse in the 40's previously and when in the hospital. Patient had another episode earlier today when she was walking to get her mail on flat ground. She states the trip was 2 car lengths each way and when she got to the door way on her way back she experienced dizziness and SOB which lasted a little over 2 minutes. Pt has no known h/o cardiac dysrhythmia, she has felt palpitations in the past but has never been symptomatic. Pt contacted her cardiologist, Dr. Gil, last week and was told to go to ER if sxs persist. She also contacted her neurosurgeon Dr. Jose Scott, who did not believe this problem was neurologic, but he scheduled pt for an oupt NOVA 8/15/18. Pt states she also has had increased swelling of her L leg for the past 2 weeks, has previously had orthopedic surgeries on the leg, was evaluated by her ortho provider with negative work-up, states this problem is chronic and intermittent. Pt denies fever, chills, recent illness, change in vision, numbness, paresthesias, weakness, change in gait, difficulty speaking/swallowing outside baseline.  Pt s/p cerebral angiogram via R groin by neurosurgeon Dr. Scott. Pt tolerated procedure well. No c/o chest pain or SOB. Pt do c/o lightheaded with dizziness last night, Tylenals and Zofran given with good response, s/s subsided, and informed neuro resident Dr Silver # [phone]. Insertion/incision site benign, no bleeding or hematoma, and R groin site dressing changed.

## 2018-08-09 NOTE — DISCHARGE NOTE ADULT - CARE PROVIDERS DIRECT ADDRESSES
[email or web address]

## 2018-08-09 NOTE — DISCHARGE NOTE ADULT - ADDITIONAL INSTRUCTIONS
f/u with Dr Ortiz neurologist plan for vestibular rehab,  f/u with Dr Gil cardiologist for a possible  loop recorder   f/u with ENT   f/u with primary care physician

## 2018-08-09 NOTE — DISCHARGE NOTE ADULT - CARE PROVIDER_API CALL
Hiram Gil (MD), Cardiovascular Disease; Internal Medicine; Nuclear Cardiology  [address] [facility] [address]  [place] [address]  Phone: [phone]  Fax: [phone]    kp tran  Phone: [phone]  Fax: (       -

## 2018-08-09 NOTE — CHART NOTE - NSCHARTNOTEFT_GEN_A_CORE
Patient with no in stent stenosis of vertebral stent. No further neurosurgical intervention anticipated at this time. Cont care as per primary team

## 2018-08-10 VITALS
HEART RATE: 62 BPM | TEMPERATURE: 98 F | RESPIRATION RATE: 18 BRPM | DIASTOLIC BLOOD PRESSURE: 54 MMHG | SYSTOLIC BLOOD PRESSURE: 121 MMHG | OXYGEN SATURATION: 95 %

## 2018-08-10 LAB
ANION GAP SERPL CALC-SCNC: 10 MMOL/L — SIGNIFICANT CHANGE UP (ref 5–17)
BUN SERPL-MCNC: 23 MG/DL — SIGNIFICANT CHANGE UP (ref 7–23)
CALCIUM SERPL-MCNC: 8.9 MG/DL — SIGNIFICANT CHANGE UP (ref 8.4–10.5)
CHLORIDE SERPL-SCNC: 100 MMOL/L — SIGNIFICANT CHANGE UP (ref 96–108)
CO2 SERPL-SCNC: 24 MMOL/L — SIGNIFICANT CHANGE UP (ref 22–31)
CREAT SERPL-MCNC: 0.92 MG/DL — SIGNIFICANT CHANGE UP (ref 0.5–1.3)
GLUCOSE BLDC GLUCOMTR-MCNC: 102 MG/DL — HIGH (ref 70–99)
GLUCOSE BLDC GLUCOMTR-MCNC: 141 MG/DL — HIGH (ref 70–99)
GLUCOSE SERPL-MCNC: 149 MG/DL — HIGH (ref 70–99)
HCT VFR BLD CALC: 43.5 % — SIGNIFICANT CHANGE UP (ref 34.5–45)
HGB BLD-MCNC: 14.1 G/DL — SIGNIFICANT CHANGE UP (ref 11.5–15.5)
MCHC RBC-ENTMCNC: 29.1 PG — SIGNIFICANT CHANGE UP (ref 27–34)
MCHC RBC-ENTMCNC: 32.4 GM/DL — SIGNIFICANT CHANGE UP (ref 32–36)
MCV RBC AUTO: 90 FL — SIGNIFICANT CHANGE UP (ref 80–100)
PLATELET # BLD AUTO: 164 K/UL — SIGNIFICANT CHANGE UP (ref 150–400)
POTASSIUM SERPL-MCNC: 3.8 MMOL/L — SIGNIFICANT CHANGE UP (ref 3.5–5.3)
POTASSIUM SERPL-SCNC: 3.8 MMOL/L — SIGNIFICANT CHANGE UP (ref 3.5–5.3)
RBC # BLD: 4.83 M/UL — SIGNIFICANT CHANGE UP (ref 3.8–5.2)
RBC # FLD: 13.3 % — SIGNIFICANT CHANGE UP (ref 10.3–14.5)
SODIUM SERPL-SCNC: 134 MMOL/L — LOW (ref 135–145)
WBC # BLD: 5.9 K/UL — SIGNIFICANT CHANGE UP (ref 3.8–10.5)
WBC # FLD AUTO: 5.9 K/UL — SIGNIFICANT CHANGE UP (ref 3.8–10.5)

## 2018-08-10 PROCEDURE — 80048 BASIC METABOLIC PNL TOTAL CA: CPT

## 2018-08-10 PROCEDURE — 85027 COMPLETE CBC AUTOMATED: CPT

## 2018-08-10 PROCEDURE — 86901 BLOOD TYPING SEROLOGIC RH(D): CPT

## 2018-08-10 PROCEDURE — 36415 COLL VENOUS BLD VENIPUNCTURE: CPT

## 2018-08-10 PROCEDURE — 83735 ASSAY OF MAGNESIUM: CPT

## 2018-08-10 PROCEDURE — 85610 PROTHROMBIN TIME: CPT

## 2018-08-10 PROCEDURE — 99239 HOSP IP/OBS DSCHRG MGMT >30: CPT

## 2018-08-10 PROCEDURE — 99233 SBSQ HOSP IP/OBS HIGH 50: CPT

## 2018-08-10 PROCEDURE — 84100 ASSAY OF PHOSPHORUS: CPT

## 2018-08-10 PROCEDURE — 70544 MR ANGIOGRAPHY HEAD W/O DYE: CPT

## 2018-08-10 PROCEDURE — 83605 ASSAY OF LACTIC ACID: CPT

## 2018-08-10 PROCEDURE — 85014 HEMATOCRIT: CPT

## 2018-08-10 PROCEDURE — 86850 RBC ANTIBODY SCREEN: CPT

## 2018-08-10 PROCEDURE — 86900 BLOOD TYPING SEROLOGIC ABO: CPT

## 2018-08-10 PROCEDURE — 70551 MRI BRAIN STEM W/O DYE: CPT

## 2018-08-10 PROCEDURE — 97161 PT EVAL LOW COMPLEX 20 MIN: CPT

## 2018-08-10 PROCEDURE — 97116 GAIT TRAINING THERAPY: CPT

## 2018-08-10 PROCEDURE — 82947 ASSAY GLUCOSE BLOOD QUANT: CPT

## 2018-08-10 PROCEDURE — 82435 ASSAY OF BLOOD CHLORIDE: CPT

## 2018-08-10 PROCEDURE — 80053 COMPREHEN METABOLIC PANEL: CPT

## 2018-08-10 PROCEDURE — 97530 THERAPEUTIC ACTIVITIES: CPT

## 2018-08-10 PROCEDURE — 93005 ELECTROCARDIOGRAM TRACING: CPT

## 2018-08-10 PROCEDURE — 36226 PLACE CATH VERTEBRAL ART: CPT

## 2018-08-10 PROCEDURE — 99285 EMERGENCY DEPT VISIT HI MDM: CPT | Mod: 25

## 2018-08-10 PROCEDURE — 82330 ASSAY OF CALCIUM: CPT

## 2018-08-10 PROCEDURE — 82962 GLUCOSE BLOOD TEST: CPT

## 2018-08-10 PROCEDURE — C1887: CPT

## 2018-08-10 PROCEDURE — 71045 X-RAY EXAM CHEST 1 VIEW: CPT

## 2018-08-10 PROCEDURE — 85576 BLOOD PLATELET AGGREGATION: CPT

## 2018-08-10 PROCEDURE — 84484 ASSAY OF TROPONIN QUANT: CPT

## 2018-08-10 PROCEDURE — C1769: CPT

## 2018-08-10 PROCEDURE — 85730 THROMBOPLASTIN TIME PARTIAL: CPT

## 2018-08-10 PROCEDURE — 84295 ASSAY OF SERUM SODIUM: CPT

## 2018-08-10 PROCEDURE — 82803 BLOOD GASES ANY COMBINATION: CPT

## 2018-08-10 PROCEDURE — C1894: CPT

## 2018-08-10 PROCEDURE — 84132 ASSAY OF SERUM POTASSIUM: CPT

## 2018-08-10 RX ORDER — MECLIZINE HCL 12.5 MG
1 TABLET ORAL
Qty: 120 | Refills: 3 | OUTPATIENT
Start: 2018-08-10 | End: 2018-12-07

## 2018-08-10 RX ORDER — FROVATRIPTAN SUCCINATE 2.5 MG/1
1 TABLET, FILM COATED ORAL
Qty: 0 | Refills: 0 | COMMUNITY

## 2018-08-10 RX ORDER — POTASSIUM CHLORIDE 20 MEQ
20 PACKET (EA) ORAL ONCE
Qty: 0 | Refills: 0 | Status: COMPLETED | OUTPATIENT
Start: 2018-08-10 | End: 2018-08-10

## 2018-08-10 RX ORDER — METFORMIN HYDROCHLORIDE 850 MG/1
1 TABLET ORAL
Qty: 0 | Refills: 0 | COMMUNITY

## 2018-08-10 RX ADMIN — Medication 25 MILLIGRAM(S): at 04:12

## 2018-08-10 RX ADMIN — AMLODIPINE BESYLATE 10 MILLIGRAM(S): 2.5 TABLET ORAL at 06:31

## 2018-08-10 RX ADMIN — Medication 12.5 MILLIGRAM(S): at 06:32

## 2018-08-10 RX ADMIN — HEPARIN SODIUM 5000 UNIT(S): 5000 INJECTION INTRAVENOUS; SUBCUTANEOUS at 05:26

## 2018-08-10 RX ADMIN — Medication 325 MILLIGRAM(S): at 06:31

## 2018-08-10 RX ADMIN — Medication 20 MILLIEQUIVALENT(S): at 06:28

## 2018-08-10 NOTE — PROGRESS NOTE ADULT - SUBJECTIVE AND OBJECTIVE BOX
HPI:  Feeling better this morning, much less vertiginous, no chest pain, dyspnea or palpitations. Needs to ambulate.    Medications:  acetaminophen   Tablet. 650 milliGRAM(s) Oral every 6 hours PRN  amLODIPine   Tablet 10 milliGRAM(s) Oral daily  aspirin 325 milliGRAM(s) Oral daily  atorvastatin 80 milliGRAM(s) Oral at bedtime  clopidogrel Tablet 75 milliGRAM(s) Oral daily  dextrose 40% Gel 15 Gram(s) Oral once PRN  dextrose 5%. 1000 milliLiter(s) IV Continuous <Continuous>  dextrose 50% Injectable 12.5 Gram(s) IV Push once  dextrose 50% Injectable 25 Gram(s) IV Push once  heparin  Injectable 5000 Unit(s) SubCutaneous every 12 hours  hydrochlorothiazide 12.5 milliGRAM(s) Oral daily  insulin lispro (HumaLOG) corrective regimen sliding scale   SubCutaneous three times a day before meals  insulin lispro (HumaLOG) corrective regimen sliding scale   SubCutaneous at bedtime  meclizine 25 milliGRAM(s) Oral every 8 hours PRN  ondansetron Injectable 4 milliGRAM(s) IV Push every 6 hours PRN  oxyCODONE    5 mG/acetaminophen 325 mG 1 Tablet(s) Oral once    PMH/PSH/FH/SH:  Unchanged    ROS:  General: malaise improved  Skin: no rashes.  Ophthalmologic: no visual disturbance 	  ENMT: no sore throat, rhinorrhea, sinus congestion.  Respiratory: no SOB, cough or wheeze.  Cardiovascular: no chest pain, dyspnea, palpitations, orthopnea or paroxysmal nocturnal dyspnea. No claudication.  Gastrointestinal:  nausea, but vomiting, no melena/hematemesis/hematochezia.  Genitourinary: no dysuria/hesitancy or hematuria.  Musculoskeletal: no myalgias or arthralgias.  Neurological: vertigo improved, no recurrence of near syncope	  Psychiatric: appropriate.   Hematology/Lymphatics: no unusual bleeding, bruising and no lymphadenopathy.  Endocrine: no unusual thirst.   All others negative except as stated above and in HPI.     Vitals:  T(C): 36.8 (08-10-18 @ 04:10), Max: 36.8 (08-10-18 @ 04:10)  HR: 66 (08-10-18 @ 04:10) (53 - 66)  BP: 142/74 (08-10-18 @ 04:10) (111/52 - 150/68)  BP(mean): --  RR: 17 (08-10-18 @ 04:10) (16 - 18)  SpO2: 97% (08-10-18 @ 04:10) (97% - 97%)  Wt(kg): --  Daily     Daily     Physical exam:  Appearance: appearing ill and severely uncomfortable, breathing comfortably on RA  Eyes: PERRL, EOMI, pink conjunctiva  HENT: Normal oral mucosa  Respiratory: normal percussion without RR or W  Cardiovascular: apex non-displaced, RRR, S1 normal, S2 physiologic split, gr ii/vi early peaking short systolic murmur base, no rubs, or gallops; no edema; no JVD  Gastrointestinal: soft, non-tender, non-distended with normal bowel sounds  Musculoskeletal: No clubbing; no joint deformity  Vascular: pulses symmetric and equal to DP/PT  Lymphatic: No lymphadenopathy  Skin: No rashes, stable ecchymoses, no cyanosis  Neurologic: Non-focal  Psychiatry: AAOx3, mood & affect appropriate.       I&O's Summary    09 Aug 2018 07:01  -  10 Aug 2018 07:00  --------------------------------------------------------  IN: 1020 mL / OUT: 0 mL / NET: 1020 mL                              14.1   5.9   )-----------( 164      ( 10 Aug 2018 04:59 )             43.5     08-10    134<L>  |  100  |  23  ----------------------------<  149<H>  3.8   |  24  |  0.92    Ca    8.9      10 Aug 2018 04:59      PT/INR - ( 09 Aug 2018 05:36 )   PT: 10.6 sec;   INR: 0.97 ratio         PTT - ( 09 Aug 2018 05:36 )  PTT:27.1 sec      Interpretation of Telemetry: sinus rhythm, sinus bradycardia during sleep, 1st degree heart block, no high grade heart block, no pauses, no ectopy.
HPI:  Severe vertigo since completion of procedure yesterday which indicated vertebral artery flow unimpaired, stent patent. Has no chest pain or dyspnea. No palpitations.    Medications:  acetaminophen   Tablet. 650 milliGRAM(s) Oral every 6 hours PRN  amLODIPine   Tablet 10 milliGRAM(s) Oral daily  aspirin 325 milliGRAM(s) Oral daily  atorvastatin 80 milliGRAM(s) Oral at bedtime  clopidogrel Tablet 75 milliGRAM(s) Oral daily  dextrose 40% Gel 15 Gram(s) Oral once PRN  dextrose 5%. 1000 milliLiter(s) IV Continuous <Continuous>  dextrose 50% Injectable 12.5 Gram(s) IV Push once  dextrose 50% Injectable 25 Gram(s) IV Push once  hydrochlorothiazide 12.5 milliGRAM(s) Oral daily  insulin lispro (HumaLOG) corrective regimen sliding scale   SubCutaneous three times a day before meals  insulin lispro (HumaLOG) corrective regimen sliding scale   SubCutaneous at bedtime  ondansetron Injectable 4 milliGRAM(s) IV Push every 6 hours PRN  oxyCODONE    5 mG/acetaminophen 325 mG 1 Tablet(s) Oral once    PMH/PSH/FH/SH:  Unchanged    ROS:  General: malaise  Skin: no rashes.  Ophthalmologic: no visual disturbance 	  ENMT: no sore throat, rhinorrhea, sinus congestion.  Respiratory: no SOB, cough or wheeze.  Cardiovascular: no chest pain, dyspnea, palpitations, orthopnea or paroxysmal nocturnal dyspnea. No claudication.  Gastrointestinal:  nausea, but vomiting, no melena/hematemesis/hematochezia.  Genitourinary: no dysuria/hesitancy or hematuria.  Musculoskeletal: no myalgias or arthralgias.  Neurological: vertigo, no recurrence of near syncope	  Psychiatric: appropriate.   Hematology/Lymphatics: no unusual bleeding, bruising and no lymphadenopathy.  Endocrine: no unusual thirst.   All others negative except as stated above and in HPI.     Vitals:  T(C): 36.6 (08-09-18 @ 05:21), Max: 36.6 (08-09-18 @ 05:21)  HR: 51 (08-09-18 @ 05:21) (51 - 58)  BP: 117/50 (08-09-18 @ 05:21) (115/58 - 137/62)  BP(mean): --  RR: 17 (08-09-18 @ 05:21) (16 - 18)  SpO2: 97% (08-09-18 @ 05:21) (97% - 98%)  Wt(kg): --  Daily     Daily     Physical exam:  Appearance: appearing ill and severely uncomfortable, breathing comfortably on RA  Eyes: PERRL, EOMI, pink conjunctiva  HENT: Normal oral mucosa  Respiratory: normal percussion without RR or W  Cardiovascular: apex non-displaced, RRR, S1 normal, S2 physiologic split, no murmurs, rubs, or gallops; no edema; no JVD  Gastrointestinal: soft, non-tender, non-distended with normal bowel sounds  Musculoskeletal: No clubbing; no joint deformity  Vascular: pulses symmetric and equal to DP/PT  Lymphatic: No lymphadenopathy  Skin: No rashes, stable ecchymoses, no cyanosis  Neurologic: Non-focal  Psychiatry: AAOx3, mood & affect appropriate.     I&O's Summary    08 Aug 2018 07:01  -  09 Aug 2018 07:00  --------------------------------------------------------  IN: 180 mL / OUT: 600 mL / NET: -420 mL    09 Aug 2018 07:01  -  09 Aug 2018 08:55  --------------------------------------------------------  IN: 120 mL / OUT: 0 mL / NET: 120 mL                              13.7   6.6   )-----------( 158      ( 09 Aug 2018 05:36 )             40.0     08-09    140  |  103  |  16  ----------------------------<  137<H>  4.2   |  25  |  0.76    Ca    9.0      09 Aug 2018 05:36      PT/INR - ( 09 Aug 2018 05:36 )   PT: 10.6 sec;   INR: 0.97 ratio         PTT - ( 09 Aug 2018 05:36 )  PTT:27.1 sec      Interpretation of Telemetry: sinus rhythm, sinus bradycardia, no ectopy, no heart block, no pauses
Patient is a 78y old  Female who presents with a chief complaint of Lightheadedness (09 Aug 2018 05:45)  Subjective: pt reports ongoing intractable vertigo while she was on neurosurgery table at the end of her angiogram. Unable to move her head without significant vertigo, no visual problems, no LOC/fall, no CP, palpitations.     VITAL SIGNS:  Vital Signs Last 24 Hrs  T(C): 36.6 (09 Aug 2018 12:35), Max: 36.6 (09 Aug 2018 05:21)  T(F): 97.9 (09 Aug 2018 12:35), Max: 97.9 (09 Aug 2018 12:35)  HR: 53 (09 Aug 2018 12:35) (51 - 56)  BP: 111/52 (09 Aug 2018 12:35) (111/52 - 150/68)  BP(mean): --  RR: 18 (09 Aug 2018 12:35) (16 - 18)  SpO2: 97% (09 Aug 2018 12:35) (97% - 98%)    PHYSICAL EXAM:     GENERAL: +moderate distress from vertigo  HEENT: PERRLA, EOMI, moist oropharynx   RESPIRATORY: CTAB, no w/c   CARDIOVASCULAR: RRR, no murmurs, gallops, rubs  ABDOMINAL: soft, non-tender, non-distended, positive bowel sounds   EXTREMITIES: no clubbing, cyanosis, or edema  NEUROLOGICAL: alert and oriented x 3, no nystagmus, non-focal motor and sensory exams  SKIN: no rashes or lesions   MUSCULOSKELETAL: no gross joint deformity                          13.7   6.6   )-----------( 158      ( 09 Aug 2018 05:36 )             40.0     08-09    140  |  103  |  16  ----------------------------<  137<H>  4.2   |  25  |  0.76    Ca    9.0      09 Aug 2018 05:36        CAPILLARY BLOOD GLUCOSE      POCT Blood Glucose.: 117 mg/dL (09 Aug 2018 17:36)  POCT Blood Glucose.: 142 mg/dL (09 Aug 2018 11:41)  POCT Blood Glucose.: 159 mg/dL (09 Aug 2018 07:22)  POCT Blood Glucose.: 188 mg/dL (08 Aug 2018 19:48)      MEDICATIONS  (STANDING):  amLODIPine   Tablet 10 milliGRAM(s) Oral daily  aspirin 325 milliGRAM(s) Oral daily  atorvastatin 80 milliGRAM(s) Oral at bedtime  clopidogrel Tablet 75 milliGRAM(s) Oral daily  dextrose 5%. 1000 milliLiter(s) (50 mL/Hr) IV Continuous <Continuous>  dextrose 50% Injectable 12.5 Gram(s) IV Push once  dextrose 50% Injectable 25 Gram(s) IV Push once  hydrochlorothiazide 12.5 milliGRAM(s) Oral daily  insulin lispro (HumaLOG) corrective regimen sliding scale   SubCutaneous three times a day before meals  insulin lispro (HumaLOG) corrective regimen sliding scale   SubCutaneous at bedtime  oxyCODONE    5 mG/acetaminophen 325 mG 1 Tablet(s) Oral once    MEDICATIONS  (PRN):  acetaminophen   Tablet. 650 milliGRAM(s) Oral every 6 hours PRN Mild Pain (1 - 3)  dextrose 40% Gel 15 Gram(s) Oral once PRN Blood Glucose LESS THAN 70 milliGRAM(s)/deciliter  meclizine 25 milliGRAM(s) Oral every 8 hours PRN Dizziness  ondansetron Injectable 4 milliGRAM(s) IV Push every 6 hours PRN Nausea and/or Vomiting
Patient seen and examined at bedside.    T(C): 36.6 (08-08-18 @ 04:05), Max: 36.6 (08-07-18 @ 12:24)  HR: 58 (08-08-18 @ 04:05) (58 - 69)  BP: 147/70 (08-08-18 @ 04:05) (117/67 - 149/61)  RR: 17 (08-08-18 @ 04:05) (16 - 18)  SpO2: 97% (08-08-18 @ 04:05) (97% - 98%)  Wt(kg): --    Exam:
Patient is a 78y old  Female who presents with a chief complaint of Lightheadedness (09 Aug 2018 05:45)  Subjective: Pt reports significant improvement in vertigo symptoms with meclizine. Eager to go home.    VITAL SIGNS:  Vital Signs Last 24 Hrs  T(C): 36.8 (10 Aug 2018 04:10), Max: 36.8 (10 Aug 2018 04:10)  T(F): 98.2 (10 Aug 2018 04:10), Max: 98.2 (10 Aug 2018 04:10)  HR: 60 (10 Aug 2018 09:41) (56 - 66)  BP: 137/58 (10 Aug 2018 09:41) (112/56 - 142/74)  BP(mean): 74 (10 Aug 2018 09:41) (74 - 74)  RR: 17 (10 Aug 2018 04:10) (16 - 17)  SpO2: 97% (10 Aug 2018 04:10) (97% - 97%)      PHYSICAL EXAM:     GENERAL: no acute distress  HEENT: PERRLA, EOMI, no nystagmus, moist oropharynx   RESPIRATORY: CTAB, no w/c   CARDIOVASCULAR: RRR, no murmurs, gallops, rubs  ABDOMINAL: soft, non-tender, non-distended, positive bowel sounds   EXTREMITIES: no clubbing, cyanosis, or edema  NEUROLOGICAL: alert and oriented x 3, non-focal, no dysmetria, no disequilibrium  SKIN: no rashes or lesions   MUSCULOSKELETAL: no gross joint deformity                          14.1   5.9   )-----------( 164      ( 10 Aug 2018 04:59 )             43.5     08-10    134<L>  |  100  |  23  ----------------------------<  149<H>  3.8   |  24  |  0.92    Ca    8.9      10 Aug 2018 04:59        CAPILLARY BLOOD GLUCOSE      POCT Blood Glucose.: 141 mg/dL (10 Aug 2018 11:50)  POCT Blood Glucose.: 102 mg/dL (10 Aug 2018 07:40)  POCT Blood Glucose.: 154 mg/dL (09 Aug 2018 21:17)  POCT Blood Glucose.: 117 mg/dL (09 Aug 2018 17:36)      MEDICATIONS  (STANDING):  amLODIPine   Tablet 10 milliGRAM(s) Oral daily  aspirin 325 milliGRAM(s) Oral daily  atorvastatin 80 milliGRAM(s) Oral at bedtime  clopidogrel Tablet 75 milliGRAM(s) Oral daily  dextrose 5%. 1000 milliLiter(s) (50 mL/Hr) IV Continuous <Continuous>  dextrose 50% Injectable 12.5 Gram(s) IV Push once  dextrose 50% Injectable 25 Gram(s) IV Push once  heparin  Injectable 5000 Unit(s) SubCutaneous every 12 hours  hydrochlorothiazide 12.5 milliGRAM(s) Oral daily  insulin lispro (HumaLOG) corrective regimen sliding scale   SubCutaneous three times a day before meals  insulin lispro (HumaLOG) corrective regimen sliding scale   SubCutaneous at bedtime  oxyCODONE    5 mG/acetaminophen 325 mG 1 Tablet(s) Oral once    MEDICATIONS  (PRN):  acetaminophen   Tablet. 650 milliGRAM(s) Oral every 6 hours PRN Mild Pain (1 - 3)  dextrose 40% Gel 15 Gram(s) Oral once PRN Blood Glucose LESS THAN 70 milliGRAM(s)/deciliter  meclizine 25 milliGRAM(s) Oral every 8 hours PRN Dizziness  ondansetron Injectable 4 milliGRAM(s) IV Push every 6 hours PRN Nausea and/or Vomiting
Patient is a 78y old  Female who presents with a chief complaint of Lightheadedness (07 Aug 2018 01:16)  Subjective: Pt reports numerous 'dizzy' spells- cannot clarify whether they are presyncopal or vertiginous, states they are associated with whole body 'sensation,' palpitations, and anxiety- no diaphoresis, no HA or vision problems. No LOC or falls. Nonexertional, no positional associations- never occurs when she is at rest/sitting/laying. At present, states she feels fine because she is laying in bed. Would like to work with PT to try to 'trigger' an episode for evaluation. No active/acute concerns.    VITAL SIGNS:  Vital Signs Last 24 Hrs  T(C): 36.6 (07 Aug 2018 12:30), Max: 37.2 (06 Aug 2018 22:25)  T(F): 97.8 (07 Aug 2018 12:30), Max: 98.9 (06 Aug 2018 22:25)  HR: 65 (07 Aug 2018 12:30) (57 - 78)  BP: 145/72 (07 Aug 2018 12:30) (145/72 - 157/72)  BP(mean): --  RR: 18 (07 Aug 2018 12:24) (17 - 18)  SpO2: 98% (07 Aug 2018 12:30) (97% - 98%)    PHYSICAL EXAM:     GENERAL: no acute distress  HEENT: PERRLA, EOMI, moist oropharynx   RESPIRATORY: CTAB, no w/c   CARDIOVASCULAR: RRR, no murmurs, gallops, rubs  ABDOMINAL: soft, non-tender, non-distended, positive bowel sounds   EXTREMITIES: no clubbing, cyanosis, or edema  NEUROLOGICAL: alert and oriented x 3, non-focal  SKIN: no rashes or lesions   MUSCULOSKELETAL: no gross joint deformity                          14.3   6.4   )-----------( 166      ( 06 Aug 2018 21:32 )             42.5     08-06    140  |  103  |  14  ----------------------------<  131<H>  3.6   |  24  |  0.72    Ca    9.3      06 Aug 2018 21:32  Phos  3.0     08-06  Mg     2.2     08-06    TPro  7.2  /  Alb  4.5  /  TBili  0.4  /  DBili  x   /  AST  21  /  ALT  17  /  AlkPhos  110  08-06      CAPILLARY BLOOD GLUCOSE      POCT Blood Glucose.: 175 mg/dL (07 Aug 2018 11:19)  POCT Blood Glucose.: 125 mg/dL (07 Aug 2018 07:50)  POCT Blood Glucose.: 198 mg/dL (07 Aug 2018 03:26)      MEDICATIONS  (STANDING):  amLODIPine   Tablet 10 milliGRAM(s) Oral daily  aspirin 325 milliGRAM(s) Oral daily  atorvastatin 80 milliGRAM(s) Oral at bedtime  clopidogrel Tablet 75 milliGRAM(s) Oral daily  dextrose 5%. 1000 milliLiter(s) (50 mL/Hr) IV Continuous <Continuous>  dextrose 50% Injectable 12.5 Gram(s) IV Push once  dextrose 50% Injectable 25 Gram(s) IV Push once  hydrochlorothiazide 12.5 milliGRAM(s) Oral daily  insulin lispro (HumaLOG) corrective regimen sliding scale   SubCutaneous three times a day before meals  insulin lispro (HumaLOG) corrective regimen sliding scale   SubCutaneous at bedtime    MEDICATIONS  (PRN):  dextrose 40% Gel 15 Gram(s) Oral once PRN Blood Glucose LESS THAN 70 milliGRAM(s)/deciliter
Patient is a 78y old  Female who presents with a chief complaint of Lightheadedness (07 Aug 2018 01:16)  Subjective: no acute events overnight, no dizzy episodes while working with PT.        VITAL SIGNS:  Vital Signs Last 24 Hrs  T(C): 36.5 (08 Aug 2018 11:45), Max: 36.6 (08 Aug 2018 04:05)  T(F): 97.7 (08 Aug 2018 11:45), Max: 97.8 (08 Aug 2018 04:05)  HR: 58 (08 Aug 2018 11:45) (57 - 58)  BP: 137/62 (08 Aug 2018 11:45) (117/67 - 149/57)  BP(mean): --  RR: 18 (08 Aug 2018 11:45) (16 - 18)  SpO2: 97% (08 Aug 2018 11:45) (97% - 98%)      PHYSICAL EXAM:     GENERAL: no acute distress  HEENT: PERRLA, EOMI, moist oropharynx   RESPIRATORY: CTAB, no w/c   CARDIOVASCULAR: RRR, no murmurs, gallops, rubs  ABDOMINAL: soft, non-tender, non-distended, positive bowel sounds   EXTREMITIES: no clubbing, cyanosis, or edema  NEUROLOGICAL: alert and oriented x 3, non-focal  SKIN: no rashes or lesions   MUSCULOSKELETAL: no gross joint deformity  PSYCH: +anxious                          14.2   6.6   )-----------( 174      ( 08 Aug 2018 05:25 )             41.7     08-08    138  |  100  |  16  ----------------------------<  143<H>  4.0   |  24  |  0.75    Ca    9.3      08 Aug 2018 05:25  Phos  3.0     08-06  Mg     2.2     08-06    TPro  7.2  /  Alb  4.5  /  TBili  0.4  /  DBili  x   /  AST  21  /  ALT  17  /  AlkPhos  110  08-06      CAPILLARY BLOOD GLUCOSE      POCT Blood Glucose.: 115 mg/dL (08 Aug 2018 07:22)  POCT Blood Glucose.: 140 mg/dL (07 Aug 2018 21:20)  POCT Blood Glucose.: 96 mg/dL (07 Aug 2018 17:27)      MEDICATIONS  (STANDING):  amLODIPine   Tablet 10 milliGRAM(s) Oral daily  aspirin 325 milliGRAM(s) Oral daily  atorvastatin 80 milliGRAM(s) Oral at bedtime  clopidogrel Tablet 75 milliGRAM(s) Oral daily  dextrose 5%. 1000 milliLiter(s) (50 mL/Hr) IV Continuous <Continuous>  dextrose 50% Injectable 12.5 Gram(s) IV Push once  dextrose 50% Injectable 25 Gram(s) IV Push once  hydrochlorothiazide 12.5 milliGRAM(s) Oral daily  insulin lispro (HumaLOG) corrective regimen sliding scale   SubCutaneous three times a day before meals  insulin lispro (HumaLOG) corrective regimen sliding scale   SubCutaneous at bedtime    MEDICATIONS  (PRN):  dextrose 40% Gel 15 Gram(s) Oral once PRN Blood Glucose LESS THAN 70 milliGRAM(s)/deciliter

## 2018-08-10 NOTE — PROGRESS NOTE ADULT - PROBLEM SELECTOR PLAN 2
S/p multiple interventions.   -Cont. asa, plavix, statin, pt requests own meds

## 2018-08-10 NOTE — PROGRESS NOTE ADULT - ASSESSMENT
77 y/o F with h/o migraines, occipital neuralgia s/p many different injections, CAD s/p multiple stents (2007, 2013, 2017) on DAPT, HTN, HLD, DM2, multiple back surgeries w/ collapsed lung remote hx, urinary incontinence, dysphagia, subclavian stenosis, s/p 2 stents placed in b/l vertebral arteries 6/14/18 presents with >11 episodes of palpitations and SOB
77 year old woman well known to me, long history of coronary disease, multiple percutaneous coronary interventions and ventricular function remains well preserved. Has never manifested congestive heart failure, has no significant valvular heart disease and stable rhythm. Her functional capacity has always been good except as limited by pain, spinal syndromes and recent dysequilibrium.    Coronary disease - maintain aspirin and clopidogrel.    continue highest dose statin    Hypertension - continue irbesartan and amlodipine    Neurosurgical testing, cerebral angiogram unrevealing.    Episodes remain unexplained, severe vertigo following cerebral angiogram improving and while having symptoms blood pressure, heart rate and rhythm normal. Episodes leading to admission remain unexplained. Would like to observe as ambulates fully and as long distances as possible in hospital while on telemetry, but has not had slightest indication of arrhythmia on prolonged telemetry monitoring and implantable loop recorder seems most unlikely to have beneficial yield. As of now it is hard to make a case that symptoms are related to myocardial ischemia, orthostatic hypotension, arrhythmia and there is no cardiac reason to prolong hospital stay. She can be discharged to home and follow up with me as outpatient.
77 year old woman well known to me, long history of coronary disease, multiple percutaneous coronary interventions and ventricular function remains well preserved. Has never manifested congestive heart failure, has no significant valvular heart disease and stable rhythm. Her functional capacity has always been good except as limited by pain, spinal syndromes.    Coronary disease - maintain aspirin and clopidogrel.    continue highest dose statin    Hypertension - continue irbesartan and amlodipine    Neurosurgical testing, cerebral angiogram unrevealing.    Episodes remain unexplained, currently with severe vertigo since yesterday afternoon and this is while blood pressure, heart rate and rhythm normal. Episodes leading to admission remain unexplained. Would like to observe as ambulates fully and as long distances as possible in hospital while on telemetry, but unable currently due to vertigo. As of now it is hard to make a case that symptoms are related to myocardial ischemia, orthostatic hypotension, arrhythmia, but these remain considerations.
78F s/p stenting for symptomatic VBI here now with pre syncopal episodes  - DSA today with Dr. Scott
77 y/o F with h/o migraines, occipital neuralgia s/p many different injections, CAD s/p multiple stents (2007, 2013, 2017) on DAPT, HTN, HLD, DM2, multiple back surgeries w/ collapsed lung remote hx, urinary incontinence, dysphagia, subclavian stenosis, s/p 2 stents placed in b/l vertebral arteries 6/14/18 presents with >11 episodes of palpitations and SOB
77 y/o F with h/o migraines, occipital neuralgia s/p many different injections, CAD s/p multiple stents (2007, 2013, 2017) on DAPT, HTN, HLD, DM2, multiple back surgeries w/ collapsed lung remote hx, urinary incontinence, dysphagia, subclavian stenosis, s/p 2 stents placed in b/l vertebral arteries 6/14/18 presents with >11 episodes of lightheadedness, palpitations and SOB
79 y/o F with h/o migraines, occipital neuralgia s/p many different injections, CAD s/p multiple stents (2007, 2013, 2017) on DAPT, HTN, HLD, DM2, multiple back surgeries w/ collapsed lung remote hx, urinary incontinence, dysphagia, subclavian stenosis, s/p 2 stents placed in b/l vertebral arteries 6/14/18 presents with >11 episodes of lightheadedness, palpitations and SOB

## 2018-08-10 NOTE — PROGRESS NOTE ADULT - PROBLEM SELECTOR PLAN 5
-cont. crestor, pt request home med

## 2018-08-10 NOTE — PROGRESS NOTE ADULT - PROBLEM SELECTOR PLAN 1
Improved- suspect vestibular dysfunction vs. highly doubt vertebral artery dissection as patient's symptoms have improved and she has an otherwise nonfocal neuro exam.  - MRI/MRA not indicated  - meclizine 25mg po q6 hours prn for vertigo  - recommend vestibular rehab and outpatient ENT evaluation for inner ear pathology  - Dr. Gil to consider outpatient loop recorder if inpatient workup negative- pt would like to pursue.

## 2018-08-10 NOTE — PROGRESS NOTE ADULT - PROBLEM SELECTOR PLAN 4
-Trend vital signs  -Cont. amlodipine, HCTZ
-Trend vital signs  -Cont. amlodipine, HCTZ- may resume irbesartan/hctz as outpatient
-Trend vital signs  -Cont. amlodipine, HCTZ/irbesartan combo,
-Trend vital signs  -Cont. amlodipine, HCTZ/irbesartan combo,

## 2018-08-10 NOTE — PROGRESS NOTE ADULT - PROBLEM SELECTOR PROBLEM 2
Coronary artery disease without angina pectoris, unspecified vessel or lesion type, unspecified whether native or transplanted heart

## 2018-08-10 NOTE — PROGRESS NOTE ADULT - PROBLEM SELECTOR PROBLEM 3
Vertebral artery stenosis, unspecified laterality

## 2018-08-13 ENCOUNTER — INBOUND DOCUMENT (OUTPATIENT)
Age: 78
End: 2018-08-13

## 2018-08-14 PROBLEM — I65.09 OCCLUSION AND STENOSIS OF UNSPECIFIED VERTEBRAL ARTERY: Chronic | Status: ACTIVE | Noted: 2018-08-07

## 2018-08-15 ENCOUNTER — APPOINTMENT (OUTPATIENT)
Dept: CARDIOLOGY | Facility: CLINIC | Age: 78
End: 2018-08-15

## 2018-08-15 ENCOUNTER — APPOINTMENT (OUTPATIENT)
Dept: MRI IMAGING | Facility: HOSPITAL | Age: 78
End: 2018-08-15

## 2018-10-15 ENCOUNTER — NON-APPOINTMENT (OUTPATIENT)
Age: 78
End: 2018-10-15

## 2018-10-15 ENCOUNTER — APPOINTMENT (OUTPATIENT)
Dept: CARDIOLOGY | Facility: CLINIC | Age: 78
End: 2018-10-15
Payer: MEDICARE

## 2018-10-15 VITALS
HEART RATE: 61 BPM | DIASTOLIC BLOOD PRESSURE: 77 MMHG | BODY MASS INDEX: 25.27 KG/M2 | WEIGHT: 148 LBS | HEIGHT: 64 IN | SYSTOLIC BLOOD PRESSURE: 130 MMHG | OXYGEN SATURATION: 97 %

## 2018-10-15 PROCEDURE — 99215 OFFICE O/P EST HI 40 MIN: CPT

## 2018-10-15 PROCEDURE — 93000 ELECTROCARDIOGRAM COMPLETE: CPT

## 2018-10-15 RX ORDER — ESTRADIOL 10 UG/1
10 INSERT VAGINAL
Qty: 18 | Refills: 0 | Status: DISCONTINUED | COMMUNITY
Start: 2017-09-18 | End: 2018-10-15

## 2018-10-22 ENCOUNTER — APPOINTMENT (OUTPATIENT)
Dept: NEUROSURGERY | Facility: CLINIC | Age: 78
End: 2018-10-22
Payer: MEDICARE

## 2018-10-22 VITALS
HEIGHT: 64 IN | TEMPERATURE: 98.2 F | SYSTOLIC BLOOD PRESSURE: 159 MMHG | RESPIRATION RATE: 16 BRPM | BODY MASS INDEX: 25.61 KG/M2 | OXYGEN SATURATION: 96 % | WEIGHT: 150 LBS | DIASTOLIC BLOOD PRESSURE: 74 MMHG | HEART RATE: 60 BPM

## 2018-10-22 PROCEDURE — 99214 OFFICE O/P EST MOD 30 MIN: CPT

## 2019-02-13 ENCOUNTER — INPATIENT (INPATIENT)
Facility: HOSPITAL | Age: 79
LOS: 1 days | Discharge: HOME | DRG: 149 | End: 2019-02-15
Attending: INTERNAL MEDICINE | Admitting: INTERNAL MEDICINE
Payer: MEDICARE

## 2019-02-13 VITALS
TEMPERATURE: 98 F | HEIGHT: 64 IN | RESPIRATION RATE: 20 BRPM | DIASTOLIC BLOOD PRESSURE: 81 MMHG | WEIGHT: 147.93 LBS | HEART RATE: 76 BPM | OXYGEN SATURATION: 98 % | SYSTOLIC BLOOD PRESSURE: 171 MMHG

## 2019-02-13 DIAGNOSIS — I65.09 OCCLUSION AND STENOSIS OF UNSPECIFIED VERTEBRAL ARTERY: Chronic | ICD-10-CM

## 2019-02-13 DIAGNOSIS — R26.81 UNSTEADINESS ON FEET: ICD-10-CM

## 2019-02-13 LAB
ALBUMIN SERPL ELPH-MCNC: 4.2 G/DL — SIGNIFICANT CHANGE UP (ref 3.3–5)
ALP SERPL-CCNC: 112 U/L — SIGNIFICANT CHANGE UP (ref 40–120)
ALT FLD-CCNC: 17 U/L — SIGNIFICANT CHANGE UP (ref 10–45)
ANION GAP SERPL CALC-SCNC: 16 MMOL/L — SIGNIFICANT CHANGE UP (ref 5–17)
APTT BLD: 33.4 SEC — SIGNIFICANT CHANGE UP (ref 27.5–36.3)
AST SERPL-CCNC: 17 U/L — SIGNIFICANT CHANGE UP (ref 10–40)
BASOPHILS # BLD AUTO: 0 K/UL — SIGNIFICANT CHANGE UP (ref 0–0.2)
BASOPHILS NFR BLD AUTO: 0.2 % — SIGNIFICANT CHANGE UP (ref 0–2)
BILIRUB SERPL-MCNC: 0.4 MG/DL — SIGNIFICANT CHANGE UP (ref 0.2–1.2)
BUN SERPL-MCNC: 19 MG/DL — SIGNIFICANT CHANGE UP (ref 7–23)
CALCIUM SERPL-MCNC: 10.1 MG/DL — SIGNIFICANT CHANGE UP (ref 8.4–10.5)
CHLORIDE SERPL-SCNC: 101 MMOL/L — SIGNIFICANT CHANGE UP (ref 96–108)
CO2 SERPL-SCNC: 24 MMOL/L — SIGNIFICANT CHANGE UP (ref 22–31)
CREAT SERPL-MCNC: 0.68 MG/DL — SIGNIFICANT CHANGE UP (ref 0.5–1.3)
EOSINOPHIL # BLD AUTO: 0.1 K/UL — SIGNIFICANT CHANGE UP (ref 0–0.5)
EOSINOPHIL NFR BLD AUTO: 0.8 % — SIGNIFICANT CHANGE UP (ref 0–6)
GLUCOSE SERPL-MCNC: 151 MG/DL — HIGH (ref 70–99)
HCT VFR BLD CALC: 43.1 % — SIGNIFICANT CHANGE UP (ref 34.5–45)
HGB BLD-MCNC: 15 G/DL — SIGNIFICANT CHANGE UP (ref 11.5–15.5)
INR BLD: 0.95 RATIO — SIGNIFICANT CHANGE UP (ref 0.88–1.16)
LYMPHOCYTES # BLD AUTO: 2.1 K/UL — SIGNIFICANT CHANGE UP (ref 1–3.3)
LYMPHOCYTES # BLD AUTO: 25.6 % — SIGNIFICANT CHANGE UP (ref 13–44)
MCHC RBC-ENTMCNC: 29.9 PG — SIGNIFICANT CHANGE UP (ref 27–34)
MCHC RBC-ENTMCNC: 34.8 GM/DL — SIGNIFICANT CHANGE UP (ref 32–36)
MCV RBC AUTO: 86 FL — SIGNIFICANT CHANGE UP (ref 80–100)
MONOCYTES # BLD AUTO: 0.6 K/UL — SIGNIFICANT CHANGE UP (ref 0–0.9)
MONOCYTES NFR BLD AUTO: 7.7 % — SIGNIFICANT CHANGE UP (ref 2–14)
NEUTROPHILS # BLD AUTO: 5.3 K/UL — SIGNIFICANT CHANGE UP (ref 1.8–7.4)
NEUTROPHILS NFR BLD AUTO: 65.7 % — SIGNIFICANT CHANGE UP (ref 43–77)
PA ADP PRP-ACNC: 120 PRU — LOW (ref 194–417)
PLATELET # BLD AUTO: 172 K/UL — SIGNIFICANT CHANGE UP (ref 150–400)
POTASSIUM SERPL-MCNC: 3.8 MMOL/L — SIGNIFICANT CHANGE UP (ref 3.5–5.3)
POTASSIUM SERPL-SCNC: 3.8 MMOL/L — SIGNIFICANT CHANGE UP (ref 3.5–5.3)
PROT SERPL-MCNC: 7.9 G/DL — SIGNIFICANT CHANGE UP (ref 6–8.3)
PROTHROM AB SERPL-ACNC: 10.8 SEC — SIGNIFICANT CHANGE UP (ref 10–12.9)
RBC # BLD: 5.02 M/UL — SIGNIFICANT CHANGE UP (ref 3.8–5.2)
RBC # FLD: 12.7 % — SIGNIFICANT CHANGE UP (ref 10.3–14.5)
SODIUM SERPL-SCNC: 141 MMOL/L — SIGNIFICANT CHANGE UP (ref 135–145)
TROPONIN T, HIGH SENSITIVITY RESULT: 16 NG/L — SIGNIFICANT CHANGE UP (ref 0–51)
WBC # BLD: 8.1 K/UL — SIGNIFICANT CHANGE UP (ref 3.8–10.5)
WBC # FLD AUTO: 8.1 K/UL — SIGNIFICANT CHANGE UP (ref 3.8–10.5)

## 2019-02-13 PROCEDURE — 99285 EMERGENCY DEPT VISIT HI MDM: CPT

## 2019-02-13 PROCEDURE — 70498 CT ANGIOGRAPHY NECK: CPT | Mod: 26

## 2019-02-13 PROCEDURE — 70496 CT ANGIOGRAPHY HEAD: CPT | Mod: 26

## 2019-02-13 RX ORDER — ONDANSETRON 8 MG/1
4 TABLET, FILM COATED ORAL ONCE
Qty: 0 | Refills: 0 | Status: COMPLETED | OUTPATIENT
Start: 2019-02-13 | End: 2019-02-13

## 2019-02-13 RX ORDER — ACETAMINOPHEN 500 MG
1000 TABLET ORAL ONCE
Qty: 0 | Refills: 0 | Status: COMPLETED | OUTPATIENT
Start: 2019-02-13 | End: 2019-02-13

## 2019-02-13 RX ORDER — OMEPRAZOLE 10 MG/1
40 CAPSULE, DELAYED RELEASE ORAL DAILY
Qty: 0 | Refills: 0 | Status: DISCONTINUED | OUTPATIENT
Start: 2019-02-13 | End: 2019-02-15

## 2019-02-13 RX ORDER — HYDROCHLOROTHIAZIDE 25 MG
12.5 TABLET ORAL DAILY
Qty: 0 | Refills: 0 | Status: DISCONTINUED | OUTPATIENT
Start: 2019-02-13 | End: 2019-02-13

## 2019-02-13 RX ORDER — DEXTROSE 50 % IN WATER 50 %
25 SYRINGE (ML) INTRAVENOUS ONCE
Qty: 0 | Refills: 0 | Status: DISCONTINUED | OUTPATIENT
Start: 2019-02-13 | End: 2019-02-15

## 2019-02-13 RX ORDER — SODIUM CHLORIDE 9 MG/ML
1000 INJECTION, SOLUTION INTRAVENOUS
Qty: 0 | Refills: 0 | Status: DISCONTINUED | OUTPATIENT
Start: 2019-02-13 | End: 2019-02-15

## 2019-02-13 RX ORDER — DEXTROSE 50 % IN WATER 50 %
12.5 SYRINGE (ML) INTRAVENOUS ONCE
Qty: 0 | Refills: 0 | Status: DISCONTINUED | OUTPATIENT
Start: 2019-02-13 | End: 2019-02-15

## 2019-02-13 RX ORDER — INSULIN LISPRO 100/ML
VIAL (ML) SUBCUTANEOUS
Qty: 0 | Refills: 0 | Status: DISCONTINUED | OUTPATIENT
Start: 2019-02-13 | End: 2019-02-15

## 2019-02-13 RX ORDER — GLUCAGON INJECTION, SOLUTION 0.5 MG/.1ML
1 INJECTION, SOLUTION SUBCUTANEOUS ONCE
Qty: 0 | Refills: 0 | Status: DISCONTINUED | OUTPATIENT
Start: 2019-02-13 | End: 2019-02-15

## 2019-02-13 RX ORDER — DEXTROSE 50 % IN WATER 50 %
15 SYRINGE (ML) INTRAVENOUS ONCE
Qty: 0 | Refills: 0 | Status: DISCONTINUED | OUTPATIENT
Start: 2019-02-13 | End: 2019-02-15

## 2019-02-13 RX ORDER — IRBESARTAN AND HYDROCHLOROTHIAZIDE 12.5; 3 MG/1; MG/1
1 TABLET ORAL DAILY
Qty: 0 | Refills: 0 | Status: DISCONTINUED | OUTPATIENT
Start: 2019-02-13 | End: 2019-02-15

## 2019-02-13 RX ORDER — LOSARTAN POTASSIUM 100 MG/1
100 TABLET, FILM COATED ORAL DAILY
Qty: 0 | Refills: 0 | Status: DISCONTINUED | OUTPATIENT
Start: 2019-02-13 | End: 2019-02-13

## 2019-02-13 RX ORDER — AMLODIPINE BESYLATE 2.5 MG/1
10 TABLET ORAL DAILY
Qty: 0 | Refills: 0 | Status: DISCONTINUED | OUTPATIENT
Start: 2019-02-13 | End: 2019-02-15

## 2019-02-13 RX ORDER — HEPARIN SODIUM 5000 [USP'U]/ML
5000 INJECTION INTRAVENOUS; SUBCUTANEOUS EVERY 12 HOURS
Qty: 0 | Refills: 0 | Status: DISCONTINUED | OUTPATIENT
Start: 2019-02-13 | End: 2019-02-15

## 2019-02-13 RX ORDER — ROSUVASTATIN CALCIUM 5 MG/1
40 TABLET ORAL AT BEDTIME
Qty: 0 | Refills: 0 | Status: DISCONTINUED | OUTPATIENT
Start: 2019-02-13 | End: 2019-02-14

## 2019-02-13 RX ORDER — ASPIRIN/CALCIUM CARB/MAGNESIUM 324 MG
325 TABLET ORAL DAILY
Qty: 0 | Refills: 0 | Status: DISCONTINUED | OUTPATIENT
Start: 2019-02-13 | End: 2019-02-15

## 2019-02-13 RX ORDER — CLOPIDOGREL BISULFATE 75 MG/1
75 TABLET, FILM COATED ORAL DAILY
Qty: 0 | Refills: 0 | Status: DISCONTINUED | OUTPATIENT
Start: 2019-02-13 | End: 2019-02-15

## 2019-02-13 RX ORDER — FROVATRIPTAN SUCCINATE 2.5 MG/1
1 TABLET, FILM COATED ORAL
Qty: 0 | Refills: 0 | COMMUNITY

## 2019-02-13 RX ORDER — PANTOPRAZOLE SODIUM 20 MG/1
40 TABLET, DELAYED RELEASE ORAL
Qty: 0 | Refills: 0 | Status: DISCONTINUED | OUTPATIENT
Start: 2019-02-13 | End: 2019-02-13

## 2019-02-13 RX ADMIN — Medication 400 MILLIGRAM(S): at 10:56

## 2019-02-13 RX ADMIN — Medication 1000 MILLIGRAM(S): at 11:15

## 2019-02-13 RX ADMIN — ONDANSETRON 4 MILLIGRAM(S): 8 TABLET, FILM COATED ORAL at 10:56

## 2019-02-13 NOTE — H&P ADULT - HISTORY OF PRESENT ILLNESS
78y Female w /PMH migraines, occipital neuralgia, CAD, HTN, HLD, DM2, and vertebral artery stenosis s/p bilateral vertebral artery stents in June 2018 on ASA/Plavix p/w vertigo, nausea.   . Patient went to  bed at 1AM, woke up 3AM w/ abnormal sense of motion and some nausea, had trouble ambulating.   Symptoms have been persistent called daughter who came over   Patient w/ similar symptoms on waking the night before.

## 2019-02-13 NOTE — ED ADULT NURSE NOTE - OBJECTIVE STATEMENT
78F pt AxOx3 ambulatory to ED c/o head pain and to back of head, 78F pt AxOx3 ambulatory to ED c/o head pain to back of head, this am at 0100, which went away and then @ 0330 severe HA again w/ sudden onset of dizziness/unsteady gait. PMHx cardiac stentsx4, vertebral stent x2 2/ 40% occlusion - last placed 6/14/2018. Pt states she has nausea but denies vomiting. Pt denies CP/SOB. Pt denies vision changes/disturbances. On assessment, PERRLA, No facial droop/tongue deviation noted. Gross Neuro intact. No sensory deficits noted. NAVARRETE. Unsteady gait noted. No dysphagia. #20G LAC, #18G RAC, labs drawn and sent. CODE STROKE @ 1009. MDs at bedside for eval. . Pt transported to CT @ 1014.

## 2019-02-13 NOTE — ED ADULT NURSE NOTE - NSIMPLEMENTINTERV_GEN_ALL_ED
Implemented All Fall Risk Interventions:  [place] to call system. Call bell, personal items and telephone within reach. Instruct patient to call for assistance. Room bathroom lighting operational. Non-slip footwear when patient is off stretcher. Physically safe environment: no spills, clutter or unnecessary equipment. Stretcher in lowest position, wheels locked, appropriate side rails in place. Provide visual cue, wrist band, yellow gown, etc. Monitor gait and stability. Monitor for mental status changes and reorient to person, place, and time. Review medications for side effects contributing to fall risk. Reinforce activity limits and safety measures with patient and family.

## 2019-02-13 NOTE — H&P ADULT - NSHPLABSRESULTS_GEN_ALL_CORE
15.0   8.1   )-----------( 172      ( 13 Feb 2019 10:19 )             43.1       02-13    141  |  101  |  19  ----------------------------<  151<H>  3.8   |  24  |  0.68    Ca    10.1      13 Feb 2019 10:19    TPro  7.9  /  Alb  4.2  /  TBili  0.4  /  DBili  x   /  AST  17  /  ALT  17  /  AlkPhos  112  02-13                  PT/INR - ( 13 Feb 2019 10:19 )   PT: 10.8 sec;   INR: 0.95 ratio         PTT - ( 13 Feb 2019 10:19 )  PTT:33.4 sec    Lactate Trend            CAPILLARY BLOOD GLUCOSE      POCT Blood Glucose.: 141 mg/dL (13 Feb 2019 10:12)

## 2019-02-13 NOTE — CONSULT NOTE ADULT - SUBJECTIVE AND OBJECTIVE BOX
Neurology Consult    78y Female PMH migraines, occipital neuralgia, CAD, HTN, HLD, DM2, and vertebral artery stenosis s/p bilateral vertebral artery stents in June 2018 on ASA/Plavix p/w vertigo, nausea. LKW 0100 2/13/19. Patient went ot bed at 1AM, woke up 3AM w/ abnormal sense of motion and some nausea, had trouble ambulating. Symptoms have been persistent until evaluation. Patient w/ similar symptoms on waking the night before.     REVIEW OF SYSTEMS:  As above    MEDICATIONS    PMH: Vertebral artery stenosis, unspecified laterality  Family history of coronary artery disease  Hypercholesteremia  Hypertension  MI (myocardial infarction)  CAD (coronary artery disease)     PSH: Vertebral artery stenosis  S/P lumpectomy of breast  S/P cholecystectomy  H/O vaginal surgery  H/O fall  Rib deformity    FAMILY HISTORY:  No pertinent family history in first degree relatives    No history of dementia, strokes, or seizures     SOCIAL HISTORY:  No history of tobacco or alcohol use     Allergies    Reglan (Other)    Intolerances    Vital Signs Last 24 Hrs  T(C): 36.4 (13 Feb 2019 09:19), Max: 36.4 (13 Feb 2019 09:19)  T(F): 97.5 (13 Feb 2019 09:19), Max: 97.5 (13 Feb 2019 09:19)  HR: 76 (13 Feb 2019 09:19) (76 - 76)  BP: 171/81 (13 Feb 2019 09:19) (171/81 - 171/81)  BP(mean): --  RR: 20 (13 Feb 2019 09:19) (20 - 20)  SpO2: 98% (13 Feb 2019 09:19) (98% - 98%)    Neurological Examination:    Mental Status: Patient is alert, awake, oriented X3. Patient is fluent, no dysarthria, no aphasia. Follows commands well and able to answer questions appropriately. Mood and affect normal.    Cranial Nerves: PERRL, EOMI, visual field intact, V1-V3 intact, no gross facial asymmetry, tongue/uvula midline    Motor Exam: No drift  Right upper extremity: 5/5  Left upper extremity: 5/5  Right lower extremity: 5/5  Left lower extremity: 5/5    Normal bulk/tone    Sensory: Intact to light touch bilaterally. No extinction    Coordination: Finger to nose intact bilaterally     Reflexes: Bilateral 2+ Biceps, Brachial, (unable to test R patellar due to knee replacement, +2 R Patellar, +2 Ankle  Plantar: Equivocal bilateral    GENERAL: No acute distress  HEENT:  Normocephalic, atraumatic  EXTREMITIES: No edema, clubbing, cyanosis  MUSCULOSKELETAL: Normal range of motion  SKIN: No rashes    LABS:              Hemoglobin A1C:           RADIOLOGY:  CT head:  MRI:

## 2019-02-13 NOTE — H&P ADULT - ASSESSMENT
78y /o  Female PMH migraines, occipital neuralgia, CAD, HTN, HLD, DM2, and vertebral artery stenosis s/p bilateral vertebral artery stents in June 2018 on ASA/Plavix p/w vertigo, nausea   CT head, CTA h/n negative for occlusion. 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      seen by neuro  recs noted  mri s pending w/ nova  MRI brain without contrast   Continue antiplatelets   DVT prophylaxis,   Neurochecks  keep bp on higher side for now     neurosx eval noted

## 2019-02-13 NOTE — ED PROVIDER NOTE - PHYSICAL EXAMINATION
A&Ox3 no apparent distress. PERRLA, EOMI. Heart RRR no murmur. No JVD. No peripheral edema. Lungs CTA b/l no wheezes, rhonchi, rales. Abdomen soft nontender nondistended. Peripheral pulses present and equal b/l. Head NCAT. Skin normal for race. A&Ox3 no apparent distress. PERRLA, EOMI. No dysarthria, no aphasia. Follows commands well and able to answer questions appropriately. Mood and affect normal. Visual field intact, no gross facial asymmetry, Heart RRR no murmur. Pt unable to stand or walk independently. No JVD. No peripheral edema. Lungs CTA b/l no wheezes, rhonchi, rales. Abdomen soft nontender nondistended. Peripheral pulses present and equal b/l. Head NCAT. Skin normal for race. A&Ox3 in mild distress secondary to pain in head. PERRLA, EOMI. No dysarthria, no aphasia. Follows commands well and able to answer questions appropriately. Mood and affect normal. Visual field intact, no gross facial asymmetry, Heart RRR no murmur. Pt unable to stand or walk independently. No JVD. No peripheral edema. Lungs CTA b/l no wheezes, rhonchi, rales. Abdomen soft nontender nondistended. Peripheral pulses present and equal b/l. Head NCAT. Skin normal for race.

## 2019-02-13 NOTE — PATIENT PROFILE ADULT - IS PATIENT POST-MENOPAUSAL?
Andreas called to update that dental visit went well, teeth and oral cavity is healthy, no need for dental work.  Reviewed all future appts.  He is aware of jeg tube placement tomorrow and sim/port on 2/23/17, NPO guidelines and needing a .  Patient inquired about needing transportation for Radiation appts.  Appts/calender will be provided on 2/23/17 at simulation.  Will plan on meeting with patient at appt.  Will submit another [facility] referral for transportation needs and [facility] for any other needs for transportation if needed.  Andreas had no other concerns.    
yes

## 2019-02-13 NOTE — ED PROVIDER NOTE - PROGRESS NOTE DETAILS
Neurosurgery recommends MRI, MRA Nova for visualization of flow through stents. They will come to see patient in ER. - Michael Méndez PA-C

## 2019-02-13 NOTE — ED PROVIDER NOTE - OBJECTIVE STATEMENT
77yo F h/o migraines, occipital neuralgia, CAD, HTN, HLD, DM2, vertebral artery stenosis s/p bilateral vertebral artery stents in June 2018 on ASA/Plavix BIBA c/o vertigo, nausea, gait instability. Last known normal was at 1am this morning. Pt woke at 3am this morning with symptoms. Denies fever, chills, fall/trauma, abdominal pain, visual changes, sensory deficit. 77yo F h/o migraines, occipital neuralgia, CAD, HTN, HLD, DM2, vertebral artery stenosis s/p bilateral vertebral artery stents in June 2018 on ASA/Plavix BIBA c/o vertigo, nausea, gait instability and head pressure at back of head (stabbing at back of head) that woke her up from sleep.  Last known normal was at 1am this morning. Pt woke at 3am this morning with symptoms. Denies fever, chills, fall/trauma, abdominal pain, visual changes, sensory deficit. Patient states not able to work.

## 2019-02-13 NOTE — ED ADULT NURSE REASSESSMENT NOTE - NS ED NURSE REASSESS COMMENT FT1
Pt resting comfortably in bed. Denies pain/discomfort at this time. NAD noted. Safety maintained. Plan of care discussed w/ pt. Awaiting admission bed. Will continue to monitor.

## 2019-02-13 NOTE — ED PROVIDER NOTE - CLINICAL SUMMARY MEDICAL DECISION MAKING FREE TEXT BOX
Axel: 78 year old female with posterior headache, and gait instability. last seen normal 3 am. Patient with severe posterior headache and not able to ambulate. code stroke activated for posterior basilar symptoms. neuro at bedside. will get labs, nsx consult, reassess, admit . Axel: 78 year old female with posterior headache, and gait instability. last seen normal 3 am. Patient with severe posterior headache and not able t ambulate. code stroke activated for posterior basilar symptoms. neuro at bedside. will get labs, Patient has no focal deficits, cn 2-12 intact, gait ataxia (do not agree with neuro stroke note), nsx consult, reassess, admit .

## 2019-02-13 NOTE — ED ADULT NURSE REASSESSMENT NOTE - NS ED NURSE REASSESS COMMENT FT1
no change in mental status noted. Pt follows commands. Pt still remains slightly dizzy. Safety maintained. Bed locked in lowest position. Daughter at bedside.

## 2019-02-13 NOTE — CONSULT NOTE ADULT - ATTENDING COMMENTS
VASCULAR NEUROLOGY ATTENDING  The patient is seen and examined the history and imaging are reviewed. I agree with the resident note unless otherwise noted. Case discussed with neurosurgery. Await MRI/A NOVA and MRI C-spine. Outpatient neurology follow-up.

## 2019-02-13 NOTE — CONSULT NOTE ADULT - ASSESSMENT
78y Female PMH migraines, occipital neuralgia, CAD, HTN, HLD, DM2, and vertebral artery stenosis s/p bilateral vertebral artery stents in June 2018 on ASA/Plavix p/w vertigo, nausea. NIHSS 0, MRS 0. CT head, CTA h/n negative for occlusion. 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 Not a candidate for TPA due to outside window, not a candidate for endovascular intervention due to low NIHSS.     Recs:  MRI brain without contrast   Continue antiplatelets   DVT prophylaxis, Neurochecks  Permissive HTN up to 220/120 mmHg for first 24 hours after the symptom onset followed by gradual normotension.   HbA1C and LDL.

## 2019-02-13 NOTE — PROVIDER CONTACT NOTE (OTHER) - REASON
pt own medication verified by pharmacy, pt states she takes medication on in the morning. Crestor 40 mg scheduled for 2200 but pt states she takes this med daily and was taken already.

## 2019-02-13 NOTE — CONSULT NOTE ADULT - ASSESSMENT
78F s/p b/l VA stents 06/2018 on ASA/Plavix in ER for posterior headache and 2 episodes of double vision / gait ataxia that have somewhat improved since yesterday. CTH/CTA WNL, no evidence of Stent occlusion. Intact now but still subjective gait difficulty  - Stroke neuro eval  - cont ASA/Plavix  - f/u PRU  - MRI/MRA nova

## 2019-02-13 NOTE — CONSULT NOTE ADULT - SUBJECTIVE AND OBJECTIVE BOX
p ([id number])     HPI:  78F s/p b/l VA stents 06/2018 on ASA/Plavix in ER for posterior headache and 2 episodes of double vision / gait ataxia that have somewhat improved since yesterday. CTH/CTA WNL, no evidence of Stent occlusion. Intact now but still subjective gait difficulty    Exam:  AOx3, FC, PERRL, EOMI, no facial   5/5 throughout, no drift  SILT  no clonus, no dysmetria in BUE    --Anticoagulation:    =====================  PAST MEDICAL HISTORY   Vertebral artery stenosis, unspecified laterality  Family history of coronary artery disease  Hypercholesteremia  Hypertension  MI (myocardial infarction)  CAD (coronary artery disease)    PAST SURGICAL HISTORY   Vertebral artery stenosis  S/P lumpectomy of breast  S/P cholecystectomy  H/O vaginal surgery  H/O fall  Rib deformity    Reglan (Other)      MEDICATIONS:  Antibiotics:    Neuro:    Other:      SOCIAL HISTORY:   Occupation:   Marital Status:     FAMILY HISTORY:  No pertinent family history in first degree relatives      ROS: Negative except per HPI    LABS:  PT/INR - ( 13 Feb 2019 10:19 )   PT: 10.8 sec;   INR: 0.95 ratio         PTT - ( 13 Feb 2019 10:19 )  PTT:33.4 sec                        15.0   8.1   )-----------( 172      ( 13 Feb 2019 10:19 )             43.1     02-13    141  |  101  |  19  ----------------------------<  151<H>  3.8   |  24  |  0.68    Ca    10.1      13 Feb 2019 10:19    TPro  7.9  /  Alb  4.2  /  TBili  0.4  /  DBili  x   /  AST  17  /  ALT  17  /  AlkPhos  112  02-13

## 2019-02-14 ENCOUNTER — TRANSCRIPTION ENCOUNTER (OUTPATIENT)
Age: 79
End: 2019-02-14

## 2019-02-14 LAB
GLUCOSE BLDC GLUCOMTR-MCNC: 116 MG/DL — HIGH (ref 70–99)
GLUCOSE BLDC GLUCOMTR-MCNC: 125 MG/DL — HIGH (ref 70–99)
GLUCOSE BLDC GLUCOMTR-MCNC: 174 MG/DL — HIGH (ref 70–99)
GLUCOSE BLDC GLUCOMTR-MCNC: 192 MG/DL — HIGH (ref 70–99)
HBA1C BLD-MCNC: 7 % — HIGH (ref 4–5.6)

## 2019-02-14 PROCEDURE — 72125 CT NECK SPINE W/O DYE: CPT | Mod: 26

## 2019-02-14 PROCEDURE — 70551 MRI BRAIN STEM W/O DYE: CPT | Mod: 26

## 2019-02-14 PROCEDURE — 72141 MRI NECK SPINE W/O DYE: CPT | Mod: 26

## 2019-02-14 RX ORDER — SENNA PLUS 8.6 MG/1
2 TABLET ORAL AT BEDTIME
Qty: 0 | Refills: 0 | Status: DISCONTINUED | OUTPATIENT
Start: 2019-02-14 | End: 2019-02-15

## 2019-02-14 RX ORDER — TRAMADOL HYDROCHLORIDE 50 MG/1
25 TABLET ORAL ONCE
Qty: 0 | Refills: 0 | Status: DISCONTINUED | OUTPATIENT
Start: 2019-02-14 | End: 2019-02-14

## 2019-02-14 RX ORDER — ROSUVASTATIN CALCIUM 5 MG/1
40 TABLET ORAL
Qty: 0 | Refills: 0 | Status: DISCONTINUED | OUTPATIENT
Start: 2019-02-14 | End: 2019-02-15

## 2019-02-14 RX ORDER — TRAMADOL HYDROCHLORIDE 50 MG/1
50 TABLET ORAL ONCE
Qty: 0 | Refills: 0 | Status: DISCONTINUED | OUTPATIENT
Start: 2019-02-14 | End: 2019-02-14

## 2019-02-14 RX ADMIN — Medication 0.5 MILLIGRAM(S): at 14:11

## 2019-02-14 RX ADMIN — TRAMADOL HYDROCHLORIDE 50 MILLIGRAM(S): 50 TABLET ORAL at 12:05

## 2019-02-14 RX ADMIN — Medication 325 MILLIGRAM(S): at 11:30

## 2019-02-14 RX ADMIN — ROSUVASTATIN CALCIUM 40 MILLIGRAM(S): 5 TABLET ORAL at 05:50

## 2019-02-14 RX ADMIN — OMEPRAZOLE 40 MILLIGRAM(S): 10 CAPSULE, DELAYED RELEASE ORAL at 13:42

## 2019-02-14 RX ADMIN — HEPARIN SODIUM 5000 UNIT(S): 5000 INJECTION INTRAVENOUS; SUBCUTANEOUS at 17:35

## 2019-02-14 RX ADMIN — AMLODIPINE BESYLATE 10 MILLIGRAM(S): 2.5 TABLET ORAL at 05:51

## 2019-02-14 RX ADMIN — CLOPIDOGREL BISULFATE 75 MILLIGRAM(S): 75 TABLET, FILM COATED ORAL at 11:32

## 2019-02-14 RX ADMIN — TRAMADOL HYDROCHLORIDE 50 MILLIGRAM(S): 50 TABLET ORAL at 11:30

## 2019-02-14 RX ADMIN — IRBESARTAN AND HYDROCHLOROTHIAZIDE 1 TABLET(S): 12.5; 3 TABLET ORAL at 05:50

## 2019-02-14 NOTE — DISCHARGE NOTE ADULT - CARE PROVIDER_API CALL
Jose Scott)  Neurological Surgery  [address] [place] [address]  Phone: [phone]  Fax: [phone]  Follow Up Time: Jose Scott)  Neurological Surgery  [address] [place] [address]  Phone: [phone]  Fax: [phone]  Follow Up Time:     Deidre Baxter ()  Neurological Surgery  [address] [place] [address] [address]  [place] [address]  Phone: [phone]  Fax: [phone]  Follow Up Time:

## 2019-02-14 NOTE — DISCHARGE NOTE ADULT - CARE PLAN
Principal Discharge DX:	Gait instability Principal Discharge DX:	Gait instability  Goal:	improve ambulation  Assessment and plan of treatment:	CTH/CTA -no evidence of Stent occlusion.  - continue ASA/Plavix  - MRI/MRA NOVA / C-spine reviewed by neuro surgery  - Shows good flow in posterior circulation and no evidence of stent thrombosis or stroke  - C spine shows moderate degenerative changes most obvious at C5-6 but patient remains neurologically intact and relatively asymptomatic.  if you develop change in mental status, vision change, extremity weakness-any signs of stroke please get medical assistance immediately  - neuro surgery outpatient f/u with Dr. Scott and Dr. Baxter after discharge  Secondary Diagnosis:	Vertebral artery stenosis, unspecified laterality  Assessment and plan of treatment:	h/o bilateral vertebral artery stents in June 2018 .  TH/CTA -no evidence of Stent occlusion.  - continue ASA/Plavix  - MRI/MRA NOVA / C-spine reviewed by neuro surgery  - Shows good flow in posterior circulation and no evidence of stent thrombosis or stroke  - C spine shows moderate degenerative changes most obvious at C5-6 but patient remains neurologically intact and relatively asymptomatic.  - per neuro surgery outpatient f/u with Dr. Scott and Dr. Baxter after discharge  Secondary Diagnosis:	Hypertension  Assessment and plan of treatment:	continue home medication  Secondary Diagnosis:	CAD (coronary artery disease)  Assessment and plan of treatment:	continue home medication  Secondary Diagnosis:	Type 2 diabetes mellitus  Assessment and plan of treatment:	HgA1C this admission.7.0  Make sure you get your HgA1c checked every three months.  If you take oral diabetes medications, check your blood glucose two times a day.  If you take insulin, check your blood glucose before meals and at bedtime.  It's important not to skip any meals.  Keep a log of your blood glucose results and always take it with you to your doctor appointments.  Keep a list of your current medications including injectables and over the counter medications and bring this medication list with you to all your doctor appointments.  If you have not seen your opthalmologist this year call for appointment.  Check your feet daily for redness, sores, or openings. Do not self treat. If no improvement in two days call your primary care physician for an appointment.  Low blood sugar (hypoglycemia) is a blood sugar below 70mg/dl. Check your blood sugar if you feel signs/symptoms of hypoglycemia. If your blood sugar is below 70 take 15 grams of carbohydrates (ex 4 oz of apple juice, 3-4 glucosr tablets, or 4-6 oz of regular soda) wait 15 minutes and repeat blood sugar to make sure it comes up above 70.  If your blood sugar is above 70 and you are due for a meal, have a meal.  If you are not due for a meal have a snack.  This snack helps keeps your blood sugar at a safe range.

## 2019-02-14 NOTE — DISCHARGE NOTE ADULT - PLAN OF CARE
improve ambulation CTH/CTA -no evidence of Stent occlusion.  - continue ASA/Plavix  - MRI/MRA NOVA / C-spine reviewed by neuro surgery  - Shows good flow in posterior circulation and no evidence of stent thrombosis or stroke  - C spine shows moderate degenerative changes most obvious at C5-6 but patient remains neurologically intact and relatively asymptomatic.  if you develop change in mental status, vision change, extremity weakness-any signs of stroke please get medical assistance immediately  - neuro surgery outpatient f/u with Dr. Scott and Dr. Baxter after discharge h/o bilateral vertebral artery stents in June 2018 .  TH/CTA -no evidence of Stent occlusion.  - continue ASA/Plavix  - MRI/MRA NOVA / C-spine reviewed by neuro surgery  - Shows good flow in posterior circulation and no evidence of stent thrombosis or stroke  - C spine shows moderate degenerative changes most obvious at C5-6 but patient remains neurologically intact and relatively asymptomatic.  - per neuro surgery outpatient f/u with Dr. Scott and Dr. Baxter after discharge continue home medication HgA1C this admission.7.0  Make sure you get your HgA1c checked every three months.  If you take oral diabetes medications, check your blood glucose two times a day.  If you take insulin, check your blood glucose before meals and at bedtime.  It's important not to skip any meals.  Keep a log of your blood glucose results and always take it with you to your doctor appointments.  Keep a list of your current medications including injectables and over the counter medications and bring this medication list with you to all your doctor appointments.  If you have not seen your opthalmologist this year call for appointment.  Check your feet daily for redness, sores, or openings. Do not self treat. If no improvement in two days call your primary care physician for an appointment.  Low blood sugar (hypoglycemia) is a blood sugar below 70mg/dl. Check your blood sugar if you feel signs/symptoms of hypoglycemia. If your blood sugar is below 70 take 15 grams of carbohydrates (ex 4 oz of apple juice, 3-4 glucosr tablets, or 4-6 oz of regular soda) wait 15 minutes and repeat blood sugar to make sure it comes up above 70.  If your blood sugar is above 70 and you are due for a meal, have a meal.  If you are not due for a meal have a snack.  This snack helps keeps your blood sugar at a safe range.

## 2019-02-14 NOTE — PROGRESS NOTE ADULT - SUBJECTIVE AND OBJECTIVE BOX
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 No nausea, vomiting, or hematemesis; No diarrhea or constipation. No melena or hematochezia.  GENITOURINARY: No dysuria, frequency, hematuria, or incontinence  NEUROLOGICAL: c/o headaches   SKIN: No itching, burning, rashes, or lesions   LYMPH Nodes: No enlarged glands  ENDOCRINE: No heat or cold intolerance; No hair loss  MUSCULOSKELETAL: No joint pain or swelling; No muscle, back, or extremity pain    Medications:  MEDICATIONS  (STANDING):  amLODIPine   Tablet 10 milliGRAM(s) Oral daily  aspirin enteric coated 325 milliGRAM(s) Oral daily  clopidogrel Tablet 75 milliGRAM(s) Oral daily  dextrose 5%. 1000 milliLiter(s) (50 mL/Hr) IV Continuous <Continuous>  dextrose 50% Injectable 12.5 Gram(s) IV Push once  dextrose 50% Injectable 25 Gram(s) IV Push once  dextrose 50% Injectable 25 Gram(s) IV Push once  heparin  Injectable 5000 Unit(s) SubCutaneous every 12 hours  insulin lispro (HumaLOG) corrective regimen sliding scale   SubCutaneous three times a day before meals  irbesartan 300 mG/hydrochlorothiazide 12.5 mG 1 Tablet(s) Oral daily  LORazepam   Injectable 0.5 milliGRAM(s) IV Push once  omeprazole 40 milliGRAM(s) Oral daily  rosuvastatin 40 milliGRAM(s) Oral <User Schedule>    MEDICATIONS  (PRN):  dextrose 40% Gel 15 Gram(s) Oral once PRN Blood Glucose LESS THAN 70 milliGRAM(s)/deciliter  glucagon  Injectable 1 milliGRAM(s) IntraMuscular once PRN Glucose LESS THAN 70 milligrams/deciliter  senna 2 Tablet(s) Oral at bedtime PRN Constipation    	    PHYSICAL EXAM:  T(C): 36.4 (02-14-19 @ 04:57), Max: 36.8 (02-14-19 @ 00:49)  HR: 59 (02-14-19 @ 04:57) (51 - 63)  BP: 159/74 (02-14-19 @ 04:57) (109/67 - 159/74)  RR: 18 (02-14-19 @ 04:57) (16 - 18)  SpO2: 95% (02-14-19 @ 04:57) (94% - 97%)  Wt(kg): --  I&O's Summary    13 Feb 2019 07:01  -  14 Feb 2019 07:00  --------------------------------------------------------  IN: 480 mL / OUT: 0 mL / NET: 480 mL        Appearance: Normal	  HEENT:   Normal oral mucosa, PERRL, EOMI	  Lymphatic: No lymphadenopathy  Cardiovascular: Normal S1 S2, No JVD, No murmurs, No edema  Respiratory: Lungs clear to auscultation	  Psychiatry: A & O x 3, Mood & affect appropriate  Gastrointestinal:  Soft, Non-tender, + BS	  Skin: No rashes, No ecchymoses, No cyanosis	  Neurologic: Non-focal from motor equal b/l sensory intact  dtr equal   Extremities: Normal range of motion, No clubbing, cyanosis or edema  Vascular: Peripheral pulses palpable 2+ bilaterally    TELEMETRY: 	    ECG:  	  RADIOLOGY:  OTHER: 	  	  LABS:	 	    CARDIAC MARKERS:                                15.0   8.1   )-----------( 172      ( 13 Feb 2019 10:19 )             43.1     02-13    141  |  101  |  19  ----------------------------<  151<H>  3.8   |  24  |  0.68    Ca    10.1      13 Feb 2019 10:19    TPro  7.9  /  Alb  4.2  /  TBili  0.4  /  DBili  x   /  AST  17  /  ALT  17  /  AlkPhos  112  02-13    proBNP:   Lipid Profile:   HgA1c: Hemoglobin A1C, Whole Blood: 7.0 % (02-14 @ 07:51)    TSH:

## 2019-02-14 NOTE — PROGRESS NOTE ADULT - SUBJECTIVE AND OBJECTIVE BOX
Patient seen and examined at bedside.    --Anticoagulation--  aspirin enteric coated 325 milliGRAM(s) Oral daily  clopidogrel Tablet 75 milliGRAM(s) Oral daily  heparin  Injectable 5000 Unit(s) SubCutaneous every 12 hours    T(C): 36.4 (02-14-19 @ 04:57), Max: 36.8 (02-14-19 @ 00:49)  HR: 59 (02-14-19 @ 04:57) (51 - 78)  BP: 159/74 (02-14-19 @ 04:57) (109/67 - 171/81)  RR: 18 (02-14-19 @ 04:57) (16 - 20)  SpO2: 95% (02-14-19 @ 04:57) (94% - 98%)  Wt(kg): --    Exam:  AOx3, FC, PERRL, EOMI, no facial   5/5 throughout, no drift  SILT  no clonus, no dysmetria in BUE

## 2019-02-14 NOTE — PROGRESS NOTE ADULT - ASSESSMENT
78F s/p b/l VA stents 06/2018 on ASA/Plavix in ER for posterior headache and 2 episodes of double vision / gait ataxia that have somewhat improved since yesterday. CTH/CTA WNL, no evidence of Stent occlusion. Intact now but still subjective gait difficulty  - cont ASA/Plavix  - P2y12; 120  - f/u MRI/MRA nova for stent flow  - f/u MRI c spine wo for hx of cervical disc disease and gait ataxia

## 2019-02-14 NOTE — DISCHARGE NOTE ADULT - HOSPITAL COURSE
8y /o  Female PMH migraines, occipital neuralgia, CAD, HTN, HLD, DM2, and vertebral artery stenosis s/p bilateral vertebral artery stents in June 2018 on ASA/Plavix p/w vertigo, nausea   CT head, CTA h/n negative for occlusion. Vertigo, nausea in the setting of multiple stent and poor vasculature possibly 2/2 to vertebrobasilar insufficiency 8y /o  Female PMH migraines, occipital neuralgia, CAD, HTN, HLD, DM2, and vertebral artery stenosis s/p bilateral vertebral artery stents in June 2018 on ASA/Plavix p/w vertigo, nausea   CT head, CTA h/n negative for occlusion. Vertigo, nausea in the setting of multiple stent and poor vasculature possibly 2/2 to vertebrobasilar insufficiency   double vision / gait ataxia that have somewhat improved since yesterday. CTH/CTA WNL, no evidence of Stent occlusion. Intact now but still subjective gait difficulty  - cont ASA/Plavix  - MRI/MRA NOVA / C-spine reviewed by neuro surgery.  - Shows good flow in posterior circulation and no evidence of stent thrombosis or stroke  - C spine shows moderate degenerative changes most obvious at C5-6 but patient remains neurologically intact and relatively asymptomatic.  - Recommend outpatient f/u with Dr. Scott and Dr. Baxter after discharge;  cleared by MD for discharge home.  patient is declining home care.

## 2019-02-14 NOTE — PROGRESS NOTE ADULT - ASSESSMENT
78y /o  Female PMH migraines, occipital neuralgia, CAD, HTN, HLD, DM2, and vertebral artery stenosis s/p bilateral vertebral artery stents in June 2018 on ASA/Plavix p/w vertigo, nausea   CT head, CTA h/n negative for occlusion. 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      neurosx f/u noted  recs noted  mri s pending w/ nova  MRI brain without contrast   Continue antiplatelets   DVT prophylaxis,   Neurochecks  keep bp on higher side for now   cont current meds  analgesics prn

## 2019-02-14 NOTE — DISCHARGE NOTE ADULT - PATIENT PORTAL LINK FT
You can access the [facility] Patient Portal, offered by [facility], by registering with the following website: [email or web address]

## 2019-02-14 NOTE — DISCHARGE NOTE ADULT - PROVIDER TOKENS
PROVIDER:[TOKEN:[[id number]]] PROVIDER:[TOKEN:[[id number]]],PROVIDER:[TOKEN:[[id number]]]

## 2019-02-14 NOTE — DISCHARGE NOTE ADULT - MEDICATION SUMMARY - MEDICATIONS TO TAKE
I will START or STAY ON the medications listed below when I get home from the hospital:    aspirin 325 mg oral delayed release tablet  -- 1 tab(s) by mouth once a day  -- Indication: For antiplatelet    metFORMIN 500 mg oral tablet  -- 1 tab(s) by mouth once a day  -- Indication: For type 2 diabetes    Crestor 40 mg oral tablet  -- 1 tab(s) by mouth once a day (at bedtime)  -- Indication: For lipids    irbesartan-hydrochlorothiazide 300mg-12.5mg oral tablet  -- 1 tab(s) by mouth once a day  -- Indication: For cardiac    clopidogrel 75 mg oral tablet  -- 1 tab(s) by mouth once a day  -- Indication: For antiplatelet    amLODIPine 10 mg oral tablet  -- 1 tab(s) by mouth once a day  -- Indication: For cardiac    senna oral tablet  -- 2 tab(s) by mouth once a day (at bedtime), As needed, Constipation  -- Indication: For laxative    omeprazole 40 mg oral delayed release capsule  -- 1 cap(s) by mouth once a day  -- Indication: For for stomach    multivitamin  -- 1 tab(s) by mouth once a day  -- Indication: For vitamin    cyanocobalamin 500 mcg oral tablet  -- 1 tab(s) by mouth once a day  -- Indication: For vitamin

## 2019-02-14 NOTE — DISCHARGE NOTE ADULT - SECONDARY DIAGNOSIS.
Vertebral artery stenosis, unspecified laterality Hypertension CAD (coronary artery disease) Type 2 diabetes mellitus

## 2019-02-14 NOTE — DISCHARGE NOTE ADULT - ADDITIONAL INSTRUCTIONS
follow up with your doctor in 1 week-As per our discussion you will call for the appointment/declined home care

## 2019-02-14 NOTE — DISCHARGE NOTE ADULT - CARE PROVIDERS DIRECT ADDRESSES
[email or web address]

## 2019-02-15 VITALS — SYSTOLIC BLOOD PRESSURE: 136 MMHG | HEART RATE: 71 BPM | DIASTOLIC BLOOD PRESSURE: 67 MMHG

## 2019-02-15 LAB
ANION GAP SERPL CALC-SCNC: 15 MMOL/L — SIGNIFICANT CHANGE UP (ref 5–17)
BUN SERPL-MCNC: 21 MG/DL — SIGNIFICANT CHANGE UP (ref 7–23)
CALCIUM SERPL-MCNC: 9.2 MG/DL — SIGNIFICANT CHANGE UP (ref 8.4–10.5)
CHLORIDE SERPL-SCNC: 102 MMOL/L — SIGNIFICANT CHANGE UP (ref 96–108)
CO2 SERPL-SCNC: 22 MMOL/L — SIGNIFICANT CHANGE UP (ref 22–31)
CREAT SERPL-MCNC: 0.77 MG/DL — SIGNIFICANT CHANGE UP (ref 0.5–1.3)
GLUCOSE SERPL-MCNC: 121 MG/DL — HIGH (ref 70–99)
HCT VFR BLD CALC: 42.4 % — SIGNIFICANT CHANGE UP (ref 34.5–45)
HGB BLD-MCNC: 13.1 G/DL — SIGNIFICANT CHANGE UP (ref 11.5–15.5)
MCHC RBC-ENTMCNC: 28.4 PG — SIGNIFICANT CHANGE UP (ref 27–34)
MCHC RBC-ENTMCNC: 30.9 GM/DL — LOW (ref 32–36)
MCV RBC AUTO: 92 FL — SIGNIFICANT CHANGE UP (ref 80–100)
PLATELET # BLD AUTO: 170 K/UL — SIGNIFICANT CHANGE UP (ref 150–400)
POTASSIUM SERPL-MCNC: 4 MMOL/L — SIGNIFICANT CHANGE UP (ref 3.5–5.3)
POTASSIUM SERPL-SCNC: 4 MMOL/L — SIGNIFICANT CHANGE UP (ref 3.5–5.3)
RBC # BLD: 4.61 M/UL — SIGNIFICANT CHANGE UP (ref 3.8–5.2)
RBC # FLD: 14.5 % — SIGNIFICANT CHANGE UP (ref 10.3–14.5)
SODIUM SERPL-SCNC: 139 MMOL/L — SIGNIFICANT CHANGE UP (ref 135–145)
WBC # BLD: 5.69 K/UL — SIGNIFICANT CHANGE UP (ref 3.8–10.5)
WBC # FLD AUTO: 5.69 K/UL — SIGNIFICANT CHANGE UP (ref 3.8–10.5)

## 2019-02-15 PROCEDURE — 83036 HEMOGLOBIN GLYCOSYLATED A1C: CPT

## 2019-02-15 PROCEDURE — 72141 MRI NECK SPINE W/O DYE: CPT

## 2019-02-15 PROCEDURE — 99285 EMERGENCY DEPT VISIT HI MDM: CPT | Mod: 25

## 2019-02-15 PROCEDURE — 96375 TX/PRO/DX INJ NEW DRUG ADDON: CPT

## 2019-02-15 PROCEDURE — 70450 CT HEAD/BRAIN W/O DYE: CPT

## 2019-02-15 PROCEDURE — 80053 COMPREHEN METABOLIC PANEL: CPT

## 2019-02-15 PROCEDURE — 70496 CT ANGIOGRAPHY HEAD: CPT

## 2019-02-15 PROCEDURE — 70544 MR ANGIOGRAPHY HEAD W/O DYE: CPT

## 2019-02-15 PROCEDURE — 85610 PROTHROMBIN TIME: CPT

## 2019-02-15 PROCEDURE — 72125 CT NECK SPINE W/O DYE: CPT

## 2019-02-15 PROCEDURE — 85730 THROMBOPLASTIN TIME PARTIAL: CPT

## 2019-02-15 PROCEDURE — 70551 MRI BRAIN STEM W/O DYE: CPT

## 2019-02-15 PROCEDURE — 80048 BASIC METABOLIC PNL TOTAL CA: CPT

## 2019-02-15 PROCEDURE — 84484 ASSAY OF TROPONIN QUANT: CPT

## 2019-02-15 PROCEDURE — 85027 COMPLETE CBC AUTOMATED: CPT

## 2019-02-15 PROCEDURE — 85576 BLOOD PLATELET AGGREGATION: CPT

## 2019-02-15 PROCEDURE — 96365 THER/PROPH/DIAG IV INF INIT: CPT | Mod: XU

## 2019-02-15 PROCEDURE — 80061 LIPID PANEL: CPT

## 2019-02-15 PROCEDURE — 97161 PT EVAL LOW COMPLEX 20 MIN: CPT

## 2019-02-15 PROCEDURE — 82962 GLUCOSE BLOOD TEST: CPT

## 2019-02-15 PROCEDURE — 93005 ELECTROCARDIOGRAM TRACING: CPT

## 2019-02-15 PROCEDURE — 70498 CT ANGIOGRAPHY NECK: CPT

## 2019-02-15 RX ORDER — SENNA PLUS 8.6 MG/1
2 TABLET ORAL
Qty: 0 | Refills: 0 | DISCHARGE
Start: 2019-02-15

## 2019-02-15 RX ORDER — PREGABALIN 225 MG/1
0 CAPSULE ORAL
Qty: 0 | Refills: 0 | COMMUNITY

## 2019-02-15 RX ADMIN — ROSUVASTATIN CALCIUM 40 MILLIGRAM(S): 5 TABLET ORAL at 05:16

## 2019-02-15 RX ADMIN — HEPARIN SODIUM 5000 UNIT(S): 5000 INJECTION INTRAVENOUS; SUBCUTANEOUS at 05:14

## 2019-02-15 RX ADMIN — CLOPIDOGREL BISULFATE 75 MILLIGRAM(S): 75 TABLET, FILM COATED ORAL at 12:30

## 2019-02-15 RX ADMIN — OMEPRAZOLE 40 MILLIGRAM(S): 10 CAPSULE, DELAYED RELEASE ORAL at 12:30

## 2019-02-15 RX ADMIN — Medication 325 MILLIGRAM(S): at 12:29

## 2019-02-15 RX ADMIN — IRBESARTAN AND HYDROCHLOROTHIAZIDE 1 TABLET(S): 12.5; 3 TABLET ORAL at 05:17

## 2019-02-15 RX ADMIN — AMLODIPINE BESYLATE 10 MILLIGRAM(S): 2.5 TABLET ORAL at 05:17

## 2019-02-15 NOTE — PROGRESS NOTE ADULT - SUBJECTIVE AND OBJECTIVE BOX
Patient seen and examined at bedside.    --Anticoagulation--  aspirin enteric coated 325 milliGRAM(s) Oral daily  clopidogrel Tablet 75 milliGRAM(s) Oral daily  heparin  Injectable 5000 Unit(s) SubCutaneous every 12 hours    T(C): 36.6 (02-15-19 @ 05:09), Max: 36.9 (02-14-19 @ 13:50)  HR: 56 (02-15-19 @ 05:09) (56 - 60)  BP: 125/76 (02-15-19 @ 05:09) (122/60 - 144/69)  RR: 18 (02-15-19 @ 05:09) (18 - 18)  SpO2: 95% (02-15-19 @ 05:09) (94% - 95%)  Wt(kg): --    Exam:  AOx3, FC, PERRL, EOMI, no facial   5/5 throughout, no drift  SILT  no clonus, no dysmetria in BUE

## 2019-02-15 NOTE — PHYSICAL THERAPY INITIAL EVALUATION ADULT - PERTINENT HX OF CURRENT PROBLEM, REHAB EVAL
Pt is a 78y Female w /PMH migraines, occipital neuralgia, CAD, HTN, HLD, DM2, and vertebral artery stenosis s/p bilateral vertebral artery stents in June 2018 on ASA/Plavix p/w vertigo, nausea in the setting of multiple stent and poor vasculature possibly 2/2 to vertebrobasilar insufficiency ?. Patient went to  bed at 1AM, woke up 3AM w/ abnormal sense of motion and some nausea, had trouble ambulating.

## 2019-02-15 NOTE — PROGRESS NOTE ADULT - ASSESSMENT
78y /o  Female PMH migraines, occipital neuralgia, CAD, HTN, HLD, DM2, and vertebral artery stenosis s/p bilateral vertebral artery stents in June 2018 on ASA/Plavix p/w vertigo, nausea   CT head, CTA h/n negative for occlusion. 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      neurosx f/u noted  recs noted  mri s noted   Continue  meds   DVT prophylaxis,   htn   cont current meds  analgesics prn   cleared by neurosx  d/c planning

## 2019-02-15 NOTE — PHYSICAL THERAPY INITIAL EVALUATION ADULT - PRECAUTIONS/LIMITATIONS, REHAB EVAL
Cassie Atrhur is a 3 day old male who was brought in for this visit.   History was provided by the caregiver  HPI:   Patient presents with:  [place]: feeding 1.5oz of pumped breast milk, supplementing 1.5oz of NB enfamil      Birth History:    Birth   Daniele Bullard adenopathy  Breast: normal on inspection without masses  Respiratory: normal to inspection lungs are clear to auscultation bilaterally normal respiratory effort  Cardiovascular: regular rate and rhythm no murmurs, femoral pulses normal  Abdomen: soft non-t vision precautions/continued from above:  CT head, CTA h/n negative for occlusion. MRI Head 2/14: No acute infarcts. Small vessel white matter ischemic changes

## 2019-02-15 NOTE — CHART NOTE - NSCHARTNOTEFT_GEN_A_CORE
Asked by attending MD to discharge pt home-cleared by neuro surgery; d/w pt discharge plan. pt declined home care/physical therapy per ;  as per discussion pt will make her appointment with her MD.  Amanda Hi(NP)  [address], [phone]

## 2019-02-15 NOTE — PROGRESS NOTE ADULT - SUBJECTIVE AND OBJECTIVE BOX
CHIEF COMPLAINT:Patient is a 78y old  Female who presents with a chief complaint of unsteady gait (14 Feb 2019 13:03)    	        PAST MEDICAL & SURGICAL HISTORY:  Vertebral artery stenosis, unspecified laterality  Family history of coronary artery disease  Hypercholesteremia  Hypertension  MI (myocardial infarction): 2007  CAD (coronary artery disease): s/p stents x2-2007  Vertebral artery stenosis: s/p b/l stents 6/14/18  S/P lumpectomy of breast: x5-benign tumors  S/P cholecystectomy  H/O vaginal surgery: robotic-Nov 2012  H/O fall: 2.5 years ago multiple upper and lower injuries  Rib deformity: born with extra ribs, 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 No nausea, vomiting, or hematemesis; No diarrhea or constipation. No melena or hematochezia.  GENITOURINARY: No dysuria, frequency, hematuria, or incontinence  NEUROLOGICAL:headaches better   SKIN: No itching, burning, rashes, or lesions   LYMPH Nodes: No enlarged glands  ENDOCRINE: No heat or cold intolerance; No hair loss  MUSCULOSKELETAL: No joint pain or swelling; No muscle, back, or extremity pain    Medications:  MEDICATIONS  (STANDING):  amLODIPine   Tablet 10 milliGRAM(s) Oral daily  aspirin enteric coated 325 milliGRAM(s) Oral daily  clopidogrel Tablet 75 milliGRAM(s) Oral daily  dextrose 5%. 1000 milliLiter(s) (50 mL/Hr) IV Continuous <Continuous>  dextrose 50% Injectable 12.5 Gram(s) IV Push once  dextrose 50% Injectable 25 Gram(s) IV Push once  dextrose 50% Injectable 25 Gram(s) IV Push once  heparin  Injectable 5000 Unit(s) SubCutaneous every 12 hours  insulin lispro (HumaLOG) corrective regimen sliding scale   SubCutaneous three times a day before meals  irbesartan 300 mG/hydrochlorothiazide 12.5 mG 1 Tablet(s) Oral daily  omeprazole 40 milliGRAM(s) Oral daily  rosuvastatin 40 milliGRAM(s) Oral <User Schedule>    MEDICATIONS  (PRN):  dextrose 40% Gel 15 Gram(s) Oral once PRN Blood Glucose LESS THAN 70 milliGRAM(s)/deciliter  glucagon  Injectable 1 milliGRAM(s) IntraMuscular once PRN Glucose LESS THAN 70 milligrams/deciliter  senna 2 Tablet(s) Oral at bedtime PRN Constipation    	    PHYSICAL EXAM:  T(C): 36.6 (02-15-19 @ 10:00), Max: 36.9 (02-14-19 @ 13:50)  HR: 58 (02-15-19 @ 10:00) (56 - 60)  BP: 107/64 (02-15-19 @ 10:00) (107/64 - 144/69)  RR: 18 (02-15-19 @ 10:00) (18 - 18)  SpO2: 96% (02-15-19 @ 10:00) (94% - 96%)  Wt(kg): --  I&O's Summary    14 Feb 2019 07:01  -  15 Feb 2019 07:00  --------------------------------------------------------  IN: 1320 mL / OUT: 0 mL / NET: 1320 mL        Appearance: Normal	  HEENT:   Normal oral mucosa, PERRL, EOMI	  Lymphatic: No lymphadenopathy  Cardiovascular: Normal S1 S2, No JVD, No murmurs, No edema  Respiratory: Lungs clear to auscultation	  Psychiatry: A & O x 3, Mood & affect appropriate  Gastrointestinal:  Soft, Non-tender, + BS	  Skin: No rashes, No ecchymoses, No cyanosis	  Neurologic: Non-focal  Extremities: Normal range of motion, No clubbing, cyanosis or edema  Vascular: Peripheral pulses palpable 2+ bilaterally    TELEMETRY: 	    ECG:  	  RADIOLOGY:  OTHER: 	  	  LABS:	 	    CARDIAC MARKERS:                                13.1   5.69  )-----------( 170      ( 15 Feb 2019 09:08 )             42.4     02-15    139  |  102  |  21  ----------------------------<  121<H>  4.0   |  22  |  0.77    Ca    9.2      15 Feb 2019 06:53      proBNP:   Lipid Profile:   HgA1c:   TSH:

## 2019-02-15 NOTE — PROGRESS NOTE ADULT - ASSESSMENT
78F s/p b/l VA stents 06/2018 on ASA/Plavix in ER for posterior headache and 2 episodes of double vision / gait ataxia that have somewhat improved since yesterday. CTH/CTA WNL, no evidence of Stent occlusion. Intact now but still subjective gait difficulty  - cont ASA/Plavix  - MRI/MRA NOVA / C-spine reviewed  - Shows good flow in posterior circulation and no evidence of stent thrombosis or stroke  - C spine shows moderate degenerative changes most obvious at C5-6 but patient remains neurologically intact and relatively asymptomatic.  - Recommend outpatient f/u with Dr. Scott and Dr. Baxter after discharge

## 2019-02-15 NOTE — PHYSICAL THERAPY INITIAL EVALUATION ADULT - ADDITIONAL COMMENTS
As per pt, pt lives in a private house alone and 4 steps to enter w/ UHR. PTA pt was independent w/ all mobility and ADLs.

## 2019-02-15 NOTE — PHYSICAL THERAPY INITIAL EVALUATION ADULT - PLANNED THERAPY INTERVENTIONS, PT EVAL
GOAL: Pt will perform 10 stairs with or without U HR as needed within 3-4weeks./transfer training/balance training/gait training/bed mobility training

## 2019-02-16 LAB
CHOLEST SERPL-MCNC: 158 MG/DL — SIGNIFICANT CHANGE UP (ref 10–199)
HDLC SERPL-MCNC: 49 MG/DL — LOW
LIPID PNL WITH DIRECT LDL SERPL: 61 MG/DL — SIGNIFICANT CHANGE UP
TOTAL CHOLESTEROL/HDL RATIO MEASUREMENT: 3.2 RATIO — LOW (ref 3.3–7.1)
TRIGL SERPL-MCNC: 241 MG/DL — HIGH (ref 10–149)

## 2019-04-22 ENCOUNTER — APPOINTMENT (OUTPATIENT)
Dept: CARDIOLOGY | Facility: CLINIC | Age: 79
End: 2019-04-22
Payer: MEDICARE

## 2019-04-22 ENCOUNTER — NON-APPOINTMENT (OUTPATIENT)
Age: 79
End: 2019-04-22

## 2019-04-22 VITALS
OXYGEN SATURATION: 99 % | DIASTOLIC BLOOD PRESSURE: 80 MMHG | HEART RATE: 58 BPM | BODY MASS INDEX: 25.58 KG/M2 | SYSTOLIC BLOOD PRESSURE: 178 MMHG | WEIGHT: 149 LBS

## 2019-04-22 VITALS — SYSTOLIC BLOOD PRESSURE: 150 MMHG | DIASTOLIC BLOOD PRESSURE: 65 MMHG | HEART RATE: 56 BPM

## 2019-04-22 PROCEDURE — 99215 OFFICE O/P EST HI 40 MIN: CPT

## 2019-04-22 PROCEDURE — 93000 ELECTROCARDIOGRAM COMPLETE: CPT

## 2019-04-22 NOTE — PHYSICAL EXAM
[General Appearance - Well Developed] : well developed [Normal Appearance] : normal appearance [Well Groomed] : well groomed [General Appearance - Well Nourished] : well nourished [No Deformities] : no deformities [General Appearance - In No Acute Distress] : no acute distress [Normal Conjunctiva] : the conjunctiva exhibited no abnormalities [Eyelids - No Xanthelasma] : the eyelids demonstrated no xanthelasmas [Normal Oral Mucosa] : normal oral mucosa [No Oral Pallor] : no oral pallor [No Oral Cyanosis] : no oral cyanosis [Normal Jugular Venous A Waves Present] : normal jugular venous A waves present [Normal Jugular Venous V Waves Present] : normal jugular venous V waves present [No Jugular Venous [place] A Waves] : no jugular venous sanford A waves [Respiration, Rhythm And Depth] : normal respiratory rhythm and effort [Exaggerated Use Of Accessory Muscles For Inspiration] : no accessory muscle use [Auscultation Breath Sounds / Voice Sounds] : lungs were clear to auscultation bilaterally [Not Palpable] : not palpable [Normal Rate] : normal [Normal S1] : normal S1 [Normal S2] : normal S2 [No Gallop] : no gallop heard [Early] : early [II] : a grade 2 [Crescendo-Decrescendo] : crescendo-decrescendo [Base] : the murmur was transmitted to the base [2+] : left 2+ [1+] : right 1+ [No Abnormalities] : the abdominal aorta was not enlarged and no bruit was heard [0] : left 0 [___ +] : bilateral [unfilled]U+ pitting edema to the ankles [Abdomen Soft] : soft [Abdomen Tenderness] : non-tender [Abdomen Mass (___ Cm)] : no abdominal mass palpated [Abnormal Walk] : normal gait [Cyanosis, Localized] : no localized cyanosis [Nail Clubbing] : no clubbing of the fingernails [Petechial Hemorrhages (___cm)] : no petechial hemorrhages [] : no rash [Skin Color & Pigmentation] : normal skin color and pigmentation [Skin Lesions] : no skin lesions [No Venous Stasis] : no venous stasis [No Skin Ulcers] : no skin ulcer [No Xanthoma] : no  xanthoma was observed [Affect] : the affect was normal [Oriented To Time, Place, And Person] : oriented to person, place, and time [Mood] : the mood was normal [No Anxiety] : not feeling anxious [Click] : no click [Left Carotid Bruit] : no bruit heard over the left carotid [Right Carotid Bruit] : no bruit heard over the right carotid [FreeTextEntry1] : extensive ecchymosis at right femoral access site

## 2019-04-22 NOTE — HISTORY OF PRESENT ILLNESS
[FreeTextEntry1] : Mrs. Suzan Watkins is a 78-year-old woman with ongoing management of coronary artery disease with coronary stent placement several years ago and recurrence of chest pain and pain across her back with exertion and demonstrated severe in-stent restenosis of the right postero-lateral branch and managed with placement of drug eluting stent. To evaluate another recurrence of symptoms referred for repeat coronary angiography and there was no re-stenosis or other occlusive disease. She subsequently began pulmonary evaluation and therapy and lost significant amount of weight with marked improvement and she continues walking for exercise. She has no palpitations. 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 Shortly after last visit few weeks ago began experiencing exertional angina, "like an elephant sitting on my chest" walking short distance and especially climbing flight of stairs. Presented to [facility] and underwent another intervention on re-stenosis in vicinity of posterolateral artery. Has no angina since and no dyspnea with routine exertion.\par \par Presented with "7 blackouts" described as beginning 5/1/2018, stereotyped episodes of sudden severe occipital pain - "like somewhat hit me with a shovel in the back of my head" followed by total blindness lasting ~30 seconds and followed by ataxia. 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 Subsequent further symptoms, then subsided. Had returned to walking regularly. However again having episodes of severe occipital headache with vertigo and even when not acutely symptomatic balance is impaired. Episodes are fairly consistently provoked by leaning head back against a surface and putting pressure on back of head.\par \par \par \par

## 2019-04-22 NOTE — REVIEW OF SYSTEMS
[Headache] : headache [Loss Of Hearing] : hearing loss [Joint Pain] : joint pain [Muscle Cramps] : muscle cramps [Lower Back Pain] : lower back pain [Shoulder Problem] : shoulder problems [Numbness (Hypesthesia)] : numbness [Easy Bruising] : a tendency for easy bruising [Negative] : Heme/Lymph [Shortness Of Breath] : no shortness of breath [Chest Pain] : no chest pain [Dyspnea on exertion] : not dyspnea during exertion [Lower Ext Edema] : no extremity edema [Leg Claudication] : no intermittent leg claudication [Palpitations] : no palpitations [FreeTextEntry2] : severe sciatic pain right leg

## 2019-04-22 NOTE — DISCUSSION/SUMMARY
[Coronary Artery Disease] : coronary artery disease [Essential Hypertension] : essential hypertension [Responding to Treatment] : responding to treatment [Peripheral Vascular Disease] : peripheral vascular disease [Stable] : stable [None] : none [Patient] : the patient [de-identified] : dual antiplatelet therapy  [FreeTextEntry1] : \par Left vertebral artery stent deemed patent and some of worst neurologic symptoms improved, but still has episodes of severe occipital headache and vertigo. Should remain on uninterrupted dual antiplatelet therapy. There has never been any indication of cardiac arrhythmia including monitoring in hospital while symptoms occurring. Has no reason to suspect intra-cardiac thrombus and recurrent virtually identical episodes is inconsistent with cardioembolic explanation.

## 2019-04-22 NOTE — REASON FOR VISIT
[Follow-Up - Clinic] : a clinic follow-up of [Coronary Artery Disease] : coronary artery disease [Angina Pectoris] : angina pectoris [Hyperlipidemia] : hyperlipidemia [Hypertension] : hypertension

## 2019-07-01 ENCOUNTER — APPOINTMENT (OUTPATIENT)
Dept: NEUROSURGERY | Facility: CLINIC | Age: 79
End: 2019-07-01
Payer: MEDICARE

## 2019-07-01 VITALS
RESPIRATION RATE: 17 BRPM | HEART RATE: 56 BPM | DIASTOLIC BLOOD PRESSURE: 70 MMHG | WEIGHT: 149 LBS | SYSTOLIC BLOOD PRESSURE: 147 MMHG | BODY MASS INDEX: 25.44 KG/M2 | TEMPERATURE: 98.5 F | OXYGEN SATURATION: 95 % | HEIGHT: 64 IN

## 2019-07-01 DIAGNOSIS — I65.02 OCCLUSION AND STENOSIS OF LEFT VERTEBRAL ARTERY: ICD-10-CM

## 2019-07-01 PROCEDURE — 99214 OFFICE O/P EST MOD 30 MIN: CPT

## 2019-07-01 NOTE — PHYSICAL EXAM
[General Appearance - Alert] : alert [General Appearance - In No Acute Distress] : in no acute distress [Oriented To Time, Place, And Person] : oriented to person, place, and time [Person] : oriented to person [Place] : oriented to place [Time] : oriented to time [Cranial Nerves Optic (II)] : visual acuity intact bilaterally,  pupils equal round and reactive to light [Cranial Nerves Oculomotor (III)] : extraocular motion intact [Cranial Nerves Trigeminal (V)] : facial sensation intact symmetrically [Cranial Nerves Facial (VII)] : face symmetrical [Cranial Nerves Vestibulocochlear (VIII)] : hearing was intact bilaterally [Cranial Nerves Glossopharyngeal (IX)] : tongue and palate midline [Cranial Nerves Accessory (XI - Cranial And Spinal)] : head turning and shoulder shrug symmetric [Cranial Nerves Hypoglossal (XII)] : there was no tongue deviation with protrusion [Motor Strength] : muscle strength was normal in all four extremities [Involuntary Movements] : no involuntary movements were seen [] : no respiratory distress [Abnormal Walk] : normal gait

## 2019-07-03 NOTE — CONSULT LETTER
[Consult Closing:] : Thank you very much for allowing me to participate in the care of this patient.  If you have any questions, please do not hesitate to contact me. [Dear  ___] : Dear  [unfilled], [Consult Letter:] : I had the pleasure of evaluating your patient, [unfilled]. [DrJorge  ___] : Dr. HUMPHREYS [DrJorge ___] : Dr. HUMPHREYS [___] : [unfilled] [FreeTextEntry1] : As you know she is a 77 year old  female with a past medical history of CAD s/p multiple stents on dual anti-platelet therapy, hypertension, hyperlipidemia, migraines and degenerative disc disease.  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  Quantitative MRA with NOVA demonstrated a low flow condition in her basilar and right PCA indicating she was at high risk for a posterior circulation infarct.  She underwent stenting of both lesions 6/14/2018 which she tolerated well.  A post-operative QMRA now demonstrates normal flow in the basilar artery.  She returned to her neurological baseline. In August she began experiencing episode of dizziness again, so I proceeded with cerebral angiography 8/8/2018.  The angiogram showed no in stent stenosis. Most recently she underwent quantitative MRA NOVA which demonstrated normal total vertebral artery flow, basilar artery flow and posterior cerebral artery flow. At this time I recommend MRA NOVA to be done June 2020 then follow up in the office after.  [FreeTextEntry2] : Zenobia Adkins [FreeTextEntry3] : \par Sincerely,\par \par Jose Scott MD\par Professor of Neurological Surgery and Radiology\par  Department of Neurosurgery\par Director Cerebrovascular Surgery\par [employer] [employer] at [facility]\par

## 2019-07-03 NOTE — REASON FOR VISIT
[Follow-Up: _____] : a [unfilled] follow-up visit [Family Member] : family member [FreeTextEntry1] : Sona Watkins is a 79yr old female here for a follow up visit after recent imaging consisting of MRA head with NOVA. While she was in the MRI machine she had a severe vertigo attack. She was then referred to Dr. Avitia neurology at [place]. She denies any motor, sensory, speech, visual abnormalities.

## 2019-07-03 NOTE — ASSESSMENT
[FreeTextEntry1] : Impression: 79yr old female s/p 6/14/2018 stent and angioplasty of left vertebral artery stenosis \par \par Plan: \par Reviewed patients MRA head with NOVA which demonstrates normal total vertebral artery flow, basilar artery flow and posterior cerebral artery flow \par cerebral angiogram 8/8/2018 showed no in stent stenosis of L vertebral artery\par Continue Plavix 75mg daily most recent ( June 13, 2018) ( September 2018 from [place])\par Continue Aspirin 325mg daily\par Her vertigo can not be explain by lack of blood flow because her blood flow is normal intracranially. \par Follow up with Dr. Avitia in regards to the vertigo (atypical migraines is what he suspects it is)\par MRA brain non con with NOVA June 2020 has to get done with anesthesia at [facility]

## 2019-08-05 ENCOUNTER — NON-APPOINTMENT (OUTPATIENT)
Age: 79
End: 2019-08-05

## 2019-08-05 ENCOUNTER — APPOINTMENT (OUTPATIENT)
Dept: CARDIOLOGY | Facility: CLINIC | Age: 79
End: 2019-08-05
Payer: MEDICARE

## 2019-08-05 VITALS
BODY MASS INDEX: 25.61 KG/M2 | OXYGEN SATURATION: 98 % | HEART RATE: 70 BPM | HEIGHT: 64 IN | WEIGHT: 150 LBS | SYSTOLIC BLOOD PRESSURE: 138 MMHG | RESPIRATION RATE: 16 BRPM | DIASTOLIC BLOOD PRESSURE: 72 MMHG

## 2019-08-05 PROCEDURE — 99215 OFFICE O/P EST HI 40 MIN: CPT

## 2019-08-05 PROCEDURE — 93000 ELECTROCARDIOGRAM COMPLETE: CPT

## 2019-08-05 NOTE — REASON FOR VISIT
[Follow-Up - Clinic] : a clinic follow-up of [Angina Pectoris] : angina pectoris [Coronary Artery Disease] : coronary artery disease [Hyperlipidemia] : hyperlipidemia [Hypertension] : hypertension

## 2019-08-16 ENCOUNTER — OUTPATIENT (OUTPATIENT)
Dept: OUTPATIENT SERVICES | Facility: HOSPITAL | Age: 79
LOS: 1 days | End: 2019-08-16
Payer: MEDICARE

## 2019-08-16 DIAGNOSIS — I65.09 OCCLUSION AND STENOSIS OF UNSPECIFIED VERTEBRAL ARTERY: Chronic | ICD-10-CM

## 2019-08-16 PROCEDURE — 74177 CT ABD & PELVIS W/CONTRAST: CPT | Mod: 26

## 2019-08-16 PROCEDURE — 71046 X-RAY EXAM CHEST 2 VIEWS: CPT | Mod: 26

## 2019-08-16 PROCEDURE — 71100 X-RAY EXAM RIBS UNI 2 VIEWS: CPT | Mod: 26,LT

## 2019-08-23 ENCOUNTER — CLINICAL ADVICE (OUTPATIENT)
Age: 79
End: 2019-08-23

## 2019-08-23 NOTE — DISCUSSION/SUMMARY
[First Degree A-V Block] : first degree AV block [Coronary Artery Disease] : coronary artery disease [Essential Hypertension] : essential hypertension [Stable] : stable [Responding to Treatment] : responding to treatment [None] : none [Patient] : the patient [de-identified] : dual antiplatelet therapy  [de-identified] : internist added hydrochlorothiazide 25 mg to Avalide and did not feel well (why?) then decreased to 12.5 mg and feels well

## 2019-08-23 NOTE — REVIEW OF SYSTEMS
[Headache] : headache [Loss Of Hearing] : hearing loss [Joint Pain] : joint pain [Muscle Cramps] : muscle cramps [Lower Back Pain] : lower back pain [Shoulder Problem] : shoulder problems [Numbness (Hypesthesia)] : numbness [Easy Bruising] : a tendency for easy bruising [Negative] : Endocrine [Shortness Of Breath] : no shortness of breath [Dyspnea on exertion] : not dyspnea during exertion [Chest Pain] : no chest pain [Lower Ext Edema] : no extremity edema [Leg Claudication] : no intermittent leg claudication [Palpitations] : no palpitations [FreeTextEntry2] : severe sciatic pain right leg

## 2019-08-23 NOTE — PHYSICAL EXAM
[General Appearance - Well Developed] : well developed [Normal Appearance] : normal appearance [Well Groomed] : well groomed [General Appearance - Well Nourished] : well nourished [No Deformities] : no deformities [General Appearance - In No Acute Distress] : no acute distress [Eyelids - No Xanthelasma] : the eyelids demonstrated no xanthelasmas [Normal Conjunctiva] : the conjunctiva exhibited no abnormalities [Normal Oral Mucosa] : normal oral mucosa [No Oral Pallor] : no oral pallor [Normal Jugular Venous A Waves Present] : normal jugular venous A waves present [No Oral Cyanosis] : no oral cyanosis [No Jugular Venous Sanford A Waves] : no jugular venous sanford A waves [Normal Jugular Venous V Waves Present] : normal jugular venous V waves present [Respiration, Rhythm And Depth] : normal respiratory rhythm and effort [Auscultation Breath Sounds / Voice Sounds] : lungs were clear to auscultation bilaterally [Exaggerated Use Of Accessory Muscles For Inspiration] : no accessory muscle use [Not Palpable] : not palpable [Normal Rate] : normal [Normal S1] : normal S1 [Normal S2] : normal S2 [No Gallop] : no gallop heard [Crescendo-Decrescendo] : crescendo-decrescendo [II] : a grade 2 [Base] : the murmur was transmitted to the base [Early] : early [2+] : left 2+ [1+] : right 1+ [0] : right 0 [___ +] : bilateral [unfilled]U+ pitting edema to the ankles [No Abnormalities] : the abdominal aorta was not enlarged and no bruit was heard [Abdomen Soft] : soft [Abdomen Tenderness] : non-tender [Abnormal Walk] : normal gait [Abdomen Mass (___ Cm)] : no abdominal mass palpated [Petechial Hemorrhages (___cm)] : no petechial hemorrhages [Nail Clubbing] : no clubbing of the fingernails [Cyanosis, Localized] : no localized cyanosis [Skin Color & Pigmentation] : normal skin color and pigmentation [No Venous Stasis] : no venous stasis [] : no rash [Skin Lesions] : no skin lesions [No Skin Ulcers] : no skin ulcer [No Xanthoma] : no  xanthoma was observed [Oriented To Time, Place, And Person] : oriented to person, place, and time [Affect] : the affect was normal [Mood] : the mood was normal [No Anxiety] : not feeling anxious [Right Carotid Bruit] : no bruit heard over the right carotid [Click] : no click [Left Carotid Bruit] : no bruit heard over the left carotid [FreeTextEntry1] : extensive ecchymosis at right femoral access site

## 2019-08-23 NOTE — HISTORY OF PRESENT ILLNESS
[FreeTextEntry1] : Mrs. Suzan Watkins is a 79-year-old woman with ongoing management of coronary artery disease with coronary stent placement several years ago and recurrence of chest pain and pain across her back with exertion and demonstrated severe in-stent restenosis of the right postero-lateral branch and managed with placement of drug eluting stent. To evaluate another recurrence of symptoms referred for repeat coronary angiography and there was no re-stenosis or other occlusive disease. She subsequently began pulmonary evaluation and therapy and lost significant amount of weight with marked improvement and she continues walking for exercise. She has no palpitations. 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 Shortly after last visit few weeks ago began experiencing exertional angina, "like an elephant sitting on my chest" walking short distance and especially climbing flight of stairs. Presented to [facility] and underwent another intervention on re-stenosis in vicinity of posterolateral artery. Has no angina since and no dyspnea with routine exertion.\par \par Presented with "7 blackouts" described as beginning 5/1/2018, stereotyped episodes of sudden severe occipital pain - "like somewhat hit me with a shovel in the back of my head" followed by total blindness lasting ~30 seconds and followed by ataxia. 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 Subsequent further symptoms, then subsided. Had returned to walking regularly. However again having episodes of severe occipital headache with vertigo and even when not acutely symptomatic balance is impaired. Episodes are fairly consistently provoked by leaning head back against a surface and putting pressure on back of head.\par \par \par \par

## 2019-10-07 ENCOUNTER — NON-APPOINTMENT (OUTPATIENT)
Age: 79
End: 2019-10-07

## 2019-10-07 ENCOUNTER — APPOINTMENT (OUTPATIENT)
Dept: CARDIOLOGY | Facility: CLINIC | Age: 79
End: 2019-10-07
Payer: MEDICARE

## 2019-10-07 VITALS — HEART RATE: 72 BPM | SYSTOLIC BLOOD PRESSURE: 130 MMHG | OXYGEN SATURATION: 97 % | DIASTOLIC BLOOD PRESSURE: 60 MMHG

## 2019-10-07 VITALS — SYSTOLIC BLOOD PRESSURE: 122 MMHG | HEART RATE: 72 BPM | DIASTOLIC BLOOD PRESSURE: 56 MMHG | OXYGEN SATURATION: 97 %

## 2019-10-07 PROCEDURE — 99215 OFFICE O/P EST HI 40 MIN: CPT

## 2019-10-07 PROCEDURE — 93000 ELECTROCARDIOGRAM COMPLETE: CPT

## 2019-10-07 NOTE — HISTORY OF PRESENT ILLNESS
[FreeTextEntry1] : Mrs. Suzan Watkins is a 79-year-old woman with ongoing management of coronary artery disease with coronary stent placement several years ago and recurrence of chest pain and pain across her back with exertion and demonstrated severe in-stent restenosis of the right postero-lateral branch and managed with placement of drug eluting stent. To evaluate another recurrence of symptoms referred for repeat coronary angiography and there was no re-stenosis or other occlusive disease. She subsequently began pulmonary evaluation and therapy and lost significant amount of weight with marked improvement and she continues walking for exercise. She has no palpitations. 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 Shortly after last visit few weeks ago began experiencing exertional angina, "like an elephant sitting on my chest" walking short distance and especially climbing flight of stairs. Presented to [facility] and underwent another intervention on re-stenosis in vicinity of posterolateral artery. Has no angina since and no dyspnea with routine exertion.\par \par Presented with "7 blackouts" described as beginning 5/1/2018, stereotyped episodes of sudden severe occipital pain - "like somewhat hit me with a shovel in the back of my head" followed by total blindness lasting ~30 seconds and followed by ataxia. 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 Subsequent further symptoms, then subsided. Had returned to walking regularly. However again having episodes of severe occipital headache with vertigo and even when not acutely symptomatic balance is impaired. Episodes are fairly consistently provoked by leaning head back against a surface and putting pressure on back of head.\par \par Had procedure on neck and still evaluating response, but no episodes have occurred since. Also had colonoscopy and has multiple polyps and needs to return for resection off clopidogrel.\par \par \par \par

## 2019-10-07 NOTE — REVIEW OF SYSTEMS
[Headache] : headache [Loss Of Hearing] : hearing loss [Joint Pain] : joint pain [Muscle Cramps] : muscle cramps [Shoulder Problem] : shoulder problems [Lower Back Pain] : lower back pain [Numbness (Hypesthesia)] : numbness [Easy Bruising] : a tendency for easy bruising [Negative] : Heme/Lymph [Shortness Of Breath] : no shortness of breath [Dyspnea on exertion] : not dyspnea during exertion [Chest Pain] : no chest pain [Leg Claudication] : no intermittent leg claudication [Lower Ext Edema] : no extremity edema [Palpitations] : no palpitations [FreeTextEntry2] : severe sciatic pain right leg

## 2019-10-07 NOTE — DISCUSSION/SUMMARY
[Coronary Artery Disease] : coronary artery disease [Peripheral Vascular Disease] : peripheral vascular disease [Stable] : stable [None] : none [de-identified] : dual antiplatelet therapy, but can stop clopidogrel for 5 days prior to colonoscopic resection of polyps.  [FreeTextEntry1] : \par Left vertebral artery stent and no recurrence of black out spells with transient blindness. Should remain on uninterrupted dual antiplatelet therapy of minimum of 6 months and preferably a year and now greater than 15 months, thus can stop clopidogrel for 5 days in anticipation of colonoscopic resection of polyps, but must remain on aspirin without interruption.

## 2019-10-07 NOTE — PHYSICAL EXAM
[General Appearance - Well Developed] : well developed [Normal Appearance] : normal appearance [Well Groomed] : well groomed [General Appearance - Well Nourished] : well nourished [No Deformities] : no deformities [General Appearance - In No Acute Distress] : no acute distress [Normal Conjunctiva] : the conjunctiva exhibited no abnormalities [Eyelids - No Xanthelasma] : the eyelids demonstrated no xanthelasmas [Normal Oral Mucosa] : normal oral mucosa [No Oral Pallor] : no oral pallor [No Oral Cyanosis] : no oral cyanosis [Normal Jugular Venous A Waves Present] : normal jugular venous A waves present [Normal Jugular Venous V Waves Present] : normal jugular venous V waves present [No Jugular Venous Sanford A Waves] : no jugular venous sanford A waves [Respiration, Rhythm And Depth] : normal respiratory rhythm and effort [Exaggerated Use Of Accessory Muscles For Inspiration] : no accessory muscle use [Auscultation Breath Sounds / Voice Sounds] : lungs were clear to auscultation bilaterally [Not Palpable] : not palpable [Normal Rate] : normal [Normal S1] : normal S1 [Normal S2] : normal S2 [No Gallop] : no gallop heard [II] : a grade 2 [Crescendo-Decrescendo] : crescendo-decrescendo [Early] : early [Base] : the murmur was transmitted to the base [2+] : left 2+ [1+] : right 1+ [0] : left 0 [No Abnormalities] : the abdominal aorta was not enlarged and no bruit was heard [Abdomen Soft] : soft [Abdomen Tenderness] : non-tender [Abdomen Mass (___ Cm)] : no abdominal mass palpated [Abnormal Walk] : normal gait [Nail Clubbing] : no clubbing of the fingernails [Cyanosis, Localized] : no localized cyanosis [Petechial Hemorrhages (___cm)] : no petechial hemorrhages [Skin Color & Pigmentation] : normal skin color and pigmentation [] : no rash [No Venous Stasis] : no venous stasis [Skin Lesions] : no skin lesions [No Skin Ulcers] : no skin ulcer [No Xanthoma] : no  xanthoma was observed [Oriented To Time, Place, And Person] : oriented to person, place, and time [Affect] : the affect was normal [Mood] : the mood was normal [No Anxiety] : not feeling anxious [___ +] : [unfilled]U+ pitting edema to L ankle [Click] : no click [Right Carotid Bruit] : no bruit heard over the right carotid [Left Carotid Bruit] : no bruit heard over the left carotid [FreeTextEntry1] : extensive ecchymosis at right femoral access site

## 2020-03-21 NOTE — ED PROVIDER NOTE - MUSCULOSKELETAL, MLM
Minimize qt prolonging meds. Tele while in house.   Spine appears normal, range of motion is not limited, no muscle or joint tenderness

## 2020-03-31 NOTE — CHART NOTE - NSCHARTNOTEFT_GEN_A_CORE
Interventional Neuro- Radiology   Procedure Note      Procedure: Selective Cerebral Angiography   Pre- Procedure Diagnosis: Vertebral artery stenosis s/p stenting   Post- Procedure Diagnosis: Patent vertebral artery, no in stent stenosis     : Dr. Tyler MD  Fellow: Dr. Tiffanie Briones MD  Physician Assistant: Gisele Magallon PA-C, Etta Vasquez PA-C    RN: Nnacy     Anesthesia: Dr. Tommy MD (MAC)     I/Os:  Fluids: 100cc  Contrast: 37cc  Estimated Blood Loss: <10cc    Preliminary Report:  Under MAC, using a 5Fr sheath to the right groin examination of left vertebral artery right vertebral artery via selective cerebral angiography demonstrates patent vertebral artery, no in stent stenosis. ( Official note to follow).    Patient tolerated procedure well, vital signs stable, hemodynamically stable, no change in neurological status compared to baseline. Results discussed with patient and their family. Groin sheath d/c'ed, manual compression held to hemostasis, no active bleeding, no hematoma, quick clot and safeguard balloon dressing applied at 15:30h. Patient transferred to PACU for further care/ monitoring.     Gisele Magallon PA-C  [phone]

## 2020-05-18 ENCOUNTER — APPOINTMENT (OUTPATIENT)
Dept: CARDIOLOGY | Facility: CLINIC | Age: 80
End: 2020-05-18
Payer: MEDICARE

## 2020-05-18 ENCOUNTER — NON-APPOINTMENT (OUTPATIENT)
Age: 80
End: 2020-05-18

## 2020-05-18 VITALS
BODY MASS INDEX: 23.39 KG/M2 | HEIGHT: 64 IN | SYSTOLIC BLOOD PRESSURE: 134 MMHG | HEART RATE: 66 BPM | DIASTOLIC BLOOD PRESSURE: 72 MMHG | WEIGHT: 137 LBS | OXYGEN SATURATION: 98 %

## 2020-05-18 PROCEDURE — 93000 ELECTROCARDIOGRAM COMPLETE: CPT

## 2020-05-18 PROCEDURE — 99215 OFFICE O/P EST HI 40 MIN: CPT

## 2020-05-18 NOTE — HISTORY OF PRESENT ILLNESS
[FreeTextEntry1] : Mrs. Suzan Watkins is a 79-year-old woman with ongoing management of coronary artery disease with coronary stent placement several years ago and recurrence of chest pain and pain across her back with exertion and demonstrated severe in-stent restenosis of the right postero-lateral branch and managed with placement of drug eluting stent. To evaluate another recurrence of symptoms referred for repeat coronary angiography and there was no re-stenosis or other occlusive disease. She subsequently began pulmonary evaluation and therapy and lost significant amount of weight with marked improvement and she continues walking for exercise. She has no palpitations. 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 Shortly after last visit few weeks ago began experiencing exertional angina, "like an elephant sitting on my chest" walking short distance and especially climbing flight of stairs. Presented to [facility] and underwent another intervention on re-stenosis in vicinity of posterolateral artery. Has no angina since and no dyspnea with routine exertion.\par \par Presented with "7 blackouts" described as beginning 5/1/2018, stereotyped episodes of sudden severe occipital pain - "like somewhat hit me with a shovel in the back of my head" followed by total blindness lasting ~30 seconds and followed by ataxia. 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 Subsequent further symptoms, then subsided. Had returned to walking regularly. However again having episodes of severe occipital headache with vertigo and even when not acutely symptomatic balance is impaired. Episodes are fairly consistently provoked by leaning head back against a surface and putting pressure on back of head.\par \par Has periodic procedures on neck with some response, but episodes still provoked, especially by lying head fully back, during dental cleanings, CT scans if lies flat. Also has had serial colonoscopies and multiple polyps resected and needs to return for resection of three more polyps off clopidogrel.\par \par \par \par

## 2020-05-18 NOTE — REVIEW OF SYSTEMS
[Headache] : headache [Loss Of Hearing] : hearing loss [Joint Pain] : joint pain [Muscle Cramps] : muscle cramps [Shoulder Problem] : shoulder problems [Lower Back Pain] : lower back pain [Numbness (Hypesthesia)] : numbness [Easy Bruising] : a tendency for easy bruising [Negative] : Heme/Lymph [Dizziness] : dizziness [Shortness Of Breath] : no shortness of breath [Dyspnea on exertion] : not dyspnea during exertion [Lower Ext Edema] : no extremity edema [Chest Pain] : no chest pain [Leg Claudication] : no intermittent leg claudication [Palpitations] : no palpitations [FreeTextEntry2] : severe sciatic pain right leg

## 2020-05-18 NOTE — DISCUSSION/SUMMARY
[Coronary Artery Disease] : coronary artery disease [Hyperlipidemia] : hyperlipidemia [Essential Hypertension] : essential hypertension [Responding to Treatment] : responding to treatment [Peripheral Vascular Disease] : peripheral vascular disease [Stable] : stable [None] : none [Patient] : the patient [de-identified] : internist added hydrochlorothiazide 12.5 mg to irbesartan/HCT [de-identified] : dual antiplatelet therapy, but can stop clopidogrel for 5 days prior to colonoscopic resection of polyps.

## 2020-05-18 NOTE — PHYSICAL EXAM
[Normal Appearance] : normal appearance [General Appearance - Well Developed] : well developed [General Appearance - Well Nourished] : well nourished [Well Groomed] : well groomed [No Deformities] : no deformities [General Appearance - In No Acute Distress] : no acute distress [Normal Conjunctiva] : the conjunctiva exhibited no abnormalities [Eyelids - No Xanthelasma] : the eyelids demonstrated no xanthelasmas [Normal Oral Mucosa] : normal oral mucosa [No Oral Pallor] : no oral pallor [No Oral Cyanosis] : no oral cyanosis [Normal Jugular Venous V Waves Present] : normal jugular venous V waves present [Normal Jugular Venous A Waves Present] : normal jugular venous A waves present [No Jugular Venous Sanford A Waves] : no jugular venous sanford A waves [Respiration, Rhythm And Depth] : normal respiratory rhythm and effort [Auscultation Breath Sounds / Voice Sounds] : lungs were clear to auscultation bilaterally [Exaggerated Use Of Accessory Muscles For Inspiration] : no accessory muscle use [Not Palpable] : not palpable [Normal Rate] : normal [Normal S2] : normal S2 [Normal S1] : normal S1 [No Gallop] : no gallop heard [II] : a grade 2 [Crescendo-Decrescendo] : crescendo-decrescendo [Early] : early [Base] : the murmur was transmitted to the base [2+] : left 2+ [1+] : right 1+ [0] : right 0 [___ +] : [unfilled]U+ pitting edema to L ankle [No Abnormalities] : the abdominal aorta was not enlarged and no bruit was heard [Abdomen Soft] : soft [Abdomen Tenderness] : non-tender [Abdomen Mass (___ Cm)] : no abdominal mass palpated [Abnormal Walk] : normal gait [Nail Clubbing] : no clubbing of the fingernails [Cyanosis, Localized] : no localized cyanosis [Petechial Hemorrhages (___cm)] : no petechial hemorrhages [Skin Color & Pigmentation] : normal skin color and pigmentation [] : no rash [No Venous Stasis] : no venous stasis [Skin Lesions] : no skin lesions [No Skin Ulcers] : no skin ulcer [No Xanthoma] : no  xanthoma was observed [Oriented To Time, Place, And Person] : oriented to person, place, and time [Mood] : the mood was normal [Affect] : the affect was normal [No Anxiety] : not feeling anxious [Click] : no click [Left Carotid Bruit] : no bruit heard over the left carotid [Right Carotid Bruit] : no bruit heard over the right carotid [FreeTextEntry1] : extensive ecchymosis at right femoral access site

## 2020-07-21 ENCOUNTER — APPOINTMENT (OUTPATIENT)
Dept: SURGERY | Facility: CLINIC | Age: 80
End: 2020-07-21
Payer: MEDICARE

## 2020-07-21 VITALS
OXYGEN SATURATION: 98 % | BODY MASS INDEX: 23.39 KG/M2 | WEIGHT: 137 LBS | HEART RATE: 73 BPM | RESPIRATION RATE: 15 BRPM | SYSTOLIC BLOOD PRESSURE: 153 MMHG | DIASTOLIC BLOOD PRESSURE: 73 MMHG | TEMPERATURE: 98.2 F | HEIGHT: 64 IN

## 2020-07-21 DIAGNOSIS — Z78.9 OTHER SPECIFIED HEALTH STATUS: ICD-10-CM

## 2020-07-21 DIAGNOSIS — Z83.3 FAMILY HISTORY OF DIABETES MELLITUS: ICD-10-CM

## 2020-07-21 PROCEDURE — 99204 OFFICE O/P NEW MOD 45 MIN: CPT

## 2020-07-21 RX ORDER — KETOROLAC TROMETHAMINE 4 MG/ML
0.4 SOLUTION/ DROPS OPHTHALMIC
Qty: 5 | Refills: 0 | Status: DISCONTINUED | COMMUNITY
Start: 2017-08-23 | End: 2020-07-21

## 2020-07-21 RX ORDER — BLOOD SUGAR DIAGNOSTIC
STRIP MISCELLANEOUS
Qty: 50 | Refills: 0 | Status: DISCONTINUED | COMMUNITY
Start: 2017-12-20 | End: 2020-07-21

## 2020-07-21 RX ORDER — LANCETS 30 GAUGE
EACH MISCELLANEOUS
Qty: 100 | Refills: 0 | Status: DISCONTINUED | COMMUNITY
Start: 2017-12-20 | End: 2020-07-21

## 2020-07-21 RX ORDER — ESTRADIOL 0.1 MG/G
0.1 CREAM VAGINAL
Qty: 42 | Refills: 0 | Status: DISCONTINUED | COMMUNITY
Start: 2018-04-27 | End: 2020-07-21

## 2020-07-21 RX ORDER — HYDROCHLOROTHIAZIDE 12.5 MG/1
12.5 CAPSULE ORAL
Qty: 90 | Refills: 3 | Status: DISCONTINUED | COMMUNITY
Start: 2019-08-05 | End: 2020-07-21

## 2020-07-21 RX ORDER — TAPENTADOL HYDROCHLORIDE 50 MG/1
50 TABLET, FILM COATED ORAL
Qty: 30 | Refills: 0 | Status: DISCONTINUED | COMMUNITY
Start: 2018-04-17 | End: 2020-07-21

## 2020-07-21 RX ORDER — BLOOD-GLUCOSE METER
W/DEVICE KIT MISCELLANEOUS
Qty: 1 | Refills: 0 | Status: DISCONTINUED | COMMUNITY
Start: 2017-12-20 | End: 2020-07-21

## 2020-07-21 RX ORDER — TRAMADOL HYDROCHLORIDE 50 MG/1
50 TABLET, COATED ORAL
Qty: 30 | Refills: 0 | Status: DISCONTINUED | COMMUNITY
Start: 2017-10-26 | End: 2020-07-21

## 2020-07-21 RX ORDER — FROVATRIPTAN SUCCINATE 2.5 MG/1
2.5 TABLET, FILM COATED ORAL
Qty: 9 | Refills: 0 | Status: DISCONTINUED | COMMUNITY
Start: 2017-08-31 | End: 2020-07-21

## 2020-07-21 NOTE — CONSULT LETTER
[Dear  ___] : Dear ~GRAEME, [Please see my note below.] : Please see my note below. [Courtesy Letter:] : I had the pleasure of seeing your patient, [unfilled], in my office today. [Sincerely,] : Sincerely, [Consult Closing:] : Thank you very much for allowing me to participate in the care of this patient.  If you have any questions, please do not hesitate to contact me. [FreeTextEntry2] : Dr. Vernon Ngo [FreeTextEntry3] : Vipul Martinez M.D., ANGEL.AMINA., F.SIL.S.MANDIRJorgeS.\[facility] Chief Colorectal Clinical Services, [facility] [DrJorge  ___] : Dr. HUMPHREYS

## 2020-07-21 NOTE — ASSESSMENT
[FreeTextEntry1] : I have seen and evaluated patient and I have corroborated all nursing input into this note.  Patient requires brain MRI and has a retained colon clip.  The  states that continuous MRI is safe up to 15 minutes.  The patient will discuss with radiology and see if she can have the MRI in 15-minute continuous segments with breaks in between.  I informed the patient that I have seen retained clips fall off sometimes 1 to 2 years after placement.  Since there was difficulty with attempted endoscopic removal at the last procedure and concern for tearing of the colon it would be best if radiology can make the appropriate adjustment with their scanning protocol.  I recommended a follow-up colonoscopy at 6 months from the previous exam.  The patient inquired if the colonoscopy can be arranged through my office and I discussed the indications, risks, benefits, alternatives including but not limited to bleeding, perforation, and continued presence of the clip.  All questions were answered.

## 2020-07-21 NOTE — HISTORY OF PRESENT ILLNESS
[FreeTextEntry1] : Sona is a 79 y/o female here for consultation visit for colon polyps.\par \par Colonoscopy from 8/28/19 demonstrated polyps (4 mm to 1.8 cm) in the cecum, appendiceal orifice, hepatic flexure, transverse colon and descending colon. Diverticulosis of the sigmoid colon. Otherwise unremarkable colonoscopy. \par \par Colonoscopy from 11/5/19 demonstrated polyps (4 mm to 1.1 cm) in the ascending colon, hepatic flexure and transverse colon. (Polypectomy). Polyps (3 mm to 5 mm) in the cecum and ascending colon. (Polypectomy). In spite of stopping the Plavix x 5 days and on ASA only the polypectomy sites had adherent clots, one with oozing blood, therefore it was decided to place clips on all snared polypectomy sites. There were several polyps left behind and deferred for a later date. Moderate diverticulosis of the sigmoid colon. Otherwise unremarkable colonoscopy. Pathology: Tubular adenoma x9. \par \par Colonoscopy from 6/23/20 demonstrated mild diverticulosis of the sigmoid colon. Polyps (3 mm to 7 mm) in the distal ascending colon, hepatic flexure and transverse colon. (Polypectomy). Prior polypectomy site in region of SF @ 60 cm's from anal verge with retained ENDOCLIP and approx.. 3 mm of surrounding recurrent adenomatous tissue Vs granulation tissue was identified. Attempt to raise it with Elivu and saline unsuccessful removal by cold snare. Cold bx of site was obtained. Pathology: Descending colon polyp, transverse colon polyp and hepatic flexure polyp demonstrated tubular adenoma. Splenic flexure polyp, cold biopsy demonstrated granulation tissue polyp. \par \par Patient needs brain MRI and clip  recommends MRI no longer than 15 minutes of continuous scanning.  Patient has bowel movements every 2 to 3 days.

## 2020-07-21 NOTE — PHYSICAL EXAM
[Normal Breath Sounds] : Normal breath sounds [Normal Heart Sounds] : normal heart sounds [Normal Rate and Rhythm] : normal rate and rhythm [No Rash or Lesion] : No rash or lesion [Alert] : alert [Oriented to Person] : oriented to person [Calm] : calm [Oriented to Time] : oriented to time [Oriented to Place] : oriented to place [No HSM] : no hepatosplenomegaly [Abdomen Masses] : No abdominal masses [Abdomen Tenderness] : ~T No ~M abdominal tenderness [JVD] : no jugular venous distention  [de-identified] : WNL [de-identified] : Well nourished female, in no apparent distress [de-identified] : Full ROM

## 2020-09-18 NOTE — PROGRESS NOTE ADULT - PROBLEM SELECTOR PROBLEM 3
Syncope, unspecified syncope type
18-Sep-2020 12:45

## 2020-11-09 ENCOUNTER — APPOINTMENT (OUTPATIENT)
Dept: CARDIOLOGY | Facility: CLINIC | Age: 80
End: 2020-11-09
Payer: MEDICARE

## 2020-11-09 ENCOUNTER — NON-APPOINTMENT (OUTPATIENT)
Age: 80
End: 2020-11-09

## 2020-11-09 VITALS — HEART RATE: 56 BPM | SYSTOLIC BLOOD PRESSURE: 140 MMHG | DIASTOLIC BLOOD PRESSURE: 70 MMHG

## 2020-11-09 VITALS
SYSTOLIC BLOOD PRESSURE: 158 MMHG | WEIGHT: 130 LBS | HEART RATE: 55 BPM | DIASTOLIC BLOOD PRESSURE: 78 MMHG | OXYGEN SATURATION: 100 % | BODY MASS INDEX: 22.31 KG/M2 | TEMPERATURE: 97.1 F

## 2020-11-09 PROCEDURE — 93000 ELECTROCARDIOGRAM COMPLETE: CPT

## 2020-11-09 PROCEDURE — 99215 OFFICE O/P EST HI 40 MIN: CPT

## 2020-11-09 RX ORDER — HYDROCHLOROTHIAZIDE 12.5 MG/1
12.5 TABLET ORAL
Qty: 90 | Refills: 0 | Status: DISCONTINUED | COMMUNITY
Start: 2020-05-30

## 2020-11-09 RX ORDER — IBUPROFEN 600 MG/1
600 TABLET, FILM COATED ORAL
Qty: 30 | Refills: 0 | Status: COMPLETED | COMMUNITY
Start: 2020-07-27

## 2020-11-09 RX ORDER — METHYLPREDNISOLONE 4 MG/1
4 TABLET ORAL
Qty: 21 | Refills: 0 | Status: COMPLETED | COMMUNITY
Start: 2020-07-22

## 2020-11-09 RX ORDER — DICLOFENAC SODIUM 1% 10 MG/G
1 GEL TOPICAL
Qty: 100 | Refills: 0 | Status: COMPLETED | COMMUNITY
Start: 2020-07-22

## 2020-11-09 NOTE — HISTORY OF PRESENT ILLNESS
[FreeTextEntry1] : Mrs. Suzan Watkins is a 80-year-old woman with ongoing management of coronary artery disease with coronary stent placement several years ago and recurrence of chest pain and pain across her back with exertion and demonstrated severe in-stent restenosis of the right postero-lateral branch and managed with placement of drug eluting stent. To evaluate another recurrence of symptoms referred for repeat coronary angiography and there was no re-stenosis or other occlusive disease. She subsequently began pulmonary evaluation and therapy and lost significant amount of weight with marked improvement and she continues walking for exercise. She has no palpitations. 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 Shortly after last visit few weeks ago began experiencing exertional angina, "like an elephant sitting on my chest" walking short distance and especially climbing flight of stairs. Presented to [facility] and underwent another intervention on re-stenosis in vicinity of posterolateral artery. Has no angina since and no dyspnea with routine exertion.\par \par Presented with "7 blackouts" described as beginning 5/1/2018, stereotyped episodes of sudden severe occipital pain - "like somewhat hit me with a shovel in the back of my head" followed by total blindness lasting ~30 seconds and followed by ataxia. 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 Subsequent further symptoms, then subsided. Had returned to walking regularly. However again having episodes of severe occipital headache with vertigo and even when not acutely symptomatic balance is impaired. Episodes are fairly consistently provoked by leaning head back against a surface and putting pressure on back of head.\par \par Continues to have periodic procedures on neck with some response, but episodes still provoked, especially by lying head fully back, during dental cleanings, CT scans if lies flat. Also has had serial colonoscopies and multiple polyps resected. Ongoing issue, unable to have MRI because of potential to mobilized colon clip.\par \par \par \par

## 2020-11-09 NOTE — REVIEW OF SYSTEMS
[Headache] : headache [Loss Of Hearing] : hearing loss [Joint Pain] : joint pain [Muscle Cramps] : muscle cramps [Shoulder Problem] : shoulder problems [Lower Back Pain] : lower back pain [Dizziness] : dizziness [Numbness (Hypesthesia)] : numbness [Easy Bruising] : a tendency for easy bruising [Negative] : Heme/Lymph [Shortness Of Breath] : no shortness of breath [Dyspnea on exertion] : not dyspnea during exertion [Chest Pain] : no chest pain [Lower Ext Edema] : no extremity edema [Leg Claudication] : no intermittent leg claudication [Palpitations] : no palpitations [FreeTextEntry2] : severe sciatic pain right leg

## 2020-11-09 NOTE — PHYSICAL EXAM
[General Appearance - Well Developed] : well developed [Normal Appearance] : normal appearance [Well Groomed] : well groomed [General Appearance - Well Nourished] : well nourished [No Deformities] : no deformities [General Appearance - In No Acute Distress] : no acute distress [Normal Conjunctiva] : the conjunctiva exhibited no abnormalities [Eyelids - No Xanthelasma] : the eyelids demonstrated no xanthelasmas [Normal Oral Mucosa] : normal oral mucosa [No Oral Pallor] : no oral pallor [No Oral Cyanosis] : no oral cyanosis [Normal Jugular Venous A Waves Present] : normal jugular venous A waves present [Normal Jugular Venous V Waves Present] : normal jugular venous V waves present [No Jugular Venous Sanford A Waves] : no jugular venous sanford A waves [Respiration, Rhythm And Depth] : normal respiratory rhythm and effort [Exaggerated Use Of Accessory Muscles For Inspiration] : no accessory muscle use [Auscultation Breath Sounds / Voice Sounds] : lungs were clear to auscultation bilaterally [Not Palpable] : not palpable [Normal Rate] : normal [Normal S1] : normal S1 [Normal S2] : normal S2 [No Gallop] : no gallop heard [II] : a grade 2 [Crescendo-Decrescendo] : crescendo-decrescendo [Early] : early [Base] : the murmur was transmitted to the base [2+] : left 2+ [1+] : right 1+ [0] : left 0 [No Abnormalities] : the abdominal aorta was not enlarged and no bruit was heard [___ +] : [unfilled]U+ pitting edema to L ankle [Abdomen Soft] : soft [Abdomen Tenderness] : non-tender [Abdomen Mass (___ Cm)] : no abdominal mass palpated [Abnormal Walk] : normal gait [Nail Clubbing] : no clubbing of the fingernails [Cyanosis, Localized] : no localized cyanosis [Petechial Hemorrhages (___cm)] : no petechial hemorrhages [Skin Color & Pigmentation] : normal skin color and pigmentation [] : no rash [No Venous Stasis] : no venous stasis [Skin Lesions] : no skin lesions [No Skin Ulcers] : no skin ulcer [No Xanthoma] : no  xanthoma was observed [Oriented To Time, Place, And Person] : oriented to person, place, and time [Affect] : the affect was normal [Mood] : the mood was normal [No Anxiety] : not feeling anxious [Click] : no click [Right Carotid Bruit] : no bruit heard over the right carotid [Left Carotid Bruit] : no bruit heard over the left carotid [FreeTextEntry1] : extensive ecchymosis at right femoral access site

## 2020-11-09 NOTE — DISCUSSION/SUMMARY
[First Degree A-V Block] : first degree AV block [Coronary Artery Disease] : coronary artery disease [Hyperlipidemia] : hyperlipidemia [Essential Hypertension] : essential hypertension [Responding to Treatment] : responding to treatment [Peripheral Vascular Disease] : peripheral vascular disease [Stable] : stable [None] : none [Patient] : the patient [de-identified] : dual antiplatelet therapy, but can stop clopidogrel for 5 days prior to colonoscopy and possible resection of polyps.

## 2020-12-04 ENCOUNTER — APPOINTMENT (OUTPATIENT)
Dept: DISASTER EMERGENCY | Facility: CLINIC | Age: 80
End: 2020-12-04

## 2020-12-21 DIAGNOSIS — Z01.818 ENCOUNTER FOR OTHER PREPROCEDURAL EXAMINATION: ICD-10-CM

## 2020-12-26 ENCOUNTER — APPOINTMENT (OUTPATIENT)
Dept: DISASTER EMERGENCY | Facility: CLINIC | Age: 80
End: 2020-12-26

## 2020-12-26 LAB — SARS-COV-2 N GENE NPH QL NAA+PROBE: NOT DETECTED

## 2020-12-27 NOTE — H&P ADULT - PROBLEM/PLAN-2
[Dear  ___] : Dear  [unfilled], [Courtesy Letter:] : I had the pleasure of seeing your patient, [unfilled], in my office today. [Please see my note below.] : Please see my note below. [Consult Closing:] : Thank you very much for allowing me to participate in the care of this patient.  If you have any questions, please do not hesitate to contact me. [Sincerely,] : Sincerely, DISPLAY PLAN FREE TEXT

## 2020-12-28 ENCOUNTER — APPOINTMENT (OUTPATIENT)
Dept: SURGERY | Facility: HOSPITAL | Age: 80
End: 2020-12-28
Payer: MEDICARE

## 2020-12-28 ENCOUNTER — RESULT REVIEW (OUTPATIENT)
Age: 80
End: 2020-12-28

## 2020-12-28 ENCOUNTER — OUTPATIENT (OUTPATIENT)
Dept: OUTPATIENT SERVICES | Facility: HOSPITAL | Age: 80
LOS: 1 days | End: 2020-12-28
Payer: MEDICARE

## 2020-12-28 VITALS
SYSTOLIC BLOOD PRESSURE: 137 MMHG | OXYGEN SATURATION: 99 % | DIASTOLIC BLOOD PRESSURE: 67 MMHG | RESPIRATION RATE: 16 BRPM | HEART RATE: 68 BPM

## 2020-12-28 VITALS
TEMPERATURE: 97 F | HEART RATE: 68 BPM | OXYGEN SATURATION: 97 % | SYSTOLIC BLOOD PRESSURE: 150 MMHG | DIASTOLIC BLOOD PRESSURE: 84 MMHG | RESPIRATION RATE: 20 BRPM | HEIGHT: 64 IN | WEIGHT: 134.04 LBS

## 2020-12-28 DIAGNOSIS — I65.09 OCCLUSION AND STENOSIS OF UNSPECIFIED VERTEBRAL ARTERY: Chronic | ICD-10-CM

## 2020-12-28 DIAGNOSIS — Z86.010 PERSONAL HISTORY OF COLONIC POLYPS: ICD-10-CM

## 2020-12-28 PROCEDURE — 88305 TISSUE EXAM BY PATHOLOGIST: CPT | Mod: 26

## 2020-12-28 PROCEDURE — 45385 COLONOSCOPY W/LESION REMOVAL: CPT

## 2020-12-28 PROCEDURE — 88305 TISSUE EXAM BY PATHOLOGIST: CPT

## 2020-12-28 PROCEDURE — 45385 COLONOSCOPY W/LESION REMOVAL: CPT | Mod: PT

## 2020-12-28 PROCEDURE — 45390 COLONOSCOPY W/RESECTION: CPT | Mod: 59

## 2020-12-28 PROCEDURE — 45380 COLONOSCOPY AND BIOPSY: CPT | Mod: 59

## 2020-12-28 PROCEDURE — 45380 COLONOSCOPY AND BIOPSY: CPT | Mod: PT,XS

## 2020-12-28 RX ORDER — SODIUM CHLORIDE 9 MG/ML
1000 INJECTION INTRAMUSCULAR; INTRAVENOUS; SUBCUTANEOUS
Refills: 0 | Status: DISCONTINUED | OUTPATIENT
Start: 2020-12-28 | End: 2021-01-11

## 2020-12-28 RX ADMIN — SODIUM CHLORIDE 75 MILLILITER(S): 9 INJECTION INTRAMUSCULAR; INTRAVENOUS; SUBCUTANEOUS at 09:34

## 2020-12-28 NOTE — ASU DISCHARGE PLAN (ADULT/PEDIATRIC) - CARE PROVIDER_API CALL
Vipul Martinez)  ColonRectal Surgery; Surgery  [address], [address]  [place] [address]  Phone: [phone]  Fax: [phone]  Follow Up Time:

## 2020-12-28 NOTE — PRE PROCEDURE NOTE - PRE PROCEDURE EVALUATION
Attending Physician:           Juan                 Procedure:  colonoscopy    Indication for Procedure:  history of polyps  ________________________________________________________  PAST MEDICAL & SURGICAL HISTORY:  Vertebral artery stenosis, unspecified laterality    Family history of coronary artery disease    Hypercholesteremia    Hypertension    MI (myocardial infarction)  2007    CAD (coronary artery disease)  s/p stents x2-2007    Vertebral artery stenosis  s/p b/l stents 6/14/18    S/P lumpectomy of breast  x5-benign tumors    S/P cholecystectomy    H/O vaginal surgery  robotic-Nov 2012    H/O fall  2.5 years ago multiple upper and lower injuries    Rib deformity  born with extra ribs, 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;s      ALLERGIES:  Reglan (Other)    HOME MEDICATIONS:  amLODIPine 10 mg oral tablet: 1 tab(s) orally once a day  aspirin 325 mg oral delayed release tablet: 1 tab(s) orally once a day  clopidogrel 75 mg oral tablet: 1 tab(s) orally once a day  Crestor 40 mg oral tablet: 1 tab(s) orally once a day (at bedtime)  cyanocobalamin 500 mcg oral tablet: 1 tab(s) orally once a day  irbesartan-hydrochlorothiazide 300mg-12.5mg oral tablet: 1 tab(s) orally once a day  metFORMIN 500 mg oral tablet: 1 tab(s) orally once a day  multivitamin: 1 tab(s) orally once a day  omeprazole 40 mg oral delayed release capsule: 1 cap(s) orally once a day    AICD/PPM: [ ] yes   [ ] no    PERTINENT LAB DATA:                      PHYSICAL EXAMINATION:    Height (cm): 162.6  Weight (kg): 60.8  BMI (kg/m2): 23  BSA (m2): 1.65T(C): 36.2  HR: 68  BP: 150/84  RR: 20  SpO2: 97%    Constitutional: NAD  HEENT: PERRLA, EOMI,    Neck:  No JVD  Respiratory: CTAB/L  Cardiovascular: S1 and S2  Gastrointestinal: BS+, soft, NT/ND  Extremities: No peripheral edema  Neurological: A/O x 3, no focal deficits  Psychiatric: Normal mood, normal affect  Skin: No rashes    ASA Class: I [ ]  II [x ]  III [ ]  IV [ ]    COMMENTS:    The patient is a suitable candidate for the planned procedure unless box checked [ ]  No, explain:

## 2020-12-28 NOTE — ASU DISCHARGE PLAN (ADULT/PEDIATRIC) - ASU DC SPECIAL INSTRUCTIONSFT
Please hold Plavix for 2 days after procedure    Please call Dr. Martinez's office in 1 week to follow up pathology.

## 2020-12-28 NOTE — PRE-ANESTHESIA EVALUATION ADULT - NSANTHOSAYNRD_GEN_A_CORE
No. OLRELEI screening performed.  STOP BANG Legend: 0-2 = LOW Risk; 3-4 = INTERMEDIATE Risk; 5-8 = HIGH Risk

## 2020-12-30 LAB — SURGICAL PATHOLOGY STUDY: SIGNIFICANT CHANGE UP

## 2021-03-01 ENCOUNTER — APPOINTMENT (OUTPATIENT)
Dept: RADIOLOGY | Facility: CLINIC | Age: 81
End: 2021-03-01
Payer: MEDICARE

## 2021-03-01 PROCEDURE — 73521 X-RAY EXAM HIPS BI 2 VIEWS: CPT

## 2021-05-10 ENCOUNTER — APPOINTMENT (OUTPATIENT)
Dept: CARDIOLOGY | Facility: CLINIC | Age: 81
End: 2021-05-10
Payer: MEDICARE

## 2021-05-10 ENCOUNTER — NON-APPOINTMENT (OUTPATIENT)
Age: 81
End: 2021-05-10

## 2021-05-10 VITALS
HEIGHT: 64 IN | RESPIRATION RATE: 17 BRPM | BODY MASS INDEX: 22.88 KG/M2 | HEART RATE: 62 BPM | DIASTOLIC BLOOD PRESSURE: 80 MMHG | WEIGHT: 134 LBS | TEMPERATURE: 97.3 F | OXYGEN SATURATION: 98 % | SYSTOLIC BLOOD PRESSURE: 136 MMHG

## 2021-05-10 VITALS — DIASTOLIC BLOOD PRESSURE: 70 MMHG | SYSTOLIC BLOOD PRESSURE: 140 MMHG | HEART RATE: 56 BPM

## 2021-05-10 PROCEDURE — 93000 ELECTROCARDIOGRAM COMPLETE: CPT

## 2021-05-10 PROCEDURE — 99214 OFFICE O/P EST MOD 30 MIN: CPT

## 2021-05-10 NOTE — DISCUSSION/SUMMARY
[First Degree A-V Block] : first degree AV block [Coronary Artery Disease] : coronary artery disease [Hyperlipidemia] : hyperlipidemia [Essential Hypertension] : essential hypertension [Responding to Treatment] : responding to treatment [Peripheral Vascular Disease] : peripheral vascular disease [Stable] : stable [None] : There are no changes in medication management [Patient] : the patient

## 2021-05-10 NOTE — PRE-ANESTHESIA EVALUATION ADULT - NSPROPOSEDPROCEDFT_GEN_ALL_CORE
colonoscopy
[FreeTextEntry1] : EOM's full.\par Vis fields intact to confrontation\par Near card OD: 20/25 -1, OS: 20/25-1\par facial sensation intact to LT, PP.\par \par \par Motor:\par R/L  del: 5/5, bi 5/5, tri 4+/4, we 5/5  IO 5-/4+\par R/L 0/0 in hip flexion, knee flexion/extension, foot DF/PF\par Reflexes 3+ brachioradialis, bicep, tricep, knee jerks, 2+ ankle right, 1+ ankle left, no clonus, toes downgoing.\par Sensory:  LT, PP VIbration intact in UE's.\par Spinal sensory level to LT, PP, Vib from T6 and below.\par has some sensation to pressure in LE's but LT, PP, vibration absent.

## 2021-05-10 NOTE — HISTORY OF PRESENT ILLNESS
[FreeTextEntry1] : Mrs. Suzan Watkins is a 80-year-old woman with ongoing management of coronary artery disease with coronary stent placement several years ago and recurrence of chest pain and pain across her back with exertion and demonstrated severe in-stent restenosis of the right postero-lateral branch and managed with placement of drug eluting stent. To evaluate another recurrence of symptoms referred for repeat coronary angiography and there was no re-stenosis or other occlusive disease. She subsequently began pulmonary evaluation and therapy and lost significant amount of weight with marked improvement and she continues walking for exercise. She has no palpitations. 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 Shortly after last visit few weeks ago began experiencing exertional angina, "like an elephant sitting on my chest" walking short distance and especially climbing flight of stairs. Presented to [facility] and underwent another intervention on re-stenosis in vicinity of posterolateral artery. Has no angina since and no dyspnea with routine exertion.\par \par Presented with "7 blackouts" described as beginning 5/1/2018, stereotyped episodes of sudden severe occipital pain - "like somewhat hit me with a shovel in the back of my head" followed by total blindness lasting ~30 seconds and followed by ataxia. 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 Subsequent further symptoms, then subsided. Had returned to walking regularly. However again having episodes of severe occipital headache with vertigo and even when not acutely symptomatic balance is impaired. Episodes are fairly consistently provoked by leaning head back against a surface and putting pressure on back of head.\par \par Had multiple procedures on neck with some response, but episodes still provoked, especially by lying head fully back, such as during dental cleanings, CT scans if lies flat. Now effectively avoiding the provocations. Also has had serial colonoscopies and multiple polyps resected.\par \par \par \par

## 2021-05-10 NOTE — REASON FOR VISIT
[Carotid, Aortic and Peripheral Vascular Disease] : carotid, aortic and peripheral vascular disease [Follow-Up - Clinic] : a clinic follow-up of [Angina Pectoris] : angina pectoris [Coronary Artery Disease] : coronary artery disease [Hyperlipidemia] : hyperlipidemia [Hypertension] : hypertension

## 2021-05-10 NOTE — REVIEW OF SYSTEMS
[Joint Pain] : joint pain [Ankle Problem] : ankle problems [Dizziness] : dizziness [Negative] : Heme/Lymph

## 2021-05-10 NOTE — CARDIOLOGY SUMMARY
[___] : [unfilled] 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 [de-identified] : 11/13/2014 patent RPL stent, 100% Cx-OM well collateralized, moderate mid-LAD lesion\par 11/8/2017, 7/29/2013 RPL ALEJANDRA

## 2021-05-10 NOTE — PHYSICAL EXAM
[Not Palpable] : not palpable [Normal Rate] : normal [Rhythm Regular] : regular [Normal S1] : normal S1 [Normal S2] : normal S2 [No Murmur] : no murmurs heard [II] : a grade 2 [___ +] : [unfilled]U+ pitting edema to L ankle [2+] : left 2+ [0] : left 0 [1+] : left 1+ [Normal Gait] : normal gait [Normal] : alert and oriented, normal memory [Click] : no click [Right Carotid Bruit] : no bruit heard over the right carotid [Left Carotid Bruit] : no bruit heard over the left carotid [de-identified] : bilateral cervical rib resection

## 2021-07-03 ENCOUNTER — EMERGENCY (EMERGENCY)
Facility: HOSPITAL | Age: 81
LOS: 1 days | End: 2021-07-03
Admitting: EMERGENCY MEDICINE
Payer: MEDICARE

## 2021-07-03 DIAGNOSIS — I65.09 OCCLUSION AND STENOSIS OF UNSPECIFIED VERTEBRAL ARTERY: Chronic | ICD-10-CM

## 2021-07-03 PROCEDURE — 99283 EMERGENCY DEPT VISIT LOW MDM: CPT

## 2021-07-04 NOTE — PHYSICAL THERAPY INITIAL EVALUATION ADULT - ADDITIONAL COMMENTS
6/13 MRA HEAD: Noninvasive vascular imaging demonstrating mild right vertebral artery stenosis and moderate to severe left vertebral artery stenosis.
Normal

## 2021-07-07 ENCOUNTER — APPOINTMENT (OUTPATIENT)
Dept: OPHTHALMOLOGY | Facility: CLINIC | Age: 81
End: 2021-07-07

## 2021-07-14 ENCOUNTER — APPOINTMENT (OUTPATIENT)
Dept: OPHTHALMOLOGY | Facility: CLINIC | Age: 81
End: 2021-07-14
Payer: MEDICARE

## 2021-07-14 ENCOUNTER — NON-APPOINTMENT (OUTPATIENT)
Age: 81
End: 2021-07-14

## 2021-07-14 PROCEDURE — 92014 COMPRE OPH EXAM EST PT 1/>: CPT

## 2021-07-14 PROCEDURE — 92134 CPTRZ OPH DX IMG PST SGM RTA: CPT

## 2021-08-10 NOTE — REASON FOR VISIT
No [Follow-Up - Clinic] : a clinic follow-up of [Angina Pectoris] : angina pectoris [Coronary Artery Disease] : coronary artery disease [Hyperlipidemia] : hyperlipidemia [Hypertension] : hypertension Yes

## 2021-09-21 ENCOUNTER — APPOINTMENT (OUTPATIENT)
Dept: RADIOLOGY | Facility: CLINIC | Age: 81
End: 2021-09-21
Payer: MEDICARE

## 2021-09-21 PROCEDURE — 71046 X-RAY EXAM CHEST 2 VIEWS: CPT

## 2021-10-13 ENCOUNTER — APPOINTMENT (OUTPATIENT)
Dept: ULTRASOUND IMAGING | Facility: CLINIC | Age: 81
End: 2021-10-13
Payer: MEDICARE

## 2021-10-13 PROCEDURE — 76770 US EXAM ABDO BACK WALL COMP: CPT

## 2021-11-08 ENCOUNTER — APPOINTMENT (OUTPATIENT)
Dept: CARDIOLOGY | Facility: CLINIC | Age: 81
End: 2021-11-08
Payer: MEDICARE

## 2021-11-08 VITALS
OXYGEN SATURATION: 97 % | BODY MASS INDEX: 22.02 KG/M2 | DIASTOLIC BLOOD PRESSURE: 70 MMHG | HEIGHT: 64 IN | HEART RATE: 66 BPM | SYSTOLIC BLOOD PRESSURE: 150 MMHG | WEIGHT: 129 LBS

## 2021-11-08 PROCEDURE — 99215 OFFICE O/P EST HI 40 MIN: CPT

## 2021-11-08 PROCEDURE — 93000 ELECTROCARDIOGRAM COMPLETE: CPT

## 2021-11-08 NOTE — PHYSICAL EXAM
[Not Palpable] : not palpable [Normal Rate] : normal [Rhythm Regular] : regular [Normal S1] : normal S1 [Normal S2] : normal S2 [II] : a grade 2 [___ +] : [unfilled]U+ pitting edema to L ankle [2+] : left 2+ [1+] : left 1+ [Normal Gait] : normal gait [Normal] : alert and oriented, normal memory [0] : right 0 [Click] : no click [Right Carotid Bruit] : no bruit heard over the right carotid [Left Carotid Bruit] : no bruit heard over the left carotid [de-identified] : bilateral cervical rib resection

## 2021-11-08 NOTE — HISTORY OF PRESENT ILLNESS
[FreeTextEntry1] : Mrs. Suzan Watkins is a 80-year-old woman with ongoing management of coronary artery disease with coronary stent placement several years ago and recurrence of chest pain and pain across her back with exertion and demonstrated severe in-stent restenosis of the right postero-lateral branch and managed with placement of drug eluting stent. To evaluate another recurrence of symptoms referred for repeat coronary angiography and there was no re-stenosis or other occlusive disease. She subsequently began pulmonary evaluation and therapy and lost significant amount of weight with marked improvement and she continues walking for exercise. She has no palpitations. 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 Shortly after last visit few weeks ago began experiencing exertional angina, "like an elephant sitting on my chest" walking short distance and especially climbing flight of stairs. Presented to [facility] and underwent another intervention on re-stenosis in vicinity of posterolateral artery. Has no angina since and no dyspnea with routine exertion.\par \par Presented with "7 blackouts" described as beginning 5/1/2018, stereotyped episodes of sudden severe occipital pain - "like somewhat hit me with a shovel in the back of my head" followed by total blindness lasting ~30 seconds and followed by ataxia. 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 Subsequent further symptoms, then subsided. Had returned to walking regularly. However again having episodes of severe occipital headache with vertigo and even when not acutely symptomatic balance is impaired. Episodes are fairly consistently provoked by leaning head back against a surface and putting pressure on back of head.\par \par Had multiple procedures on neck with some response, but episodes still provoked, especially by lying head fully back, such as during dental cleanings, CT scans if lies flat. Now effectively avoiding the provocations. Also has had serial colonoscopies and multiple polyps resected.\par \par Headaches controlled with Botox injections, having balance issues and another MRI planned. There has been no angina, no exertional dyspnea as continues exercise, walking, indoor equipment.\par \par \par

## 2021-11-08 NOTE — DISCUSSION/SUMMARY
[First Degree A-V Block] : first degree AV block [Coronary Artery Disease] : coronary artery disease [Hyperlipidemia] : hyperlipidemia [Diabetes Mellitus] : diabetes mellitus [Medication Changes Per Orders] : Medication changes are as documented in orders [Essential Hypertension] : essential hypertension [Responding to Treatment] : responding to treatment [Peripheral Vascular Disease] : peripheral vascular disease [Stable] : stable [None] : There are no changes in medication management [Patient] : the patient [de-identified] : systolic slightly higher today, has generally been well controlled and diastolic relatively low [de-identified] : 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

## 2021-11-08 NOTE — REVIEW OF SYSTEMS
[Joint Pain] : joint pain [Ankle Problem] : ankle problems [Dizziness] : dizziness [Negative] : Heme/Lymph [Numbness (Hypoesthesia)] : numbness [Tingling (Paresthesia)] : tingling

## 2021-11-08 NOTE — CARDIOLOGY SUMMARY
[___] : [unfilled] 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\par 11/8/2021 sinus rhythm, [place] .21 1st degree heart block, old inferoapical MI, 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 [de-identified] : 6/12/2018 - EF 70-75%, calcified aortic valve, normal opening. 1. Endocardium not well visualized; grossly normal left ventricular systolic function. 2. Reversal of the E-A  waves of the mitral inflow pattern is consistent with diastolic LV dysfunction. 3. The right ventricle is not well visualized; grossly normal right ventricular systolic function. 4. No pericardial effusion seen.  [de-identified] : 11/13/2014 patent RPL stent, 100% Cx-OM well collateralized, moderate mid-LAD lesion\par 11/8/2017, 7/29/2013 RPL ALEJANDRA

## 2021-11-23 DIAGNOSIS — Z95.5 PRESENCE OF CORONARY ANGIOPLASTY IMPLANT AND GRAFT: ICD-10-CM

## 2021-11-23 DIAGNOSIS — I25.2 OLD MYOCARDIAL INFARCTION: ICD-10-CM

## 2021-11-23 DIAGNOSIS — Z87.09 PERSONAL HISTORY OF OTHER DISEASES OF THE RESPIRATORY SYSTEM: ICD-10-CM

## 2021-11-23 DIAGNOSIS — J84.10 PULMONARY FIBROSIS, UNSPECIFIED: ICD-10-CM

## 2021-11-23 RX ORDER — METFORMIN HYDROCHLORIDE 500 MG/1
500 TABLET, COATED ORAL TWICE DAILY
Qty: 180 | Refills: 0 | Status: DISCONTINUED | COMMUNITY
Start: 2017-07-24 | End: 2021-11-23

## 2021-11-23 RX ORDER — EZETIMIBE 10 MG/1
10 TABLET ORAL
Qty: 90 | Refills: 3 | Status: DISCONTINUED | COMMUNITY
Start: 2021-11-08 | End: 2021-11-23

## 2021-11-23 RX ORDER — LIDOCAINE 5% 700 MG/1
5 PATCH TOPICAL
Qty: 30 | Refills: 0 | Status: DISCONTINUED | COMMUNITY
Start: 2021-02-10 | End: 2021-11-23

## 2021-11-23 RX ORDER — PNV NO.95/FERROUS FUM/FOLIC AC 28MG-0.8MG
TABLET ORAL
Refills: 0 | Status: DISCONTINUED | COMMUNITY
End: 2021-11-23

## 2021-11-24 ENCOUNTER — NON-APPOINTMENT (OUTPATIENT)
Age: 81
End: 2021-11-24

## 2021-11-29 DIAGNOSIS — I25.2 OLD MYOCARDIAL INFARCTION: ICD-10-CM

## 2021-11-29 DIAGNOSIS — Z86.79 PERSONAL HISTORY OF OTHER DISEASES OF THE CIRCULATORY SYSTEM: ICD-10-CM

## 2021-11-30 DIAGNOSIS — M54.81 OCCIPITAL NEURALGIA: ICD-10-CM

## 2021-12-08 DIAGNOSIS — Z86.010 PERSONAL HISTORY OF COLONIC POLYPS: ICD-10-CM

## 2022-01-03 DIAGNOSIS — Z12.11 ENCOUNTER FOR SCREENING FOR MALIGNANT NEOPLASM OF COLON: ICD-10-CM

## 2022-01-06 ENCOUNTER — APPOINTMENT (OUTPATIENT)
Dept: OPHTHALMOLOGY | Facility: CLINIC | Age: 82
End: 2022-01-06
Payer: MEDICARE

## 2022-01-06 ENCOUNTER — NON-APPOINTMENT (OUTPATIENT)
Age: 82
End: 2022-01-06

## 2022-01-06 PROCEDURE — 92014 COMPRE OPH EXAM EST PT 1/>: CPT

## 2022-01-06 PROCEDURE — 92250 FUNDUS PHOTOGRAPHY W/I&R: CPT

## 2022-02-07 ENCOUNTER — APPOINTMENT (OUTPATIENT)
Dept: SURGERY | Facility: HOSPITAL | Age: 82
End: 2022-02-07
Payer: MEDICARE

## 2022-02-07 ENCOUNTER — RESULT REVIEW (OUTPATIENT)
Age: 82
End: 2022-02-07

## 2022-02-07 ENCOUNTER — OUTPATIENT (OUTPATIENT)
Dept: OUTPATIENT SERVICES | Facility: HOSPITAL | Age: 82
LOS: 1 days | End: 2022-02-07
Payer: MEDICARE

## 2022-02-07 VITALS
WEIGHT: 130.07 LBS | TEMPERATURE: 98 F | RESPIRATION RATE: 18 BRPM | SYSTOLIC BLOOD PRESSURE: 185 MMHG | HEIGHT: 64 IN | OXYGEN SATURATION: 98 % | DIASTOLIC BLOOD PRESSURE: 84 MMHG | HEART RATE: 84 BPM

## 2022-02-07 VITALS
DIASTOLIC BLOOD PRESSURE: 62 MMHG | RESPIRATION RATE: 18 BRPM | SYSTOLIC BLOOD PRESSURE: 136 MMHG | HEART RATE: 62 BPM | OXYGEN SATURATION: 100 %

## 2022-02-07 DIAGNOSIS — I65.09 OCCLUSION AND STENOSIS OF UNSPECIFIED VERTEBRAL ARTERY: Chronic | ICD-10-CM

## 2022-02-07 DIAGNOSIS — Z86.010 PERSONAL HISTORY OF COLONIC POLYPS: ICD-10-CM

## 2022-02-07 LAB — GLUCOSE BLDC GLUCOMTR-MCNC: 104 MG/DL — HIGH (ref 70–99)

## 2022-02-07 PROCEDURE — 82962 GLUCOSE BLOOD TEST: CPT

## 2022-02-07 PROCEDURE — 45385 COLONOSCOPY W/LESION REMOVAL: CPT | Mod: PT

## 2022-02-07 PROCEDURE — 45385 COLONOSCOPY W/LESION REMOVAL: CPT

## 2022-02-07 PROCEDURE — 88305 TISSUE EXAM BY PATHOLOGIST: CPT | Mod: 26

## 2022-02-07 PROCEDURE — 45380 COLONOSCOPY AND BIOPSY: CPT | Mod: PT,XS

## 2022-02-07 PROCEDURE — 88305 TISSUE EXAM BY PATHOLOGIST: CPT

## 2022-02-07 DEVICE — NET RETRV ROT ROTH 2.5MMX230CM: Type: IMPLANTABLE DEVICE | Status: FUNCTIONAL

## 2022-02-07 RX ORDER — PREGABALIN 225 MG/1
1 CAPSULE ORAL
Qty: 0 | Refills: 0 | DISCHARGE

## 2022-02-07 RX ORDER — IRBESARTAN AND HYDROCHLOROTHIAZIDE 12.5; 3 MG/1; MG/1
1 TABLET ORAL
Qty: 0 | Refills: 0 | DISCHARGE

## 2022-02-07 RX ORDER — SODIUM CHLORIDE 9 MG/ML
500 INJECTION INTRAMUSCULAR; INTRAVENOUS; SUBCUTANEOUS
Refills: 0 | Status: DISCONTINUED | OUTPATIENT
Start: 2022-02-07 | End: 2022-02-21

## 2022-02-07 RX ORDER — ROSUVASTATIN CALCIUM 5 MG/1
1 TABLET ORAL
Qty: 0 | Refills: 0 | DISCHARGE

## 2022-02-07 RX ORDER — METFORMIN HYDROCHLORIDE 850 MG/1
1 TABLET ORAL
Qty: 0 | Refills: 0 | DISCHARGE

## 2022-02-07 NOTE — ASU PATIENT PROFILE, ADULT - FALL HARM RISK - UNIVERSAL INTERVENTIONS
Bed in lowest position, wheels locked, appropriate side rails in place/Call bell, personal items and telephone in reach/Instruct patient to call for assistance before getting out of bed or chair/Non-slip footwear when patient is out of bed/[place] to call system/Physically safe environment - no spills, clutter or unnecessary equipment/Purposeful Proactive Rounding/Room/bathroom lighting operational, light cord in reach

## 2022-02-07 NOTE — ASU DISCHARGE PLAN (ADULT/PEDIATRIC) - NS MD DC FALL RISK RISK
For information on Fall & Injury Prevention, visit: [email or web address] OR  [email or web address] OR  https://www.cdc.gov/steadi/patient.html

## 2022-02-07 NOTE — ASU DISCHARGE PLAN (ADULT/PEDIATRIC) - CALL YOUR DOCTOR IF YOU HAVE ANY OF THE FOLLOWING:
Bleeding that does not stop/Pain not relieved by Medications/Nausea and vomiting that does not stop/Excessive diarrhea/Inability to tolerate liquids or foods/Increased irritability or sluggishness

## 2022-02-07 NOTE — ASU DISCHARGE PLAN (ADULT/PEDIATRIC) - PATIENT EDUCATION MATERIALS PROVIED
Blood pressure is under good control.  We will continue the current regimen.  Will work on regular aerobic exercise and a low salt diet.       Provider pre-printed instructions given

## 2022-02-07 NOTE — ASU PATIENT PROFILE, ADULT - NSICDXPASTSURGICALHX_GEN_ALL_CORE_FT
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

## 2022-02-07 NOTE — ASU PATIENT PROFILE, ADULT - NSICDXPASTMEDICALHX_GEN_ALL_CORE_FT
PAST MEDICAL HISTORY:  CAD (coronary artery disease) s/p stents x2-2007    Family history of coronary artery disease     Hypercholesteremia     Hypertension     MI (myocardial infarction) 2007    Vertebral artery stenosis, unspecified laterality

## 2022-02-07 NOTE — PRE PROCEDURE NOTE - PRE PROCEDURE EVALUATION
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;s    H/O fall  2.5 years ago multiple upper and lower injuries    H/O vaginal surgery  robotic-Nov 2012    S/P cholecystectomy    S/P lumpectomy of breast  x5-benign tumors    Vertebral artery stenosis  s/p b/l stents 6/14/18      ALLERGIES:  Reglan (Other)    HOME MEDICATIONS:  amLODIPine 10 mg oral tablet: 1 tab(s) orally once a day  aspirin 325 mg oral delayed release tablet: 1 tab(s) orally once a day  clopidogrel 75 mg oral tablet: 1 tab(s) orally once a day  Crestor 40 mg oral tablet: 1 tab(s) orally once a day (at bedtime)  cyanocobalamin 500 mcg oral tablet: 1 tab(s) orally once a day  irbesartan-hydrochlorothiazide 300mg-12.5mg oral tablet: 1 tab(s) orally once a day  metFORMIN 500 mg oral tablet: 1 tab(s) orally once a day  multivitamin: 1 tab(s) orally once a day  omeprazole 40 mg oral delayed release capsule: 1 cap(s) orally once a day    AICD/PPM: [ ] yes   [ ] no    PERTINENT LAB DATA:                      PHYSICAL EXAMINATION:    T(C): --  HR: --  BP: --  RR: --  SpO2: --    Constitutional: NAD  HEENT: PERRLA, EOMI,    Neck:  No JVD  Respiratory: CTAB/L  Cardiovascular: S1 and S2  Gastrointestinal: BS+, soft, NT/ND  Extremities: No peripheral edema  Neurological: A/O x 3, no focal deficits  Psychiatric: Normal mood, normal affect  Skin: No rashes    ASA Class: I [ ]  II [ ]  III [ ]  IV [ ] per anesthesia    COMMENTS:    The patient is a suitable candidate for the planned procedure unless box checked [ ]  No, explain:

## 2022-02-07 NOTE — PRE-ANESTHESIA EVALUATION ADULT - NSANTHOSAYNRD_GEN_A_CORE
No. LORELEI screening performed.  STOP BANG Legend: 0-2 = LOW Risk; 3-4 = INTERMEDIATE Risk; 5-8 = HIGH Risk

## 2022-02-09 ENCOUNTER — NON-APPOINTMENT (OUTPATIENT)
Age: 82
End: 2022-02-09

## 2022-02-09 LAB — SURGICAL PATHOLOGY STUDY: SIGNIFICANT CHANGE UP

## 2022-03-14 ENCOUNTER — NON-APPOINTMENT (OUTPATIENT)
Age: 82
End: 2022-03-14

## 2022-03-14 ENCOUNTER — RESULT CHARGE (OUTPATIENT)
Age: 82
End: 2022-03-14

## 2022-03-15 ENCOUNTER — APPOINTMENT (OUTPATIENT)
Dept: CARDIOLOGY | Facility: CLINIC | Age: 82
End: 2022-03-15
Payer: MEDICARE

## 2022-03-15 ENCOUNTER — NON-APPOINTMENT (OUTPATIENT)
Age: 82
End: 2022-03-15

## 2022-03-15 VITALS
DIASTOLIC BLOOD PRESSURE: 72 MMHG | WEIGHT: 127 LBS | SYSTOLIC BLOOD PRESSURE: 149 MMHG | HEIGHT: 64 IN | OXYGEN SATURATION: 99 % | RESPIRATION RATE: 17 BRPM | HEART RATE: 59 BPM | BODY MASS INDEX: 21.68 KG/M2

## 2022-03-15 DIAGNOSIS — I63.9 CEREBRAL INFARCTION, UNSPECIFIED: ICD-10-CM

## 2022-03-15 PROCEDURE — 99215 OFFICE O/P EST HI 40 MIN: CPT

## 2022-03-15 PROCEDURE — 93000 ELECTROCARDIOGRAM COMPLETE: CPT

## 2022-03-15 RX ORDER — ROSUVASTATIN CALCIUM 40 MG/1
40 TABLET, FILM COATED ORAL
Qty: 90 | Refills: 3 | Status: ACTIVE | COMMUNITY
Start: 2022-03-15

## 2022-03-15 RX ORDER — AMLODIPINE BESYLATE 10 MG/1
10 TABLET ORAL
Qty: 90 | Refills: 3 | Status: ACTIVE | COMMUNITY
Start: 2022-03-15

## 2022-03-15 RX ORDER — EZETIMIBE 10 MG/1
10 TABLET ORAL
Qty: 90 | Refills: 3 | Status: ACTIVE | COMMUNITY
Start: 2022-03-15

## 2022-03-15 RX ORDER — IRBESARTAN AND HYDROCHLOROTHIAZIDE 300; 12.5 MG/1; MG/1
300-12.5 TABLET ORAL DAILY
Qty: 90 | Refills: 3 | Status: ACTIVE | COMMUNITY
Start: 2022-03-15

## 2022-03-15 RX ORDER — ASPIRIN 81 MG/1
81 TABLET, CHEWABLE ORAL
Qty: 100 | Refills: 0 | Status: ACTIVE | COMMUNITY
Start: 2022-03-15

## 2022-03-15 RX ORDER — OMEPRAZOLE 40 MG/1
40 CAPSULE, DELAYED RELEASE ORAL
Qty: 90 | Refills: 0 | Status: ACTIVE | COMMUNITY
Start: 2022-03-15

## 2022-03-15 RX ORDER — CLOPIDOGREL BISULFATE 75 MG/1
75 TABLET, FILM COATED ORAL
Qty: 90 | Refills: 3 | Status: ACTIVE | COMMUNITY
Start: 2022-03-15

## 2022-03-15 NOTE — REVIEW OF SYSTEMS
[Joint Pain] : joint pain [Ankle Problem] : ankle problems [Dizziness] : dizziness [Numbness (Hypoesthesia)] : numbness [Tingling (Paresthesia)] : tingling [Negative] : Heme/Lymph

## 2022-03-15 NOTE — CARDIOLOGY SUMMARY
[___] : [unfilled] 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\par 11/8/2021 sinus rhythm, OR .21 1st degree heart block, old inferoapical MI, 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\par 3/15/2022 sinus rhythm, OR .21 1st degree heart block, old inferoapical MI, 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 [de-identified] : 11/13/2014 patent RPL stent, 100% Cx-OM well collateralized, moderate mid-LAD lesion\par 11/8/2017, 7/29/2013 RPL ALEJANDRA [de-identified] : 6/12/2018 - EF 70-75%, calcified aortic valve, normal opening. 1. Endocardium not well visualized; grossly normal left ventricular systolic function. 2. Reversal of the E-A  waves of the mitral inflow pattern is consistent with diastolic LV dysfunction. 3. The right ventricle is not well visualized; grossly normal right ventricular systolic function. 4. No pericardial effusion seen.

## 2022-03-15 NOTE — DISCUSSION/SUMMARY
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 [FreeTextEntry1] : Thus has stable ischemic heart disease, controlled hypertension, has never manifested congestive heart failure and rhythm is stable. Functional capacity is satisfactory and thus there is no contraindication to lower back procedure and anesthesia. It is reasonable to remain off clopidogrel for 5 days prior to procedure and resume when hemostasis deemed adequate after, but she must remain on uninterrupted low dose aspirin. She should remain on all other cardiac medications.

## 2022-03-15 NOTE — HISTORY OF PRESENT ILLNESS
[FreeTextEntry1] : Mrs. Suzan Watkins is a 80-year-old woman with ongoing management of coronary artery disease with coronary stent placement several years ago and recurrence of chest pain and pain across her back with exertion and demonstrated severe in-stent restenosis of the right postero-lateral branch and managed with placement of drug eluting stent. To evaluate another recurrence of symptoms referred for repeat coronary angiography and there was no re-stenosis or other occlusive disease. She subsequently began pulmonary evaluation and therapy and lost significant amount of weight with marked improvement and she continues walking for exercise. She has no palpitations. 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 Shortly after last visit few weeks ago began experiencing exertional angina, "like an elephant sitting on my chest" walking short distance and especially climbing flight of stairs. Presented to [facility] and underwent another intervention on re-stenosis in vicinity of posterolateral artery. Has no angina since and no dyspnea with routine exertion.\par \par Presented with "7 blackouts" described as beginning 5/1/2018, stereotyped episodes of sudden severe occipital pain - "like somewhat hit me with a shovel in the back of my head" followed by total blindness lasting ~30 seconds and followed by ataxia. 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 Subsequent further symptoms, then subsided. Had returned to walking regularly. However again having episodes of severe occipital headache with vertigo and even when not acutely symptomatic balance is impaired. Episodes are fairly consistently provoked by leaning head back against a surface and putting pressure on back of head.\par \par Had multiple procedures on neck with some response, but episodes still provoked, especially by lying head fully back, such as during dental cleanings, CT scans if lies flat. Now effectively avoiding the provocations. Also has had serial colonoscopies and multiple polyps resected.\par \par Headaches controlled with Botox injections, having balance issues. However recently walking regularly, building back to former levels, at this time several blocks around community without chest pain or dyspnea. Now planning a procedure on her back which requires incision and anesthesia.\par \par

## 2022-03-15 NOTE — REASON FOR VISIT
[Coronary Artery Disease] : coronary artery disease [Carotid, Aortic and Peripheral Vascular Disease] : carotid, aortic and peripheral vascular disease [Hyperlipidemia] : hyperlipidemia [Hypertension] : hypertension [Other: ____] : [unfilled]

## 2022-03-15 NOTE — PHYSICAL EXAM
[Not Palpable] : not palpable [Normal Rate] : normal [Rhythm Regular] : regular [Normal S1] : normal S1 [Normal S2] : normal S2 [II] : a grade 2 [___ +] : [unfilled]U+ pitting edema to L ankle [2+] : left 2+ [0] : right 0 [1+] : left 1+ [Normal Gait] : normal gait [Normal] : alert and oriented, normal memory [Click] : no click [Right Carotid Bruit] : no bruit heard over the right carotid [Left Carotid Bruit] : no bruit heard over the left carotid [de-identified] : bilateral cervical rib resection

## 2022-05-16 ENCOUNTER — APPOINTMENT (OUTPATIENT)
Dept: CARDIOLOGY | Facility: CLINIC | Age: 82
End: 2022-05-16
Payer: MEDICARE

## 2022-05-16 ENCOUNTER — NON-APPOINTMENT (OUTPATIENT)
Age: 82
End: 2022-05-16

## 2022-05-16 VITALS
HEART RATE: 55 BPM | OXYGEN SATURATION: 99 % | HEIGHT: 64 IN | SYSTOLIC BLOOD PRESSURE: 140 MMHG | WEIGHT: 129 LBS | DIASTOLIC BLOOD PRESSURE: 68 MMHG | BODY MASS INDEX: 22.02 KG/M2

## 2022-05-16 PROCEDURE — 93000 ELECTROCARDIOGRAM COMPLETE: CPT

## 2022-05-16 PROCEDURE — 99214 OFFICE O/P EST MOD 30 MIN: CPT

## 2022-05-16 RX ORDER — ONDANSETRON 4 MG/1
4 TABLET, ORALLY DISINTEGRATING ORAL
Qty: 30 | Refills: 0 | Status: COMPLETED | COMMUNITY
Start: 2022-03-10

## 2022-05-16 RX ORDER — ONDANSETRON 4 MG/1
4 TABLET ORAL
Qty: 20 | Refills: 0 | Status: COMPLETED | COMMUNITY
Start: 2022-03-18

## 2022-05-16 NOTE — DISCUSSION/SUMMARY
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

## 2022-05-16 NOTE — CARDIOLOGY SUMMARY
[___] : [unfilled] 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\par 11/8/2021 sinus rhythm, MA .21 1st degree heart block, old inferoapical MI, 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\par 3/15/2022 sinus rhythm, MA .21 1st degree heart block, old inferoapical MI, 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\par 5/14/2022 sinus rhythm, MA .21 1st degree heart block, old inferoapical MI, 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 [de-identified] : 6/12/2018 - EF 70-75%, calcified aortic valve, normal opening. 1. Endocardium not well visualized; grossly normal left ventricular systolic function. 2. Reversal of the E-A  waves of the mitral inflow pattern is consistent with diastolic LV dysfunction. 3. The right ventricle is not well visualized; grossly normal right ventricular systolic function. 4. No pericardial effusion seen.  [de-identified] : 11/13/2014 patent RPL stent, 100% Cx-OM well collateralized, moderate mid-LAD lesion\par 11/8/2017, 7/29/2013 RPL ALEJANDRA

## 2022-05-16 NOTE — REASON FOR VISIT
[Hyperlipidemia] : hyperlipidemia [Hypertension] : hypertension [Coronary Artery Disease] : coronary artery disease [Carotid, Aortic and Peripheral Vascular Disease] : carotid, aortic and peripheral vascular disease [Other: ____] : [unfilled]

## 2022-05-16 NOTE — HISTORY OF PRESENT ILLNESS
[FreeTextEntry1] : Mrs. Suzan Watkins is a 81-year-old woman with ongoing management of coronary artery disease with coronary stent placement several years ago and recurrence of chest pain and pain across her back with exertion and demonstrated severe in-stent restenosis of the right postero-lateral branch and managed with placement of drug eluting stent. To evaluate another recurrence of symptoms referred for repeat coronary angiography and there was no re-stenosis or other occlusive disease. She subsequently began pulmonary evaluation and therapy and lost significant amount of weight with marked improvement and she continues walking for exercise. She has no palpitations. 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 Shortly after last visit few weeks ago began experiencing exertional angina, "like an elephant sitting on my chest" walking short distance and especially climbing flight of stairs. Presented to [facility] and underwent another intervention on re-stenosis in vicinity of posterolateral artery. Has no angina since and no dyspnea with routine exertion.\par \par Presented with "7 blackouts" described as beginning 5/1/2018, stereotyped episodes of sudden severe occipital pain - "like somewhat hit me with a shovel in the back of my head" followed by total blindness lasting ~30 seconds and followed by ataxia. 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 Subsequent further symptoms, then subsided. Had returned to walking regularly. However again having episodes of severe occipital headache with vertigo and even when not acutely symptomatic balance is impaired. Episodes are fairly consistently provoked by leaning head back against a surface and putting pressure on back of head.\par \par Had multiple procedures on neck with some response, but episodes still provoked, especially by lying head fully back, such as during dental cleanings, CT scans if lies flat. Now effectively avoiding the provocations. Also has had serial colonoscopies and multiple polyps resected.\par \par Headaches controlled with Botox injections, having balance issues. However recently walking regularly, building back to former levels, at this time several blocks around community without chest pain or dyspnea. Had multi-level procedure on her back, vertebral fusions and went well, symptoms improved, though will remain with some back pain and has sciatic pain.\par \par

## 2022-05-16 NOTE — PHYSICAL EXAM
[Not Palpable] : not palpable [Normal Rate] : normal [Rhythm Regular] : regular [Normal S1] : normal S1 [Normal S2] : normal S2 [II] : a grade 2 [___ +] : [unfilled]U+ pitting edema to L ankle [2+] : left 2+ [0] : right 0 [1+] : left 1+ [Normal Gait] : normal gait [Normal] : alert and oriented, normal memory [Click] : no click [Right Carotid Bruit] : no bruit heard over the right carotid [Left Carotid Bruit] : no bruit heard over the left carotid [de-identified] : bilateral cervical rib resection

## 2022-05-23 ENCOUNTER — NON-APPOINTMENT (OUTPATIENT)
Age: 82
End: 2022-05-23

## 2022-07-07 ENCOUNTER — NON-APPOINTMENT (OUTPATIENT)
Age: 82
End: 2022-07-07

## 2022-07-07 ENCOUNTER — APPOINTMENT (OUTPATIENT)
Dept: OPHTHALMOLOGY | Facility: CLINIC | Age: 82
End: 2022-07-07

## 2022-07-07 PROCEDURE — 92014 COMPRE OPH EXAM EST PT 1/>: CPT

## 2022-07-07 PROCEDURE — 92134 CPTRZ OPH DX IMG PST SGM RTA: CPT

## 2022-08-10 NOTE — ASU DISCHARGE PLAN (ADULT/PEDIATRIC) - DISCHARGE PLAN IS COMPLETE AND GIVEN TO PATIENT
Appointment for today is cancelled - caregiver unable to obtain OT RX prior to today's appointment. : Yes

## 2022-09-29 NOTE — PHYSICAL THERAPY INITIAL EVALUATION ADULT - PATIENT/FAMILY/SIGNIFICANT OTHER GOALS STATEMENT, PT EVAL
[facility] Hospitalist History and Physical Note  Chief Complaint   Patient presents with   • Dizziness       History Of Present Illness  Patient is a 53-year-old gentleman with a past medical history significant for chronic persistent back pain, hypertension, hyperlipidemia, CAD status post STEMI January 2021, prior CVA with no residual deficits who presented with chest pain/persistent back pain.  History was obtained for the patient at bedside.    Patient woke up this morning with 10/10 back pain with mild chest pain.  It was nonradiating.  It felt similar to his prior MI.  It was not with exertion.  It resolved with morphine in the emergency room.  There was no other alleviating/exacerbating factors.  It was sharp with no radiculopathy signs.  He was hemodynamically stable otherwise in the emergency.  Troponin slightly elevated.  EKG with no acute ischemic findings.  Still admits mild pain mainly on his back with movement.        Past Medical History  Past Medical History:   Diagnosis Date   • Acid reflux    • Anxiety    • Depression    • ED (erectile dysfunction)     Decreased libido   • GERD (gastroesophageal reflux disease)    • Heart attack (CMS/HCC)    • History of ischemic stroke 01/29/2021    Lt posterior, temporal area      • History of ST elevation myocardial infarction (STEMI) 01/29/2021   • HTN (hypertension)    • Hyperlipidemia    • Low back syndrome    • Major depressive disorder    • Myocardial infarction (CMS/HCC)    • PTSD (post-traumatic stress disorder)    • Stroke (CMS/HCC)    • Vitamin D deficiency         Surgical History  Past Surgical History:   Procedure Laterality Date   • Abdomen surgery     • Appendectomy     • Back surgery     • Cardiac catherization  01/29/2021    CORONARY ANGIOGRAM/POSSIBLE PTCA - CV with Stent   • Lasik surgery     • Sinus surgery          Social History  Social History     Tobacco Use   • Smoking status: Former Smoker     Types: Cigarettes     Quit date: 1/28/2021      Years since quittin.6   • Smokeless tobacco: Never Used   • Tobacco comment: Last smoked on 21   Vaping Use   • Vaping Use: never used   Substance Use Topics   • Alcohol use: No   • Drug use: Not Currently       Family History  Family History   Problem Relation Age of Onset   • High blood pressure Mother    • Diabetes Mother    • Kidney disease Mother    • Other Mother         Arthritis, Gout   • Hypertension Mother    • Heart disease Father    • High blood pressure Father    • Respiratory Father         copd   • Vascular Father         pad   • Diabetes Father    • Hyperlipidemia Father    • Asthma Sister    • Arthritis Sister         Allergies  ALLERGIES:  Hydrocodone-acetaminophen and Ace inhibitors      Home Medications:  Home Medications    Medication Directions Start Date End Date   citalopram (CeleXA) 40 MG tablet Take 40 mg by mouth daily.     gabapentin (NEURONTIN) 300 MG capsule Take 300 mg by mouth in the morning and 300 mg at noon and 300 mg in the evening.     diazePAM (VALIUM) 5 MG tablet Take 2.5 mg by mouth every 12 hours as needed for Anxiety.     Aspirin Low Dose 81 MG EC tablet TAKE 1 TABLET BY MOUTH EVERY DAY  Patient taking differently: Take 81 mg by mouth daily. 22    ezetimibe (ZETIA) 10 MG tablet TAKE 1 TABLET BY MOUTH DAILY 22    atorvastatin (LIPITOR) 80 MG tablet TAKE 1 TABLET BY MOUTH EVERY NIGHT  Patient taking differently: Take 80 mg by mouth nightly. 3/21/22    metoPROLOL succinate (TOPROL-XL) 25 MG 24 hr tablet Take 0.5 tablets by mouth daily. 10/5/21 9/29/22   vardenafil (LEVITRA) 20 MG tablet Take 1 tablet by mouth as needed for Erectile Dysfunction. 1/15/13        Inpatient Medications:  Current Facility-Administered Medications   Medication   • aspirin (ECOTRIN) enteric coated tablet 81 mg   • atorvastatin (LIPITOR) tablet 80 mg   • ezetimibe (ZETIA) tablet 10 mg   • metoPROLOL succinate (TOPROL-XL) ER tablet 12.5 mg   • pantoprazole (PROTONIX) EC tablet 40 mg   •  Magnesium Standard Replacement Protocol   • Phosphorus Standard Replacement Protocol   • ondansetron (ZOFRAN) injection 4 mg   • acetaminophen (TYLENOL) tablet 650 mg   • polyethylene glycol (MIRALAX) packet 17 g   • sodium chloride 0.9 % flush bag 25 mL   • [START ON 9/30/2022] enoxaparin (LOVENOX) injection 40 mg   • [Held by provider] aspirin (ECOTRIN) enteric coated tablet 81 mg   • citalopram (CeleXA) tablet 40 mg   • morphine injection 2 mg   • acetaminophen (TYLENOL) tablet 325 mg   • ketorolac injection 15 mg   • LORazepam (ATIVAN) tablet 0.5 mg       Review of Systems   Review of Systems   Constitutional: Negative for chills, diaphoresis, fever and weight loss.   HENT: Negative for congestion and sore throat.    Eyes: Negative for blurred vision.   Respiratory: Negative for cough, shortness of breath and wheezing.    Cardiovascular: Positive for chest pain. Negative for palpitations and orthopnea.   Gastrointestinal: Negative for abdominal pain, diarrhea, heartburn, nausea and vomiting.   Genitourinary: Negative for dysuria, frequency and urgency.   Musculoskeletal: Positive for back pain. Negative for myalgias.   Skin: Negative for rash.   Neurological: Negative for dizziness, weakness and headaches.   Psychiatric/Behavioral: Negative for depression and suicidal ideas.         Last Recorded Vitals  Vitals with min/max:  Body mass index is 27.43 kg/m².    Vital Last Value 24 Hour Range   Temperature 98.4 °F (36.9 °C) (09/29/22 1317) Temp  Min: 98.4 °F (36.9 °C)  Max: 99.5 °F (37.5 °C)   Pulse 72 (09/29/22 1317) Pulse  Min: 68  Max: 91   Respiratory 18 (09/29/22 1317) Resp  Min: 16  Max: 22   Non-Invasive  Blood Pressure 105/66 (09/29/22 1317) BP  Min: 105/66  Max: 134/68   Pulse Oximetry 96 % (09/29/22 1317) SpO2  Min: 94 %  Max: 98 %   Arterial   Blood Pressure   No data recorded      INTAKE/OUTPUT:    No intake or output data in the 24 hours ending 09/29/22 0696     Physical Exam  Constitutional:        Appearance: Normal appearance.   HENT:      Head: Normocephalic and atraumatic.      Nose: Nose normal.      Mouth/Throat:      Mouth: Mucous membranes are moist.      Neck: Normal range of motion and neck supple.   Eyes:      Extraocular Movements: Extraocular movements intact.      Pupils: Pupils are equal, round, and reactive to light.   Cardiovascular:      Rate and Rhythm: Normal rate and regular rhythm.      Pulses: Normal pulses.      Heart sounds: Normal heart sounds.   Pulmonary:      Effort: Pulmonary effort is normal.      Breath sounds: Normal breath sounds.   Abdominal:      General: Abdomen is flat.      Palpations: Abdomen is soft.   Musculoskeletal:         General: Normal range of motion.      Comments: Mild pain paravertebral thoracic/lumbar area with limited range of motion due to pain   Skin:     General: Skin is warm.      Capillary Refill: Capillary refill takes less than 2 seconds.   Neurological:      General: No focal deficit present.      Mental Status: He is alert and oriented to person, place, and time.   Psychiatric:         Mood and Affect: Mood normal.         Behavior: Behavior normal.         Imaging  XR CHEST PA OR AP 1 VIEW   Final Result by Alecia Tyson DO (09/29 0420)      No acute cardiopulmonary findings.      Electronically Signed by: ALECIA TYSON M.D.    Signed on: 9/29/2022 4:20 AM          MRI LUMBAR SPINE WO CONTRAST    (Results Pending)   MRI THORACIC SPINE WO CONTRAST    (Results Pending)         Cardiac studies:   Encounter Date: 09/29/22   ECG   Result Value    Ventricular Rate EKG/Min (BPM) 83    Atrial Rate (BPM) 83    OR-Interval (MSEC) 185    QRS-Interval (MSEC) 109    QT-Interval (MSEC) 396    QTc 466    P Axis (Degrees) 67    R Axis (Degrees) 32    T Axis (Degrees) 82    REPORT TEXT Sinus rhythm  LAE, consider biatrial enlargement             Labs   Recent Labs   Lab 09/29/22  0306   WBC 9.0   RBC 4.25*   HGB 14.5   HCT 40.9   MCV 96.2         Recent Labs   Lab 09/29/22  0306   SODIUM 135   POTASSIUM 3.8   CHLORIDE 103   CO2 25   BUN 16   CREATININE 0.78   GLUCOSE 117*   CALCIUM 8.7     Recent Labs   Lab 09/29/22  0352   PT 11.4   INR 1.1        Lab Results   Component Value Date    WBC 9.0 09/29/2022    WBC 5.9 12/19/2013    HGB 14.5 09/29/2022    HGB 14.4 12/19/2013     09/29/2022     12/19/2013    SODIUM 135 09/29/2022    SODIUM 139 12/19/2013    POTASSIUM 3.8 09/29/2022    POTASSIUM 4.1 12/19/2013    BUN 16 09/29/2022    BUN 14 12/19/2013    CREATININE 0.78 09/29/2022    CREATININE 0.94 12/19/2013    AST 34 09/29/2022    AST 14 12/19/2013    GPT 76 (H) 09/29/2022    GPT 29 12/19/2013    ALKPT 87 09/29/2022    ALKPT 82 12/19/2013    HGBA1C 5.2 01/29/2021    HGBA1C 5.1 12/19/2013    MG 2.2 02/01/2021    PHOS 4.3 02/01/2021    CALCLDL 30 08/12/2022    CALCLDL 121 12/19/2013    HDL 46 08/12/2022    HDL 40 12/19/2013    TRIGLYCERIDE 72 08/12/2022    TRIGLYCERIDE 168 (H) 12/19/2013        Assessment/Plan: Present on admission    Acute on chronic back pain-worsening  -patient has had chronic back pain including a microdiscectomy in 2017 L4-L5  - Patient is afebrile with no signs of infectious process; he has no focal weakness, paresthesias/saddle anesthesia/incontinence or urinary retention  - Patient was recently seen on 9/19 by his orthopedic spine office and they recommended MRI back to rule out any other etiologies including malignancy versus compression fracture as pain has worsened lately needing more medications including gabapentin on top of Valium  - We will order MRI thoracic/lumbar to rule out etiologies above with persistent refractory back pain to gabapentin/Valium  - Patient otherwise has x-rays reflecting degenerative disease  - We will order physical therapy and added Toradol for pain and patient has no contraindications to this medication  - Patient otherwise on scheduled Tylenol at home as well as gabapentin  - We will add  a lidocaine patch as well  - We will leave tramadol as needed for severe pain low-dose  ----if no fracture will do PO steroids medrol dose pack  -Patient has no contributions to the above medications and he sees pain management for intermittent lumbar radiofrequency nerve ablation and his most recent one is 9/19 and they recommended repeat imaging  - Encourage ambulation    Chest pain  - Likely referred secondary from back pain and no acute ischemic EKG changes  - Troponin slightly elevated and cardiology ordering echocardiogram    Hypertrophic cardiomyopathy  - Cardiology recommendations    Elevated troponin-New  - Less likely due to ACS and likely elevated chronically due to cardiomyopathy hypertrophic  - Echocardiogram per cardiology    COVID  --Vaccinated x3; asymptomatic; monitor; no indication for treatment    CAD  - Status post STEMI in 2021  - Currently on aspirin/beta-blocker    Hyperlipidemia  - Continue home medications    Anxiety  - Continue medications    Hypertension  - Continue metoprolol    History of CVA  - No deficits and follow-up as an outpatient      DVT Prophylaxis  Lovenox    -ECG personally reviewed sinus  -CXR personally reviewed clear lungs b/l  -Old records reviewed, careeverywhere ortho-spine note 9/19  -I ordered daily BMP/CBC for tomorrow      Code Status    Code Status: Full Resuscitation  Verified with pt  I discussed with patient, nurse     Primary Care Physician   I discussed the plan with Liana Napier via EPIC    Based on the patient's presentation on admission, I expect the patient to require at least 2 midnights of medically necessary Hospital services    Skyler Barrera MD  [facility]  Can be reached via [employer]      77 MINS WERE SPENT ON THIS PATIENTS CARE TODAY, more than 50% of which was spent coordinating patient care. This includes endering the following: Reviewed all vitals, medications, new orders, I/O, labs, micro, radiology, nurses notes, pertinent  consultant notes which are reflected in assessment and plan.This does not include time spent on other items of care such as smoking cessation counseling, prolonged care time, and or advanced care planning if applicable.          to return home

## 2022-10-03 ENCOUNTER — APPOINTMENT (OUTPATIENT)
Dept: RADIOLOGY | Facility: CLINIC | Age: 82
End: 2022-10-03

## 2022-10-03 PROCEDURE — 71046 X-RAY EXAM CHEST 2 VIEWS: CPT

## 2022-11-01 ENCOUNTER — APPOINTMENT (OUTPATIENT)
Dept: MRI IMAGING | Facility: CLINIC | Age: 82
End: 2022-11-01

## 2022-11-01 PROCEDURE — 73718 MRI LOWER EXTREMITY W/O DYE: CPT | Mod: RT,MH

## 2022-11-07 ENCOUNTER — APPOINTMENT (OUTPATIENT)
Dept: CARDIOLOGY | Facility: CLINIC | Age: 82
End: 2022-11-07

## 2022-11-07 ENCOUNTER — NON-APPOINTMENT (OUTPATIENT)
Age: 82
End: 2022-11-07

## 2022-11-07 VITALS
DIASTOLIC BLOOD PRESSURE: 70 MMHG | WEIGHT: 122 LBS | BODY MASS INDEX: 20.83 KG/M2 | HEIGHT: 64 IN | HEART RATE: 78 BPM | SYSTOLIC BLOOD PRESSURE: 125 MMHG | OXYGEN SATURATION: 96 %

## 2022-11-07 PROCEDURE — 99214 OFFICE O/P EST MOD 30 MIN: CPT

## 2022-11-07 PROCEDURE — 93000 ELECTROCARDIOGRAM COMPLETE: CPT

## 2022-11-07 NOTE — HISTORY OF PRESENT ILLNESS
[FreeTextEntry1] : Mrs. Suzan Watkins is a 82-year-old woman with ongoing management of coronary artery disease with coronary stent placement several years ago and recurrence of chest pain and pain across her back with exertion and demonstrated severe in-stent restenosis of the right postero-lateral branch and managed with placement of drug eluting stent. To evaluate another recurrence of symptoms referred for repeat coronary angiography and there was no re-stenosis or other occlusive disease. She subsequently began pulmonary evaluation and therapy and lost significant amount of weight with marked improvement and she continues walking for exercise. She has no palpitations. 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 Shortly after last visit few weeks ago began experiencing exertional angina, "like an elephant sitting on my chest" walking short distance and especially climbing flight of stairs. Presented to [facility] and underwent another intervention on re-stenosis in vicinity of posterolateral artery. Has no angina since and no dyspnea with routine exertion.\par \par Presented with "7 blackouts" described as beginning 5/1/2018, stereotyped episodes of sudden severe occipital pain - "like somewhat hit me with a shovel in the back of my head" followed by total blindness lasting ~30 seconds and followed by ataxia. 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 Subsequent further symptoms, then subsided. Had returned to walking regularly. However again having episodes of severe occipital headache with vertigo and even when not acutely symptomatic balance is impaired. Episodes are fairly consistently provoked by leaning head back against a surface and putting pressure on back of head.\par \par Had multiple procedures on neck with some response, but episodes still provoked, especially by lying head fully back, such as during dental cleanings, CT scans if lies flat. Now effectively avoiding the provocations. Also has had serial colonoscopies and multiple polyps resected.\par \par Headaches controlled with Botox injections, having balance issues. However recently walking regularly, building back to former levels, at this time several blocks around community without chest pain or dyspnea. Had multi-level procedure on her back, vertebral fusions and went well, symptoms improved, though will remain with some back pain and has sciatic pain. However, all generally better controlled at this time, walking few blocks. Has no chest pain, no dyspnea, no palpitations and less issues with dizziness, vertigo.\par \par

## 2022-11-07 NOTE — REVIEW OF SYSTEMS
[Joint Pain] : joint pain [Ankle Problem] : ankle problems [Dizziness] : dizziness [Numbness (Hypoesthesia)] : numbness [Tingling (Paresthesia)] : tingling [Negative] : Heme/Lymph [Blurry Vision] : blurred vision [Drooping Eyelid] : drooping eyelid [FreeTextEntry3] : visual disturbance due to bilateral ptosis, worse on left

## 2022-11-07 NOTE — PHYSICAL EXAM
[Not Palpable] : not palpable [Normal Rate] : normal [Rhythm Regular] : regular [Normal S1] : normal S1 [Normal S2] : normal S2 [II] : a grade 2 [___ +] : [unfilled]U+ pitting edema to L ankle [2+] : left 2+ [0] : right 0 [1+] : left 1+ [Normal Gait] : normal gait [Normal] : alert and oriented, normal memory [Ptosis Right] : ptosis of the right eye was noted [Ptosis Left] : ptosis of the left eye was noted [Click] : no click [Right Carotid Bruit] : no bruit heard over the right carotid [Left Carotid Bruit] : no bruit heard over the left carotid [de-identified] : bilateral cervical rib resection

## 2022-11-07 NOTE — CARDIOLOGY SUMMARY
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 [de-identified] : 6/12/2018 - EF 70-75%, calcified aortic valve, normal opening. 1. Endocardium not well visualized; grossly normal left ventricular systolic function. 2. Reversal of the E-A  waves of the mitral inflow pattern is consistent with diastolic LV dysfunction. 3. The right ventricle is not well visualized; grossly normal right ventricular systolic function. 4. No pericardial effusion seen.  [de-identified] : 11/13/2014 patent RPL stent, 100% Cx-OM well collateralized, moderate mid-LAD lesion\par 11/8/2017, 7/29/2013 RPL ALEJANDRA

## 2022-11-07 NOTE — DISCUSSION/SUMMARY
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

## 2022-11-09 ENCOUNTER — APPOINTMENT (OUTPATIENT)
Dept: PLASTIC SURGERY | Facility: CLINIC | Age: 82
End: 2022-11-09

## 2022-11-09 VITALS
DIASTOLIC BLOOD PRESSURE: 70 MMHG | TEMPERATURE: 97.7 F | HEIGHT: 64 IN | BODY MASS INDEX: 21.51 KG/M2 | OXYGEN SATURATION: 99 % | SYSTOLIC BLOOD PRESSURE: 115 MMHG | RESPIRATION RATE: 17 BRPM | WEIGHT: 126 LBS | HEART RATE: 62 BPM

## 2022-11-09 DIAGNOSIS — Z83.3 FAMILY HISTORY OF DIABETES MELLITUS: ICD-10-CM

## 2022-11-09 DIAGNOSIS — L90.5 SCAR CONDITIONS AND FIBROSIS OF SKIN: ICD-10-CM

## 2022-11-09 PROCEDURE — 99205 OFFICE O/P NEW HI 60 MIN: CPT

## 2022-11-09 RX ORDER — MECLIZINE HYDROCHLORIDE 12.5 MG/1
12.5 TABLET ORAL
Qty: 90 | Refills: 0 | Status: DISCONTINUED | COMMUNITY
Start: 2022-06-02 | End: 2022-11-09

## 2022-11-09 RX ORDER — SODIUM PICOSULFATE, MAGNESIUM OXIDE, AND ANHYDROUS CITRIC ACID 10; 3.5; 12 MG/160ML; G/160ML; G/160ML
10-3.5-12 MG-GM LIQUID ORAL
Qty: 1 | Refills: 0 | Status: DISCONTINUED | COMMUNITY
Start: 2022-01-03 | End: 2022-11-09

## 2022-11-11 PROBLEM — L90.5 SKIN SCARRING/FIBROSIS: Status: ACTIVE | Noted: 2022-11-11

## 2022-11-11 PROBLEM — Z83.3 FAMILY HISTORY OF DIABETES MELLITUS: Status: ACTIVE | Noted: 2022-11-09

## 2022-12-03 NOTE — HISTORY OF PRESENT ILLNESS
[FreeTextEntry1] : Pt reports the her left upper lip is bothering her after a Mohs procedure and plastic surgery closure in March.\par She reports that the part of the scar that is on the lip itself has a firm mass to it and she feels it whenever she is eating and it makes it hard to apply lipstick. The upper part of the scar near her nose does not bother her.\par The timeline is as follows:\par 2/15/22 biopsy by her dermatologist - [facility]\par 3/23/22 mohs surgery by Dr. Bowen and repair by Dr. ePrsaud. Told sutures would dissolve in 5 weeks\par 4/13/22 Pt reports the stitches did not dissolve and they had to be cut out.\par \par She states that she will not go back to Dr. Persaud as she had a poor experience with him on the day of the repair.\par (See pt notes in pt hx and intake forms.)\par \par

## 2022-12-03 NOTE — ASSESSMENT
[FreeTextEntry1] : Left upper lip scar mass.\par I reviewed scar maturation as follows:\par \par Scars tend to heal fairly quickly (in 4-14 days depending on the site), then mature slowly over time. The vast majority of improvement occurs in the first 2-3 months, with the pink/red scar becoming softer, more pliable and lighter in color (depending upon skin tone). At 6 months, the scar appears lighter and at approximately one year, the scar reaches maximum improvement (with a range from 9-18 months).\par Scar care should include skin moisturizer, sunscreen, and if desired, silicone gel. \par Silicone gel use should begin after all scabs and crust are gone and the scars are smooth and dry, usually about 3 weeks. The silicone gel should be used twice a day for a treatment course of three months to achieve benefit. Sunscreen and/or makeup can be applied after the silicone gel has been applied and allowed to dry.\par If necessary, scar revision can be performed after 6 months (even better to wait 9-12 months). This can significantly improve some scars, but requires going through the healing and maturing process again from the beginning. \par In total, I reviewed four treatment options:\par Dermabrasion\par Laser resurfacing \par Re-excision in 9-12 months from the procedure (done in March 2022)\par "Tincture of time" along with silicone gel as outlined above.\par \par I provided her with the name of a dermatologist who offers laser scar treatment and dermabrasion in case she would like to pursue a consultation. Otherwise, I have asked her to return for re-evaluation in 3 months.\par She was very appreciative of the recommendations.

## 2022-12-03 NOTE — PHYSICAL EXAM
[NI] : Normal [de-identified] : Right medial subconjunctival hematoma. [de-identified] : Left upper lip scar:\par Upper portion in the lip skin is very fine, pale and pliable.\par Lower portion has a 5-6 mm scar mass extending across the white roll and slightly onto the vermilion.\par It is not tender and it is pale, but it is firm.\par  [de-identified] : NL respiratory effort noted

## 2022-12-03 NOTE — REASON FOR VISIT
[Consultation] : a consultation visit [FreeTextEntry1] : Patient states she had MOHS procedure done in 3/2022 and since then her scar is still noticeable.

## 2022-12-03 NOTE — ADDENDUM
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 She stated that the ribs were "embedded in the neck arteries".\par \par Also of note, pt's pain management physician is Dr. Mina Loomis in [place].

## 2022-12-03 NOTE — REVIEW OF SYSTEMS
[As Noted in HPI] : as noted in HPI [Negative] : Respiratory [FreeTextEntry3] : New "bruise" in right eye.

## 2023-01-05 ENCOUNTER — APPOINTMENT (OUTPATIENT)
Dept: OPHTHALMOLOGY | Facility: CLINIC | Age: 83
End: 2023-01-05
Payer: MEDICARE

## 2023-01-05 ENCOUNTER — NON-APPOINTMENT (OUTPATIENT)
Age: 83
End: 2023-01-05

## 2023-01-05 PROCEDURE — 92134 CPTRZ OPH DX IMG PST SGM RTA: CPT

## 2023-01-05 PROCEDURE — 99214 OFFICE O/P EST MOD 30 MIN: CPT

## 2023-01-20 ENCOUNTER — OFFICE (OUTPATIENT)
Dept: URBAN - METROPOLITAN AREA CLINIC 38 | Facility: CLINIC | Age: 83
Setting detail: OPHTHALMOLOGY
End: 2023-01-20
Payer: MEDICARE

## 2023-01-20 DIAGNOSIS — H02.831: ICD-10-CM

## 2023-01-20 DIAGNOSIS — H02.834: ICD-10-CM

## 2023-01-20 DIAGNOSIS — H53.40: ICD-10-CM

## 2023-01-20 DIAGNOSIS — H57.813: ICD-10-CM

## 2023-01-20 PROCEDURE — 99214 OFFICE O/P EST MOD 30 MIN: CPT | Performed by: OPHTHALMOLOGY

## 2023-01-20 PROCEDURE — 92285 EXTERNAL OCULAR PHOTOGRAPHY: CPT | Performed by: OPHTHALMOLOGY

## 2023-01-20 PROCEDURE — 92082 INTERMEDIATE VISUAL FIELD XM: CPT | Performed by: OPHTHALMOLOGY

## 2023-01-20 ASSESSMENT — CONFRONTATIONAL VISUAL FIELD TEST (CVF)
OS_FINDINGS: FULL
OD_FINDINGS: FULL

## 2023-01-20 ASSESSMENT — LID POSITION - DERMATOCHALASIS
OD_DERMATOCHALASIS: RUL 2+
OS_DERMATOCHALASIS: LUL 2+

## 2023-01-21 ASSESSMENT — VISUAL ACUITY
OD_BCVA: 20/50+3
OS_BCVA: 20/40-2

## 2023-02-08 ENCOUNTER — APPOINTMENT (OUTPATIENT)
Dept: PLASTIC SURGERY | Facility: CLINIC | Age: 83
End: 2023-02-08

## 2023-02-10 ENCOUNTER — OFFICE (OUTPATIENT)
Dept: URBAN - METROPOLITAN AREA CLINIC 38 | Facility: CLINIC | Age: 83
Setting detail: OPHTHALMOLOGY
End: 2023-02-10
Payer: MEDICARE

## 2023-02-10 ENCOUNTER — RX ONLY (RX ONLY)
Age: 83
End: 2023-02-10

## 2023-02-10 DIAGNOSIS — H57.813: ICD-10-CM

## 2023-02-10 DIAGNOSIS — H02.834: ICD-10-CM

## 2023-02-10 DIAGNOSIS — H02.831: ICD-10-CM

## 2023-02-10 PROCEDURE — 99213 OFFICE O/P EST LOW 20 MIN: CPT | Performed by: OPHTHALMOLOGY

## 2023-02-10 ASSESSMENT — LID POSITION - DERMATOCHALASIS
OS_DERMATOCHALASIS: LUL 2+
OD_DERMATOCHALASIS: RUL 2+

## 2023-02-10 ASSESSMENT — VISUAL ACUITY
OD_BCVA: 20/30-1
OS_BCVA: 20/30-1

## 2023-02-10 ASSESSMENT — CONFRONTATIONAL VISUAL FIELD TEST (CVF)
OS_FINDINGS: FULL
OD_FINDINGS: FULL

## 2023-02-27 ENCOUNTER — NON-APPOINTMENT (OUTPATIENT)
Age: 83
End: 2023-02-27

## 2023-02-27 ENCOUNTER — APPOINTMENT (OUTPATIENT)
Dept: CARDIOLOGY | Facility: CLINIC | Age: 83
End: 2023-02-27
Payer: MEDICARE

## 2023-02-27 VITALS
OXYGEN SATURATION: 99 % | HEART RATE: 56 BPM | HEIGHT: 64 IN | DIASTOLIC BLOOD PRESSURE: 78 MMHG | RESPIRATION RATE: 17 BRPM | BODY MASS INDEX: 20.83 KG/M2 | SYSTOLIC BLOOD PRESSURE: 134 MMHG | WEIGHT: 122 LBS

## 2023-02-27 DIAGNOSIS — E11.9 TYPE 2 DIABETES MELLITUS W/OUT COMPLICATIONS: ICD-10-CM

## 2023-02-27 PROCEDURE — 93000 ELECTROCARDIOGRAM COMPLETE: CPT

## 2023-02-27 PROCEDURE — 99214 OFFICE O/P EST MOD 30 MIN: CPT

## 2023-02-27 RX ORDER — METFORMIN HYDROCHLORIDE 500 MG/1
500 TABLET, COATED ORAL TWICE DAILY
Qty: 180 | Refills: 0 | Status: ACTIVE | COMMUNITY
Start: 2023-02-27

## 2023-02-27 NOTE — PHYSICAL EXAM
[Ptosis Right] : ptosis of the right eye was noted [Ptosis Left] : ptosis of the left eye was noted [Not Palpable] : not palpable [Normal Rate] : normal [Rhythm Regular] : regular [Normal S1] : normal S1 [Normal S2] : normal S2 [II] : a grade 2 [___ +] : [unfilled]U+ pitting edema to L ankle [2+] : left 2+ [0] : right 0 [1+] : left 1+ [Normal Gait] : normal gait [Normal] : alert and oriented, normal memory [Click] : no click [Right Carotid Bruit] : no bruit heard over the right carotid [Left Carotid Bruit] : no bruit heard over the left carotid [de-identified] : bilateral cervical rib resection

## 2023-02-27 NOTE — HISTORY OF PRESENT ILLNESS
[FreeTextEntry1] : Mrs. Suzan Watkins is a 82-year-old woman with ongoing management of coronary artery disease with coronary stent placement several years ago and recurrence of chest pain and pain across her back with exertion and demonstrated severe in-stent restenosis of the right postero-lateral branch and managed with placement of drug eluting stent. To evaluate another recurrence of symptoms referred for repeat coronary angiography and there was no re-stenosis or other occlusive disease. She subsequently began pulmonary evaluation and therapy and lost significant amount of weight with marked improvement and she continues walking for exercise. She has no palpitations. 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 Shortly after last visit few weeks ago began experiencing exertional angina, "like an elephant sitting on my chest" walking short distance and especially climbing flight of stairs. Presented to [facility] and underwent another intervention on re-stenosis in vicinity of posterolateral artery. Has no angina since and no dyspnea with routine exertion.\par \par Presented with "7 blackouts" described as beginning 5/1/2018, stereotyped episodes of sudden severe occipital pain - "like somewhat hit me with a shovel in the back of my head" followed by total blindness lasting ~30 seconds and followed by ataxia. 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 Subsequent further symptoms, then subsided. Had returned to walking regularly. However again having episodes of severe occipital headache with vertigo and even when not acutely symptomatic balance is impaired. Episodes are fairly consistently provoked by leaning head back against a surface and putting pressure on back of head.\par \par Had multiple procedures on neck with some response, but episodes still provoked, especially by lying head fully back, such as during dental cleanings, CT scans if lies flat. Now effectively avoiding the provocations. Also has had serial colonoscopies and multiple polyps resected.\par \par Headaches controlled with Botox injections, having balance issues. However recently walking regularly, building back to former levels, at this time several blocks around community without chest pain or dyspnea. Had multi-level procedure on her back, vertebral fusions and went well, symptoms improved, though will remain with some back pain and has sciatic pain. However, all generally better controlled at this time, walking few blocks. Has no chest pain, no dyspnea, no palpitations and less issues with dizziness, vertigo.\par \par

## 2023-02-27 NOTE — REVIEW OF SYSTEMS
[Blurry Vision] : blurred vision [Drooping Eyelid] : drooping eyelid [Joint Pain] : joint pain [Ankle Problem] : ankle problems [Dizziness] : dizziness [Numbness (Hypoesthesia)] : numbness [Tingling (Paresthesia)] : tingling [Negative] : Heme/Lymph [FreeTextEntry3] : visual disturbance due to bilateral ptosis, worse on left

## 2023-02-27 NOTE — DISCUSSION/SUMMARY
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 Has vertebral artery stent. [FreeTextEntry1] : 82 year old woman who has stable ischemic heart disease, controlled hypertension, has never manifested congestive heart failure and rhythm is stable. Functional capacity is satisfactory and thus there is no contraindication to Occuloplastic surgical procedure with sedation/analgesia. It is reasonable to remain off clopidogrel for 5 days prior to procedure and resume when hemostasis deemed adequate, but she must remain on uninterrupted low dose aspirin. She should remain on all other cardiac medications. \par

## 2023-02-27 NOTE — CARDIOLOGY SUMMARY
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 [de-identified] : 6/12/2018 - EF 70-75%, calcified aortic valve, normal opening. 1. Endocardium not well visualized; grossly normal left ventricular systolic function. 2. Reversal of the E-A  waves of the mitral inflow pattern is consistent with diastolic LV dysfunction. 3. The right ventricle is not well visualized; grossly normal right ventricular systolic function. 4. No pericardial effusion seen.  [de-identified] : 11/13/2014 patent RPL stent, 100% Cx-OM well collateralized, moderate mid-LAD lesion\par 11/8/2017, 7/29/2013 RPL ALEJANDRA

## 2023-03-03 ENCOUNTER — AMBULATORY SURGERY CENTER (OUTPATIENT)
Dept: URBAN - METROPOLITAN AREA SURGERY 4 | Facility: SURGERY | Age: 83
Setting detail: OPHTHALMOLOGY
End: 2023-03-03
Payer: MEDICARE

## 2023-03-03 DIAGNOSIS — H57.813: ICD-10-CM

## 2023-03-03 DIAGNOSIS — H02.831: ICD-10-CM

## 2023-03-03 DIAGNOSIS — H02.834: ICD-10-CM

## 2023-03-03 PROCEDURE — 67900 REPAIR BROW DEFECT: CPT | Performed by: OPHTHALMOLOGY

## 2023-03-03 PROCEDURE — 15823 BLEPHARP UPR EYELID XCSV SKN: CPT | Performed by: OPHTHALMOLOGY

## 2023-03-10 ENCOUNTER — RX ONLY (RX ONLY)
Age: 83
End: 2023-03-10

## 2023-03-10 ENCOUNTER — OFFICE (OUTPATIENT)
Dept: URBAN - METROPOLITAN AREA CLINIC 38 | Facility: CLINIC | Age: 83
Setting detail: OPHTHALMOLOGY
End: 2023-03-10
Payer: MEDICARE

## 2023-03-10 DIAGNOSIS — H02.834: ICD-10-CM

## 2023-03-10 DIAGNOSIS — H02.831: ICD-10-CM

## 2023-03-10 DIAGNOSIS — H57.813: ICD-10-CM

## 2023-03-10 PROCEDURE — 99024 POSTOP FOLLOW-UP VISIT: CPT | Performed by: OPHTHALMOLOGY

## 2023-03-10 ASSESSMENT — LID POSITION - DERMATOCHALASIS
OD_DERMATOCHALASIS: ABSENT
OS_DERMATOCHALASIS: ABSENT

## 2023-03-10 ASSESSMENT — LID POSITION - COMMENTS
OD_COMMENTS: WOUND C/D/I, HEALING WELL.GOOD HEIGHT AND GOOD SYMMETRY.
OS_COMMENTS: WOUND C/D/I, HEALING WELL.GOOD HEIGHT AND GOOD SYMMETRY.

## 2023-03-10 ASSESSMENT — CONFRONTATIONAL VISUAL FIELD TEST (CVF)
OS_FINDINGS: FULL
OD_FINDINGS: FULL

## 2023-03-11 ASSESSMENT — VISUAL ACUITY
OD_BCVA: 20/30
OS_BCVA: 20/30

## 2023-05-08 ENCOUNTER — APPOINTMENT (OUTPATIENT)
Dept: CARDIOLOGY | Facility: CLINIC | Age: 83
End: 2023-05-08
Payer: MEDICARE

## 2023-05-08 ENCOUNTER — NON-APPOINTMENT (OUTPATIENT)
Age: 83
End: 2023-05-08

## 2023-05-08 VITALS
SYSTOLIC BLOOD PRESSURE: 128 MMHG | OXYGEN SATURATION: 100 % | BODY MASS INDEX: 21.34 KG/M2 | DIASTOLIC BLOOD PRESSURE: 68 MMHG | HEIGHT: 64 IN | HEART RATE: 63 BPM | WEIGHT: 125 LBS

## 2023-05-08 PROCEDURE — 93000 ELECTROCARDIOGRAM COMPLETE: CPT

## 2023-05-08 PROCEDURE — 99214 OFFICE O/P EST MOD 30 MIN: CPT

## 2023-05-08 NOTE — CARDIOLOGY SUMMARY
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 [de-identified] : 6/12/2018 - EF 70-75%, calcified aortic valve, normal opening. 1. Endocardium not well visualized; grossly normal left ventricular systolic function. 2. Reversal of the E-A  waves of the mitral inflow pattern is consistent with diastolic LV dysfunction. 3. The right ventricle is not well visualized; grossly normal right ventricular systolic function. 4. No pericardial effusion seen.  [de-identified] : 11/13/2014 patent RPL stent, 100% Cx-OM well collateralized, moderate mid-LAD lesion\par 11/8/2017, 7/29/2013 RPL ALEJANDRA

## 2023-05-08 NOTE — PHYSICAL EXAM
[Ptosis Right] : ptosis of the right eye was noted [Ptosis Left] : ptosis of the left eye was noted [Not Palpable] : not palpable [Normal Rate] : normal [Rhythm Regular] : regular [Normal S1] : normal S1 [Normal S2] : normal S2 [II] : a grade 2 [2+] : left 2+ [0] : right 0 [1+] : left 1+ [Normal Gait] : normal gait [Normal] : alert and oriented, normal memory [Carotids] : the murmur was transmitted to the carotid arteries [___ +] : bilateral [unfilled]U+ pitting edema to the ankles [Click] : no click [Right Carotid Bruit] : no bruit heard over the right carotid [Left Carotid Bruit] : no bruit heard over the left carotid [de-identified] : ptosis repaired [de-identified] : right subclavian bruit [de-identified] : bilateral cervical rib resection

## 2023-05-08 NOTE — DISCUSSION/SUMMARY
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 Has vertebral artery stent. [FreeTextEntry1] : 82 year old woman who has stable ischemic heart disease, controlled hypertension, has never manifested congestive heart failure and rhythm is stable. Functional capacity is satisfactory and thus there is no contraindication to Orthopedic hand surgery with sedation/analgesia. It is reasonable to remain off clopidogrel for 5 days prior to procedure and resume when hemostasis deemed adequate, but she must remain on uninterrupted low dose aspirin. She should remain on all other cardiac medications. \par

## 2023-05-08 NOTE — HISTORY OF PRESENT ILLNESS
[FreeTextEntry1] : Mrs. Suzan Watkins is a 82-year-old woman with ongoing management of coronary artery disease with coronary stent placement several years ago and recurrence of chest pain and pain across her back with exertion and demonstrated severe in-stent restenosis of the right postero-lateral branch and managed with placement of drug eluting stent. To evaluate another recurrence of symptoms referred for repeat coronary angiography and there was no re-stenosis or other occlusive disease. She subsequently began pulmonary evaluation and therapy and lost significant amount of weight with marked improvement and she continues walking for exercise. She has no palpitations. 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 Shortly after last visit few weeks ago began experiencing exertional angina, "like an elephant sitting on my chest" walking short distance and especially climbing flight of stairs. Presented to [facility] and underwent another intervention on re-stenosis in vicinity of posterolateral artery. Has no angina since and no dyspnea with routine exertion.\par \par Presented with "7 blackouts" described as beginning 5/1/2018, stereotyped episodes of sudden severe occipital pain - "like somewhat hit me with a shovel in the back of my head" followed by total blindness lasting ~30 seconds and followed by ataxia. 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 Subsequent further symptoms, then subsided. Had returned to walking regularly. However again having episodes of severe occipital headache with vertigo and even when not acutely symptomatic balance is impaired. Episodes are fairly consistently provoked by leaning head back against a surface and putting pressure on back of head.\par \par Had multiple procedures on neck with some response, but episodes still provoked, especially by lying head fully back, such as during dental cleanings, CT scans if lies flat. Now effectively avoiding the provocations. Also has had serial colonoscopies and multiple polyps resected.\par \par Headaches controlled with Botox injections, having balance issues. However recently walking regularly, building back to former levels, at this time several blocks around community without chest pain or dyspnea. Had multi-level procedure on her back, vertebral fusions and went well, symptoms improved, though will remain with some back pain and has sciatic pain. However, all generally better controlled at this time, walking few blocks. Has no chest pain, no dyspnea, no palpitations and less issues with dizziness, vertigo.\par \par Eyelid surgery went well, now can see. Remains active, denies chest pain, dyspnea, orthopnea. Planning multiple right trigger finger, endoscopic carpal tunnel release. \par \par

## 2023-05-08 NOTE — ASSESSMENT
[FreeTextEntry1] : 82 year old woman who has stable ischemic heart disease, controlled hypertension, has never manifested congestive heart failure and rhythm is stable. Functional capacity is satisfactory and thus there is no contraindication to Orthopedic hand surgery with sedation/analgesia. It is reasonable to remain off clopidogrel for 5 days prior to procedure and resume when hemostasis deemed adequate, but she must remain on uninterrupted low dose aspirin. She should remain on all other cardiac medications. \par

## 2023-05-12 ENCOUNTER — TRANSCRIPTION ENCOUNTER (OUTPATIENT)
Age: 83
End: 2023-05-12

## 2023-05-19 ENCOUNTER — OFFICE (OUTPATIENT)
Dept: URBAN - METROPOLITAN AREA CLINIC 38 | Facility: CLINIC | Age: 83
Setting detail: OPHTHALMOLOGY
End: 2023-05-19
Payer: MEDICARE

## 2023-05-19 DIAGNOSIS — H02.834: ICD-10-CM

## 2023-05-19 DIAGNOSIS — H02.831: ICD-10-CM

## 2023-05-19 DIAGNOSIS — H57.813: ICD-10-CM

## 2023-05-19 PROCEDURE — 99024 POSTOP FOLLOW-UP VISIT: CPT | Performed by: OPHTHALMOLOGY

## 2023-05-19 ASSESSMENT — CONFRONTATIONAL VISUAL FIELD TEST (CVF)
OD_FINDINGS: FULL
OS_FINDINGS: FULL

## 2023-05-19 ASSESSMENT — LID POSITION - COMMENTS
OS_COMMENTS: GOOD HEIGHT AND GOOD SYMMETRY.
OD_COMMENTS: GOOD HEIGHT AND GOOD SYMMETRY.

## 2023-05-19 ASSESSMENT — LID POSITION - DERMATOCHALASIS
OD_DERMATOCHALASIS: ABSENT
OS_DERMATOCHALASIS: ABSENT

## 2023-05-19 ASSESSMENT — VISUAL ACUITY
OS_BCVA: 20/30-1
OD_BCVA: 20/30

## 2023-05-19 NOTE — PATIENT PROFILE ADULT. - ABILITY TO HEAR (WITH HEARING AID OR HEARING APPLIANCE IF NORMALLY USED):
Per chart review, patient has decided to not pursue rituximab at this time because she's currently stable.     Shadia Lew, PharmD, AA[facility]  Medication Therapy Management Pharmacist        wear hearing aids/Mildly to Moderately Impaired: difficulty hearing in some environments or speaker may need to increase volume or speak distinctly

## 2023-06-08 ENCOUNTER — APPOINTMENT (OUTPATIENT)
Dept: CT IMAGING | Facility: CLINIC | Age: 83
End: 2023-06-08
Payer: MEDICARE

## 2023-06-08 PROCEDURE — 71250 CT THORAX DX C-: CPT | Mod: MH

## 2023-07-06 ENCOUNTER — APPOINTMENT (OUTPATIENT)
Dept: OPHTHALMOLOGY | Facility: CLINIC | Age: 83
End: 2023-07-06
Payer: MEDICARE

## 2023-07-06 ENCOUNTER — NON-APPOINTMENT (OUTPATIENT)
Age: 83
End: 2023-07-06

## 2023-07-06 PROCEDURE — 92134 CPTRZ OPH DX IMG PST SGM RTA: CPT

## 2023-07-06 PROCEDURE — 92014 COMPRE OPH EXAM EST PT 1/>: CPT

## 2023-07-12 NOTE — PROGRESS NOTE ADULT - PROBLEM SELECTOR PLAN 3
S/p bilateral stents and negative cerebral angio yesterday complicated by immediate onset constant debilitating vertigo concerning for artery injury/dissection  - f/u MRI/MRA w/wo contrast to assess patency  - f/u neurology recs
S/p bilateral stents and negative cerebral angio  - f/u outpatient neurology Dr. Ortiz
S/p bilateral stents  - f/u MRI/MRA NOVA to assess patency  - f/u neurosurgery, neurology recs
S/p bilateral stents  - f/u MRI/MRA [facility]OVA to assess patency  - f/u neurosurgery, neurology recs
full weight-bearing

## 2023-07-18 NOTE — PATIENT PROFILE ADULT. - BILL OF RIGHTS/ADMISSION INFORMATION PROVIDED TO:
Office Visit - General Surgery  Arpita Lemus MRN: [id number]  Encounter: [id number]    Assessment and Plan  Problem List Items Addressed This Visit    None      Chief Complaint:  Arpita Lemus is a 74 y.o. female who presents for Post-op (P/o )    Subjective      Past Medical History:   Diagnosis Date   • A-fib (HCC)    • Arthritis    • Atrial fibrillation (HCC)    • Cervical spondylosis with myelopathy    • Gastric bypass status for obesity    • History of transfusion     35 years ago   • Hypertension    • mass right kidney    • Renal cell adenocarcinoma (HCC)     RIGHT   • Sleep apnea        Past Surgical History:   Procedure Laterality Date   • ABDOMINAL ADHESION SURGERY N/A 6/19/2023    Procedure: LYSIS ADHESIONS;  Surgeon: Curtis Conley DO;  [facility];  Service: General   • APPENDECTOMY N/A 6/19/2023    Procedure: APPENDECTOMY;  Surgeon: Curtis Conley DO;  [facility];  Service: General   • CHOLECYSTECTOMY     • COLOSTOMY     • CRYOABLATION Right     RT KIDNEY   • CYSTORRHAPHY      Bladder. Last assessed 4/6/2016    • EXPLORATORY LAPAROTOMY W/ BOWEL RESECTION N/A 6/19/2023    Procedure: LAPAROTOMY EXPLORATORY W/ BOWEL RESECTION;  Surgeon: Gildardo Jackson MD;  [facility];  Service: General   • GASTRIC BYPASS     • HERNIA REPAIR      Last assessed 4/6/2016    • HYSTERECTOMY     • LAPAROTOMY N/A 6/19/2023    Procedure: LAPAROTOMY EXPLORATORY, washout, vac change;  Surgeon: Curtis Conley DO;  [facility];  Service: General   • LAPAROTOMY N/A 6/20/2023    Procedure: LAPAROTOMY, REMOVAL OF WOUND VAC AND PACKING, SMALL BOWEL RESECTION , BOWEL ANASTOMOSIS X 3 , VICRYL MESH BRIDGING -;  Surgeon: Curtis Conley DO;  [facility];  Service: General   • AK OPTX DSTL RADL X-ARTIC FX/EPIPHYSL SEP Right 4/22/2016    Procedure: OPEN REDUCTION INTERNAL FIXATION RIGHT DISTAL RADIUS;  Surgeon: Erich Ledezma MD;  [facility];  Service: Orthopedics   • REVISION COLOSTOMY     • SMALL INTESTINE SURGERY N/A 6/19/2023    Procedure: RESECTION SMALL BOWEL;  Surgeon: Curtis Conley DO;  [facility];  Service: General   • THYROID LOBECTOMY Left 8/27/2019    Procedure: LOBECTOMY THYROID, left;  Surgeon: Morgan Caba MD;  [facility];  Service: Surgical Oncology   • TUBAL LIGATION     • US GUIDED THYROID BIOPSY  2/27/2019   • US GUIDED THYROID BIOPSY  5/29/2019       Family History   Problem Relation Age of Onset   • Heart attack Mother    • Lung cancer Father 68   • Heart disease Father    • Stroke Sister    • Lung cancer Sister 56   • Prostate cancer Brother 63       Social History     Tobacco Use   • Smoking status: Never   • Smokeless tobacco: Never   Vaping Use   • Vaping Use: Never used   Substance Use Topics   • Alcohol use: Not Currently     Alcohol/week: 0.0 standard drinks of alcohol     Comment: 0   • Drug use: No        Medications  No current facility-administered medications on file prior to visit.     Current Outpatient Medications on File Prior to Visit   Medication Sig Dispense Refill   • acetaminophen (TYLENOL) 325 mg tablet Take 975 mg by mouth every 6 (six) hours as needed for mild pain or moderate pain     • amiodarone 200 mg tablet Take 1 tablet (200 mg total) by mouth daily with breakfast for 11 doses Do not start before July 8, 2023. 11 tablet 0   • apixaban (Eliquis) 5 mg Take 1 tablet (5 mg total) by mouth 2 (two) times a day 60 tablet 11   • diltiazem (CARDIZEM CD) 360 MG 24 hr capsule Take 1 capsule (360 mg total) by mouth daily 90 capsule 3   • ergocalciferol (VITAMIN D2) 50,000 units Take 50,000 Units by mouth once a week Takes D3      • FLUoxetine (PROzac) 20 mg capsule Take 20 mg by mouth 2 (two) times a day       • gabapentin (NEURONTIN) 300 mg capsule Take 600 mg by mouth daily at bedtime      • hydrochlorothiazide (HYDRODIURIL) 12.5 mg tablet Take 1 tablet (12.5 mg total) by  mouth daily 90 tablet 3   • loperamide (IMODIUM) 2 mg capsule Take 1 capsule (2 mg total) by mouth 2 (two) times a day (Patient taking differently: Take 2 mg by mouth as needed for diarrhea) 30 capsule 0   • LORazepam (ATIVAN) 1 mg tablet Take 1 mg by mouth daily as needed for anxiety     • melatonin 3 mg Take 2 tablets (6 mg total) by mouth daily at bedtime  0   • metoprolol succinate (TOPROL-XL) 25 mg 24 hr tablet Take 1 tablet (25 mg total) by mouth daily (Patient taking differently: Take 25 mg by mouth every 12 (twelve) hours) 90 tablet 3   • Probiotic Product (PRO-BIOTIC BLEND PO) Take by mouth     • psyllium (METAMUCIL SMOOTH TEXTURE) 28 % packet Take 1 packet by mouth 2 (two) times a day     • QUEtiapine (SEROquel) 25 mg tablet Take 0.5 tablets (12.5 mg total) by mouth daily at bedtime (Patient taking differently: Take 12.5 mg by mouth daily at bedtime as needed)  0   • acetaminophen (TYLENOL) 325 mg tablet Take 2 tablets (650 mg total) by mouth every 6 (six) hours as needed for mild pain or fever (Patient not taking: Reported on 7/18/2023)  0   • amiodarone 200 mg tablet Take 1 tablet (200 mg total) by mouth 3 (three) times a day with meals for 10 doses 10 tablet 0   • LORazepam (ATIVAN) 1 mg tablet Take 1 tablet (1 mg total) by mouth daily at bedtime for 7 days 7 tablet 0   • potassium chloride SA (KLOR-CON M15) 15 MEQ tablet Take 1 tablet (15 mEq total) by mouth daily for 7 days 7 tablet 0       Allergies  No Known Allergies    Review of Systems    Objective  Vitals:    07/18/23 1038   Temp: 97.8 °F (36.6 °C)       Physical Exam   Patient

## 2023-08-24 ENCOUNTER — RESULT REVIEW (OUTPATIENT)
Age: 83
End: 2023-08-24

## 2023-08-29 ENCOUNTER — RESULT REVIEW (OUTPATIENT)
Age: 83
End: 2023-08-29

## 2023-09-05 ENCOUNTER — RESULT REVIEW (OUTPATIENT)
Age: 83
End: 2023-09-05

## 2023-09-06 ENCOUNTER — APPOINTMENT (OUTPATIENT)
Dept: MAMMOGRAPHY | Facility: CLINIC | Age: 83
End: 2023-09-06
Payer: MEDICARE

## 2023-09-06 ENCOUNTER — APPOINTMENT (OUTPATIENT)
Dept: ULTRASOUND IMAGING | Facility: CLINIC | Age: 83
End: 2023-09-06
Payer: MEDICARE

## 2023-09-06 PROCEDURE — 77067 SCR MAMMO BI INCL CAD: CPT

## 2023-09-06 PROCEDURE — 77063 BREAST TOMOSYNTHESIS BI: CPT

## 2023-09-06 PROCEDURE — 76641 ULTRASOUND BREAST COMPLETE: CPT | Mod: 50,GY

## 2023-09-11 ENCOUNTER — APPOINTMENT (OUTPATIENT)
Dept: ULTRASOUND IMAGING | Facility: CLINIC | Age: 83
End: 2023-09-11
Payer: MEDICARE

## 2023-09-11 PROCEDURE — 76770 US EXAM ABDO BACK WALL COMP: CPT

## 2023-09-29 ENCOUNTER — RESULT REVIEW (OUTPATIENT)
Age: 83
End: 2023-09-29

## 2023-10-11 ENCOUNTER — NON-APPOINTMENT (OUTPATIENT)
Age: 83
End: 2023-10-11

## 2023-10-31 ENCOUNTER — APPOINTMENT (OUTPATIENT)
Dept: CT IMAGING | Facility: CLINIC | Age: 83
End: 2023-10-31
Payer: MEDICARE

## 2023-10-31 PROCEDURE — 73702 CT LWR EXTREMITY W/O&W/DYE: CPT | Mod: LT,MH

## 2023-11-06 ENCOUNTER — APPOINTMENT (OUTPATIENT)
Dept: CARDIOLOGY | Facility: CLINIC | Age: 83
End: 2023-11-06
Payer: MEDICARE

## 2023-11-06 ENCOUNTER — NON-APPOINTMENT (OUTPATIENT)
Age: 83
End: 2023-11-06

## 2023-11-06 VITALS
HEART RATE: 60 BPM | WEIGHT: 120 LBS | DIASTOLIC BLOOD PRESSURE: 70 MMHG | OXYGEN SATURATION: 98 % | SYSTOLIC BLOOD PRESSURE: 131 MMHG | BODY MASS INDEX: 20.6 KG/M2

## 2023-11-06 PROCEDURE — 99214 OFFICE O/P EST MOD 30 MIN: CPT

## 2023-11-06 PROCEDURE — 93000 ELECTROCARDIOGRAM COMPLETE: CPT

## 2023-11-06 PROCEDURE — 93306 TTE W/DOPPLER COMPLETE: CPT

## 2024-01-04 ENCOUNTER — NON-APPOINTMENT (OUTPATIENT)
Age: 84
End: 2024-01-04

## 2024-01-04 ENCOUNTER — APPOINTMENT (OUTPATIENT)
Dept: OPHTHALMOLOGY | Facility: CLINIC | Age: 84
End: 2024-01-04
Payer: MEDICARE

## 2024-01-04 PROCEDURE — 92134 CPTRZ OPH DX IMG PST SGM RTA: CPT

## 2024-01-04 PROCEDURE — 92014 COMPRE OPH EXAM EST PT 1/>: CPT

## 2024-01-10 NOTE — ED PROVIDER NOTE - PSH
Duplicate - prescription request refused.   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

## 2024-01-15 NOTE — ED PROVIDER NOTE - CPE EDP SKIN NORM
Patient 95% on 2L nc at rest. Patient 90% on RA at rest. SPO2 dropped to 85% on RA with ambulation. Patient rebounded to 94% on 2LNC  with ambulation.    normal...

## 2024-01-29 ENCOUNTER — NON-APPOINTMENT (OUTPATIENT)
Age: 84
End: 2024-01-29

## 2024-01-29 ENCOUNTER — APPOINTMENT (OUTPATIENT)
Dept: CARDIOLOGY | Facility: CLINIC | Age: 84
End: 2024-01-29
Payer: MEDICARE

## 2024-01-29 VITALS — HEART RATE: 60 BPM | DIASTOLIC BLOOD PRESSURE: 70 MMHG | SYSTOLIC BLOOD PRESSURE: 142 MMHG

## 2024-01-29 VITALS
HEART RATE: 63 BPM | OXYGEN SATURATION: 99 % | BODY MASS INDEX: 20.94 KG/M2 | DIASTOLIC BLOOD PRESSURE: 70 MMHG | SYSTOLIC BLOOD PRESSURE: 138 MMHG | WEIGHT: 122 LBS

## 2024-01-29 PROCEDURE — 93000 ELECTROCARDIOGRAM COMPLETE: CPT

## 2024-01-29 PROCEDURE — 99214 OFFICE O/P EST MOD 30 MIN: CPT

## 2024-01-29 PROCEDURE — G2211 COMPLEX E/M VISIT ADD ON: CPT

## 2024-01-29 NOTE — REVIEW OF SYSTEMS
[Drooping Eyelid] : drooping eyelid [Joint Pain] : joint pain [Ankle Problem] : ankle problems [Dizziness] : dizziness [Numbness (Hypoesthesia)] : numbness [Tingling (Paresthesia)] : tingling [Negative] : Heme/Lymph [FreeTextEntry9] : intermittent bleed right ankle, blisters develop, site of old fracture and repair with hardware

## 2024-01-29 NOTE — HISTORY OF PRESENT ILLNESS
[FreeTextEntry1] : Mrs. 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 To evaluate another recurrence of symptoms referred for repeat coronary angiography and there was no re-stenosis or other occlusive disease. She subsequently began pulmonary evaluation and therapy and lost significant amount of weight with marked improvement and she continues walking for exercise. She has no palpitations. 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 Shortly after last visit few weeks ago began experiencing exertional angina, "like an elephant sitting on my chest" walking short distance and especially climbing flight of stairs. Presented to [facility] and underwent another intervention on re-stenosis in vicinity of posterolateral artery. Has no angina since and no dyspnea with routine exertion.  Presented with "7 blackouts" described as beginning 5/1/2018, stereotyped episodes of sudden severe occipital pain - "like somewhat hit me with a shovel in the back of my head" followed by total blindness lasting ~30 seconds and followed by ataxia. 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 Subsequent further symptoms, then subsided. Had returned to walking regularly. However again having episodes of severe occipital headache with vertigo and even when not acutely symptomatic balance is impaired. Episodes are fairly consistently provoked by leaning head back against a surface and putting pressure on back of head.  Had multiple procedures on neck with some response, but episodes still provoked, especially by lying head fully back, such as during dental cleanings, CT scans if lies flat. Now effectively avoiding the provocations. Also has had serial colonoscopies and multiple polyps resected.  Headaches controlled with Botox injections, having balance issues. However recently walking regularly, building back to former levels, at this time several blocks around community without chest pain or dyspnea. Had multi-level procedure on her back, vertebral fusions and went well, symptoms improved, though will remain with some back pain and has sciatic pain. However, all generally better controlled at this time, walking few blocks. Has no chest pain, no dyspnea, no palpitations and less issues with dizziness, vertigo.  Eyelid surgery went well, now can see. Remains active, denies chest pain, dyspnea, orthopnea. Planning multiple right trigger finger, endoscopic carpal tunnel release.

## 2024-01-29 NOTE — PHYSICAL EXAM
[Ptosis Right] : ptosis of the right eye was noted [Ptosis Left] : ptosis of the left eye was noted [Not Palpable] : not palpable [Normal Rate] : normal [Rhythm Regular] : regular [Normal S1] : normal S1 [Normal S2] : normal S2 [II] : a grade 2 [Carotids] : the murmur was transmitted to the carotid arteries [No Pitting Edema] : no pitting edema present [2+] : left 2+ [0] : right 0 [1+] : left 1+ [Normal] : alert and oriented, normal memory [Abnormal Gait] : abnormal gait [Click] : no click [Right Carotid Bruit] : no bruit heard over the right carotid [Left Carotid Bruit] : no bruit heard over the left carotid [de-identified] : ptosis repaired [de-identified] : right subclavian bruit [de-identified] : bilateral cervical rib resection, pectus excavatum, muscle atrophy at meadial right ankle

## 2024-01-29 NOTE — CARDIOLOGY SUMMARY
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 11/8/2021 sinus rhythm, PA .21 1st degree heart block, old inferoapical MI, 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 3/15/2022 sinus rhythm, PA .21 1st degree heart block, old inferoapical MI, 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 5/14/2022 sinus rhythm, PA .21 1st degree heart block, old inferoapical MI, 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 11/7/2022 sinus rhythm, PA .21 1st degree heart block, old inferoapical MI, 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 2/27/2023 sinus rhythm, PA .21 1st degree heart block, old inferoapical MI, 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 5/8/2023 sinus rhythm, PA .21 1st degree heart block, right axis, old inferoapical MI, 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 11/6/2023 sinus rhythm, PA .21 1st degree heart block, right axis, old inferoapical MI, unchanged. 1/29/2024  [de-identified] : 11/6/2023 normal LV function, EF 50-55%, normal valves, poorly visualized right heart probably normal, trace TR. 6/12/2018 - EF 70-75%, calcified aortic valve, normal opening. 1. Endocardium not well visualized; grossly normal left ventricular systolic function. 2. Reversal of the E-A  waves of the mitral inflow pattern is consistent with diastolic LV dysfunction. 3. The right ventricle is not well visualized; grossly normal right ventricular systolic function. 4. No pericardial effusion seen.  [de-identified] : 11/13/2014 patent RPL stent, 100% Cx-OM well collateralized, moderate mid-LAD lesion\par  11/8/2017, 7/29/2013 RPL ALEJANDRA

## 2024-01-29 NOTE — DISCUSSION/SUMMARY
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 Functional capacity is satisfactory and thus there is no contraindication to GI endoscopic procedures with sedation/analgesia. It is reasonable to remain off clopidogrel for 5 days prior to procedure and resume when hemostasis deemed adequate, but she must remain on uninterrupted low dose aspirin. She should remain on all other cardiac medications.

## 2024-02-08 ENCOUNTER — RESULT REVIEW (OUTPATIENT)
Age: 84
End: 2024-02-08

## 2024-02-28 ENCOUNTER — APPOINTMENT (OUTPATIENT)
Dept: MRI IMAGING | Facility: CLINIC | Age: 84
End: 2024-02-28
Payer: MEDICARE

## 2024-02-28 PROCEDURE — 73721 MRI JNT OF LWR EXTRE W/O DYE: CPT | Mod: RT,MH

## 2024-02-29 ENCOUNTER — APPOINTMENT (OUTPATIENT)
Dept: NEUROSURGERY | Facility: CLINIC | Age: 84
End: 2024-02-29
Payer: MEDICARE

## 2024-02-29 VITALS
DIASTOLIC BLOOD PRESSURE: 79 MMHG | HEART RATE: 63 BPM | BODY MASS INDEX: 21.34 KG/M2 | SYSTOLIC BLOOD PRESSURE: 153 MMHG | HEIGHT: 64 IN | WEIGHT: 125 LBS | TEMPERATURE: 97.7 F

## 2024-02-29 DIAGNOSIS — M48.062 SPINAL STENOSIS, LUMBAR REGION WITH NEUROGENIC CLAUDICATION: ICD-10-CM

## 2024-02-29 DIAGNOSIS — Z98.890 OTHER SPECIFIED POSTPROCEDURAL STATES: ICD-10-CM

## 2024-02-29 PROCEDURE — 99204 OFFICE O/P NEW MOD 45 MIN: CPT

## 2024-02-29 NOTE — PLAN
[FreeTextEntry1] :  DIAGNOSIS:  DISK DEGENERATION   TREATMENT PLAN:  NON-SURGICAL   This is a patient with a degenerated lumbar disk.  I have recommended nonsurgical management at this time.  This consists of physical therapy and/or manual medicine in conjunction to medical therapy and other conservative methods.  These include the consideration of trigger point injections and or the utilization of modalities such as TENS where applicable.  The next tier would be the referral to a pain management specialist (anesthesia or physiatry) for the consideration of spinal injections.  This includes the options of epidural steroids, facet injections as well as other novel techniques that may provide pain relief.  Although there is indeed evidence of disk degeneration on imaging, discectomy or spinal fusion for the sole purposes of treating back/leg pain in the absence of a compressive lesion or neurological issue is associated with poor outcomes.       I have reviewed the images in detail with the patient today in my office and have answered all questions regarding this condition to the best of my ability to the patients satisfaction.

## 2024-02-29 NOTE — RESULTS/DATA
[FreeTextEntry1] : MRI of the lumbar spine shows multilevel degenerative changes there is an old L5-S1 laminotomy with a small residual paracentral disc on the left.  There is no evidence of significant critical stenosis at any level   there is no herniated disks or other significant pathology of note.

## 2024-02-29 NOTE — HISTORY OF PRESENT ILLNESS
[de-identified] :  Sona is a pleasant 83-year-old female here for evaluation primarily of her lumbar spine.  She has had upwards of 20 or so orthopedic procedures on the hip knee and ankle including fracture repair and the like.  She is also had cerebrovascular disease treated in the past and has had diagnostic angiography.  She has had multiple surgeries in many different areas and has currently symptoms that are reminiscent of radiculopathy and axial pain.  She also has symptoms that are more consistent with neuropathy as well.  She is taking Plavix and has some bleeding and healing problems on the side.  She is doing pain management with varying results and is here for my evaluation with MRI of the lumbar spine for my review.  She had a previous discectomy about 10 years ago at L5-S1.  PCP  Jed Loomis

## 2024-02-29 NOTE — CONSULT LETTER
[Dear  ___] : Dear  [unfilled], [Courtesy Letter:] : I had the pleasure of seeing your patient, [unfilled], in my office today. [Consult Closing:] : Thank you very much for allowing me to participate in the care of this patient.  If you have any questions, please do not hesitate to contact me. [Sincerely,] : Sincerely, [FreeTextEntry3] : Riley Camara DO, MPH, FACS Director, Neurosurgery and Spine  [facility]   , Neurological Surgery [place] and [facility]/  [DrJorge  ___] : Dr. HUMPHREYS [DrJorge ___] : Dr. HUMPHREYS

## 2024-04-24 NOTE — ED PROVIDER NOTE - CPE EDP SKIN NORM
Regimen: Orencia    Dr. Indio Eduardo is supervising provider today.    Reviewed and verified Advanced Directives: No: Patient declined to create/provide document at this time     Nursing Assessment: A focused nursing assessment  was performed and the patient reports the following: Fever: NO  Chills: NO  Other signs of infection: NO  Cough: NO  Shortness of Breath: NO  Fatigue/Weakness: NO    Pre-Treatment: - VS completed  - Weight verified  - Premed orders verified prior to administration  - Therapy plan parameters verified   - Therapy drug dose verified  - Patient identification verified (first name, last name, )  - Alaris pump settings verified    Treatment: Refer to [facility] and MAR for line assessment and medication administration, Blood return confirmed before, during and after treatment administered, and Infusion pump used for non-vesicant drugs    Post Treatment: Treatment tolerated well; no adverse reaction    Education: No new instructions needed    Next appointment scheduled:   Future Appointments   Date Time Provider Department Center   2024  1:45 PM [facility]   5/15/2024  3:30 PM Kami Romano PA-C [facility]    2024  9:15 AM [place] [address] NURSE [address]   2024  9:00 AM [facility]    2024  8:30 AM [place] [facility]        Patient instructed to call the office with any questions or concerns.    Patient Discharged: patient discharged to home per self, ambulatory    
normal...

## 2024-05-09 ENCOUNTER — TRANSCRIPTION ENCOUNTER (OUTPATIENT)
Age: 84
End: 2024-05-09

## 2024-05-20 ENCOUNTER — NON-APPOINTMENT (OUTPATIENT)
Age: 84
End: 2024-05-20

## 2024-05-20 ENCOUNTER — APPOINTMENT (OUTPATIENT)
Dept: CARDIOLOGY | Facility: CLINIC | Age: 84
End: 2024-05-20
Payer: MEDICARE

## 2024-05-20 VITALS
DIASTOLIC BLOOD PRESSURE: 58 MMHG | HEIGHT: 64 IN | BODY MASS INDEX: 21.34 KG/M2 | OXYGEN SATURATION: 99 % | HEART RATE: 56 BPM | SYSTOLIC BLOOD PRESSURE: 120 MMHG | WEIGHT: 125 LBS

## 2024-05-20 DIAGNOSIS — E78.00 PURE HYPERCHOLESTEROLEMIA, UNSPECIFIED: ICD-10-CM

## 2024-05-20 DIAGNOSIS — I73.9 PERIPHERAL VASCULAR DISEASE, UNSPECIFIED: ICD-10-CM

## 2024-05-20 DIAGNOSIS — I25.10 ATHEROSCLEROTIC HEART DISEASE OF NATIVE CORONARY ARTERY W/OUT ANGINA PECTORIS: ICD-10-CM

## 2024-05-20 DIAGNOSIS — Z01.810 ENCOUNTER FOR PREPROCEDURAL CARDIOVASCULAR EXAMINATION: ICD-10-CM

## 2024-05-20 DIAGNOSIS — I10 ESSENTIAL (PRIMARY) HYPERTENSION: ICD-10-CM

## 2024-05-20 DIAGNOSIS — I44.0 ATRIOVENTRICULAR BLOCK, FIRST DEGREE: ICD-10-CM

## 2024-05-20 PROCEDURE — 93000 ELECTROCARDIOGRAM COMPLETE: CPT

## 2024-05-20 PROCEDURE — G2211 COMPLEX E/M VISIT ADD ON: CPT

## 2024-05-20 PROCEDURE — 99214 OFFICE O/P EST MOD 30 MIN: CPT

## 2024-05-20 NOTE — CARDIOLOGY SUMMARY
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 11/8/2021 sinus rhythm, [place] .21 1st degree heart block, old inferoapical MI, 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 3/15/2022 sinus rhythm, [place] .21 1st degree heart block, old inferoapical MI, 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 5/14/2022 sinus rhythm, TX .21 1st degree heart block, old inferoapical MI, 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 11/7/2022 sinus rhythm, TX .21 1st degree heart block, old inferoapical MI, 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 2/27/2023 sinus rhythm, TX .21 1st degree heart block, old inferoapical MI, 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 5/8/2023 sinus rhythm, TX .21 1st degree heart block, right axis, old inferoapical MI, 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 11/6/2023 sinus rhythm, TX .21 1st degree heart block, right axis, old inferoapical MI, unchanged. 1/29/2024 5/20/2024 sinus rhythm, TX .21 1st degree heart block, low voltage, right axis, old inferoapical MI, unchanged. [de-identified] : 11/6/2023 normal LV function, EF 50-55%, normal valves, poorly visualized right heart probably normal, trace TR. 6/12/2018 - EF 70-75%, calcified aortic valve, normal opening. 1. Endocardium not well visualized; grossly normal left ventricular systolic function. 2. Reversal of the E-A  waves of the mitral inflow pattern is consistent with diastolic LV dysfunction. 3. The right ventricle is not well visualized; grossly normal right ventricular systolic function. 4. No pericardial effusion seen.  [de-identified] : 11/13/2014 patent RPL stent, 100% Cx-OM well collateralized, moderate mid-LAD lesion\par  11/8/2017, 7/29/2013 RPL ALEJANDRA

## 2024-05-20 NOTE — DISCUSSION/SUMMARY
[Patient] : the patient [EKG obtained to assist in diagnosis and management of assessed problem(s)] : EKG obtained to assist in diagnosis and management of assessed problem(s) [First Degree A-V Block] : first degree AV block [Coronary Artery Disease] : coronary artery disease [Hyperlipidemia] : hyperlipidemia [Diabetes Mellitus] : diabetes mellitus [Essential Hypertension] : essential hypertension [Responding to Treatment] : responding to treatment [Peripheral Vascular Disease] : peripheral vascular disease [None] : There are no changes in medication management [de-identified] : reasonable to stop clopidogrel for 7 days prior to Neurosurgical spine procedure and Orthopedic hand surgery, but remain on aspirin [de-identified] : 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 about 40 years ago [de-identified] : remains on DAPT, vertebral artery stent, but can stop clopidogrel for 7 days when necessary for invasive procedures [FreeTextEntry1] : 83 year old woman who has stable ischemic heart disease, controlled hypertension, has never manifested congestive heart failure and rhythm is stable. Functional capacity is satisfactory and thus there is no contraindication to Spine and Pain management procedure or Orthopedic Hand Surgery and accompanying anesthesia. It is reasonable to remain off clopidogrel for 7 days prior to each procedure and resume when hemostasis deemed adequate, but she must remain on uninterrupted low dose aspirin. She should remain on all other cardiac medications.

## 2024-05-20 NOTE — PHYSICAL EXAM
[Ptosis Right] : ptosis of the right eye was noted [Ptosis Left] : ptosis of the left eye was noted [Not Palpable] : not palpable [Normal Rate] : normal [Rhythm Regular] : regular [Normal S1] : normal S1 [Normal S2] : normal S2 [II] : a grade 2 [Carotids] : the murmur was transmitted to the carotid arteries [No Pitting Edema] : no pitting edema present [2+] : left 2+ [0] : right 0 [1+] : left 1+ [Abnormal Gait] : abnormal gait [Normal] : alert and oriented, normal memory [Click] : no click [Right Carotid Bruit] : no bruit heard over the right carotid [Left Carotid Bruit] : no bruit heard over the left carotid [de-identified] : ptosis repaired [de-identified] : right subclavian bruit [de-identified] : bilateral cervical rib resection, pectus excavatum, muscle atrophy at meadial right ankle

## 2024-05-20 NOTE — HISTORY OF PRESENT ILLNESS
[FreeTextEntry1] : Mrs. 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 To evaluate another recurrence of symptoms referred for repeat coronary angiography and there was no re-stenosis or other occlusive disease. She subsequently began pulmonary evaluation and therapy and lost significant amount of weight with marked improvement and she continues walking for exercise. She has no palpitations. 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 Shortly after last visit few weeks ago began experiencing exertional angina, "like an elephant sitting on my chest" walking short distance and especially climbing flight of stairs. Presented to [facility] and underwent another intervention on re-stenosis in vicinity of posterolateral artery. Has no angina since and no dyspnea with routine exertion.  Presented with "7 blackouts" described as beginning 5/1/2018, stereotyped episodes of sudden severe occipital pain - "like somewhat hit me with a shovel in the back of my head" followed by total blindness lasting ~30 seconds and followed by ataxia. 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 Subsequent further symptoms, then subsided. Had returned to walking regularly. However again having episodes of severe occipital headache with vertigo and even when not acutely symptomatic balance is impaired. Episodes are fairly consistently provoked by leaning head back against a surface and putting pressure on back of head.  Had multiple procedures on neck with some response, but episodes still provoked, especially by lying head fully back, such as during dental cleanings, CT scans if lies flat. Now effectively avoiding the provocations. Also has had serial colonoscopies and multiple polyps resected.  Headaches controlled with Botox injections, having balance issues. However recently walking regularly, building back to former levels, at this time several blocks around community without chest pain or dyspnea. Had multi-level procedure on her back, vertebral fusions and went well, symptoms improved, though will remain with some back pain and has sciatic pain. However, all generally better controlled at this time, walking few blocks. Has no chest pain, no dyspnea, no palpitations and less issues with dizziness, vertigo.  Remains active, denies chest pain, dyspnea, orthopnea. Planning multiple right trigger finger, endoscopic carpal tunnel release.

## 2024-06-10 ENCOUNTER — APPOINTMENT (OUTPATIENT)
Dept: CT IMAGING | Facility: CLINIC | Age: 84
End: 2024-06-10
Payer: MEDICARE

## 2024-06-10 PROCEDURE — 70450 CT HEAD/BRAIN W/O DYE: CPT | Mod: MH

## 2024-08-07 ENCOUNTER — APPOINTMENT (OUTPATIENT)
Dept: RADIOLOGY | Facility: CLINIC | Age: 84
End: 2024-08-07

## 2024-08-07 PROCEDURE — 71046 X-RAY EXAM CHEST 2 VIEWS: CPT

## 2024-08-28 ENCOUNTER — NON-APPOINTMENT (OUTPATIENT)
Age: 84
End: 2024-08-28

## 2024-08-28 ENCOUNTER — APPOINTMENT (OUTPATIENT)
Dept: OPHTHALMOLOGY | Facility: CLINIC | Age: 84
End: 2024-08-28
Payer: MEDICARE

## 2024-08-28 PROCEDURE — 92134 CPTRZ OPH DX IMG PST SGM RTA: CPT

## 2024-08-28 PROCEDURE — 92014 COMPRE OPH EXAM EST PT 1/>: CPT

## 2024-09-10 ENCOUNTER — APPOINTMENT (OUTPATIENT)
Dept: ULTRASOUND IMAGING | Facility: CLINIC | Age: 84
End: 2024-09-10
Payer: MEDICARE

## 2024-09-10 ENCOUNTER — APPOINTMENT (OUTPATIENT)
Dept: MAMMOGRAPHY | Facility: CLINIC | Age: 84
End: 2024-09-10
Payer: MEDICARE

## 2024-09-10 PROCEDURE — 77067 SCR MAMMO BI INCL CAD: CPT

## 2024-09-10 PROCEDURE — 76641 ULTRASOUND BREAST COMPLETE: CPT | Mod: 50,GZ

## 2024-09-10 PROCEDURE — 77063 BREAST TOMOSYNTHESIS BI: CPT

## 2024-10-16 ENCOUNTER — NON-APPOINTMENT (OUTPATIENT)
Age: 84
End: 2024-10-16

## 2024-10-16 ENCOUNTER — APPOINTMENT (OUTPATIENT)
Dept: OPHTHALMOLOGY | Facility: CLINIC | Age: 84
End: 2024-10-16
Payer: MEDICARE

## 2024-10-16 PROCEDURE — 92014 COMPRE OPH EXAM EST PT 1/>: CPT

## 2024-10-16 PROCEDURE — 92134 CPTRZ OPH DX IMG PST SGM RTA: CPT

## 2024-11-18 ENCOUNTER — NON-APPOINTMENT (OUTPATIENT)
Age: 84
End: 2024-11-18

## 2024-11-18 ENCOUNTER — APPOINTMENT (OUTPATIENT)
Dept: CARDIOLOGY | Facility: CLINIC | Age: 84
End: 2024-11-18
Payer: MEDICARE

## 2024-11-18 VITALS — SYSTOLIC BLOOD PRESSURE: 100 MMHG | HEART RATE: 80 BPM | DIASTOLIC BLOOD PRESSURE: 60 MMHG

## 2024-11-18 VITALS
SYSTOLIC BLOOD PRESSURE: 102 MMHG | DIASTOLIC BLOOD PRESSURE: 58 MMHG | BODY MASS INDEX: 21.11 KG/M2 | WEIGHT: 123 LBS | HEART RATE: 86 BPM | OXYGEN SATURATION: 96 %

## 2024-11-18 DIAGNOSIS — I44.0 ATRIOVENTRICULAR BLOCK, FIRST DEGREE: ICD-10-CM

## 2024-11-18 DIAGNOSIS — I25.10 ATHEROSCLEROTIC HEART DISEASE OF NATIVE CORONARY ARTERY W/OUT ANGINA PECTORIS: ICD-10-CM

## 2024-11-18 DIAGNOSIS — I10 ESSENTIAL (PRIMARY) HYPERTENSION: ICD-10-CM

## 2024-11-18 DIAGNOSIS — E78.00 PURE HYPERCHOLESTEROLEMIA, UNSPECIFIED: ICD-10-CM

## 2024-11-18 DIAGNOSIS — I73.9 PERIPHERAL VASCULAR DISEASE, UNSPECIFIED: ICD-10-CM

## 2024-11-18 DIAGNOSIS — I35.0 NONRHEUMATIC AORTIC (VALVE) STENOSIS: ICD-10-CM

## 2024-11-18 PROCEDURE — 99214 OFFICE O/P EST MOD 30 MIN: CPT

## 2024-11-18 PROCEDURE — 93000 ELECTROCARDIOGRAM COMPLETE: CPT

## 2024-11-18 PROCEDURE — G2211 COMPLEX E/M VISIT ADD ON: CPT

## 2024-12-18 ENCOUNTER — TRANSCRIPTION ENCOUNTER (OUTPATIENT)
Age: 84
End: 2024-12-18

## 2025-03-17 ENCOUNTER — APPOINTMENT (OUTPATIENT)
Dept: CARDIOLOGY | Facility: CLINIC | Age: 85
End: 2025-03-17
Payer: MEDICARE

## 2025-03-17 ENCOUNTER — NON-APPOINTMENT (OUTPATIENT)
Age: 85
End: 2025-03-17

## 2025-03-17 VITALS
HEIGHT: 64 IN | WEIGHT: 123 LBS | HEART RATE: 78 BPM | SYSTOLIC BLOOD PRESSURE: 122 MMHG | DIASTOLIC BLOOD PRESSURE: 76 MMHG | OXYGEN SATURATION: 97 % | BODY MASS INDEX: 21 KG/M2

## 2025-03-17 DIAGNOSIS — I44.0 ATRIOVENTRICULAR BLOCK, FIRST DEGREE: ICD-10-CM

## 2025-03-17 DIAGNOSIS — I73.9 PERIPHERAL VASCULAR DISEASE, UNSPECIFIED: ICD-10-CM

## 2025-03-17 DIAGNOSIS — I10 ESSENTIAL (PRIMARY) HYPERTENSION: ICD-10-CM

## 2025-03-17 DIAGNOSIS — I25.10 ATHEROSCLEROTIC HEART DISEASE OF NATIVE CORONARY ARTERY W/OUT ANGINA PECTORIS: ICD-10-CM

## 2025-03-17 DIAGNOSIS — E78.00 PURE HYPERCHOLESTEROLEMIA, UNSPECIFIED: ICD-10-CM

## 2025-03-17 DIAGNOSIS — I35.0 NONRHEUMATIC AORTIC (VALVE) STENOSIS: ICD-10-CM

## 2025-03-17 PROCEDURE — G2211 COMPLEX E/M VISIT ADD ON: CPT

## 2025-03-17 PROCEDURE — 99214 OFFICE O/P EST MOD 30 MIN: CPT

## 2025-03-17 PROCEDURE — 93306 TTE W/DOPPLER COMPLETE: CPT

## 2025-03-17 PROCEDURE — 93000 ELECTROCARDIOGRAM COMPLETE: CPT

## 2025-04-03 ENCOUNTER — RESULT REVIEW (OUTPATIENT)
Age: 85
End: 2025-04-03

## 2025-04-07 ENCOUNTER — APPOINTMENT (OUTPATIENT)
Dept: OPHTHALMOLOGY | Facility: CLINIC | Age: 85
End: 2025-04-07

## 2025-04-17 ENCOUNTER — APPOINTMENT (OUTPATIENT)
Dept: OPHTHALMOLOGY | Facility: CLINIC | Age: 85
End: 2025-04-17

## 2025-04-22 NOTE — PHYSICAL THERAPY INITIAL EVALUATION ADULT - STANDING BALANCE: DYNAMIC, REHAB EVAL
Jean-Paul Rome  [address] [facility] [id number]    04/22/25    I have been trying to reach you by phone to see how you are doing and if I can help you in any way.     As your Care Manager, my role is to support you and make it easier to make sure you get the best health care possible.     This includes:    Helping you know your choices    Teaming up with you to arrange health care services.     Providing education and community support           Please call me ASAP to let me know how you are doing.  You can reach me at    [phone]   8:30 AM to 5:00 PM CST (Monday-Friday)     If I am not free when you call, you can leave a private message on my voicemail, and I will call you back within one business day.     I look forward to hearing from you soon.     Sincerely,     Dalila Chambers, RN  Care Manager  [facility]  
good balance

## 2025-05-12 ENCOUNTER — APPOINTMENT (OUTPATIENT)
Dept: CARDIOLOGY | Facility: CLINIC | Age: 85
End: 2025-05-12

## 2025-07-17 ENCOUNTER — APPOINTMENT (OUTPATIENT)
Dept: RADIOLOGY | Facility: CLINIC | Age: 85
End: 2025-07-17
Payer: MEDICARE

## 2025-07-17 PROCEDURE — 71046 X-RAY EXAM CHEST 2 VIEWS: CPT

## 2025-08-24 ENCOUNTER — INPATIENT (INPATIENT)
Facility: HOSPITAL | Age: 85
LOS: 0 days | Discharge: LEFT BEFORE DISCHARGE | DRG: 149 | End: 2025-08-24
Attending: INTERNAL MEDICINE | Admitting: INTERNAL MEDICINE
Payer: MEDICARE

## 2025-08-24 VITALS
TEMPERATURE: 98 F | OXYGEN SATURATION: 97 % | DIASTOLIC BLOOD PRESSURE: 79 MMHG | HEART RATE: 60 BPM | RESPIRATION RATE: 16 BRPM | SYSTOLIC BLOOD PRESSURE: 115 MMHG

## 2025-08-24 VITALS
OXYGEN SATURATION: 98 % | HEIGHT: 64 IN | RESPIRATION RATE: 17 BRPM | WEIGHT: 121.03 LBS | SYSTOLIC BLOOD PRESSURE: 131 MMHG | DIASTOLIC BLOOD PRESSURE: 73 MMHG | HEART RATE: 63 BPM | TEMPERATURE: 97 F

## 2025-08-24 DIAGNOSIS — I10 ESSENTIAL (PRIMARY) HYPERTENSION: ICD-10-CM

## 2025-08-24 DIAGNOSIS — E78.5 HYPERLIPIDEMIA, UNSPECIFIED: ICD-10-CM

## 2025-08-24 DIAGNOSIS — K21.9 GASTRO-ESOPHAGEAL REFLUX DISEASE WITHOUT ESOPHAGITIS: ICD-10-CM

## 2025-08-24 DIAGNOSIS — I65.09 OCCLUSION AND STENOSIS OF UNSPECIFIED VERTEBRAL ARTERY: ICD-10-CM

## 2025-08-24 DIAGNOSIS — I65.09 OCCLUSION AND STENOSIS OF UNSPECIFIED VERTEBRAL ARTERY: Chronic | ICD-10-CM

## 2025-08-24 DIAGNOSIS — R42 DIZZINESS AND GIDDINESS: ICD-10-CM

## 2025-08-24 DIAGNOSIS — I25.10 ATHEROSCLEROTIC HEART DISEASE OF NATIVE CORONARY ARTERY WITHOUT ANGINA PECTORIS: ICD-10-CM

## 2025-08-24 LAB
ALBUMIN SERPL ELPH-MCNC: 4.5 G/DL — SIGNIFICANT CHANGE UP (ref 3.3–5)
ALP SERPL-CCNC: 111 U/L — SIGNIFICANT CHANGE UP (ref 40–120)
ALT FLD-CCNC: 15 U/L — SIGNIFICANT CHANGE UP (ref 10–45)
ANION GAP SERPL CALC-SCNC: 16 MMOL/L — SIGNIFICANT CHANGE UP (ref 5–17)
APTT BLD: 31.6 SEC — SIGNIFICANT CHANGE UP (ref 26.1–36.8)
AST SERPL-CCNC: 23 U/L — SIGNIFICANT CHANGE UP (ref 10–40)
BASOPHILS # BLD AUTO: 0.01 K/UL — SIGNIFICANT CHANGE UP (ref 0–0.2)
BASOPHILS NFR BLD AUTO: 0.1 % — SIGNIFICANT CHANGE UP (ref 0–2)
BILIRUB SERPL-MCNC: 0.4 MG/DL — SIGNIFICANT CHANGE UP (ref 0.2–1.2)
BUN SERPL-MCNC: 42 MG/DL — HIGH (ref 7–23)
CALCIUM SERPL-MCNC: 10.1 MG/DL — SIGNIFICANT CHANGE UP (ref 8.4–10.5)
CHLORIDE SERPL-SCNC: 104 MMOL/L — SIGNIFICANT CHANGE UP (ref 96–108)
CO2 SERPL-SCNC: 22 MMOL/L — SIGNIFICANT CHANGE UP (ref 22–31)
CREAT SERPL-MCNC: 1.03 MG/DL — SIGNIFICANT CHANGE UP (ref 0.5–1.3)
EGFR: 54 ML/MIN/1.73M2 — LOW
EGFR: 54 ML/MIN/1.73M2 — LOW
EOSINOPHIL # BLD AUTO: 0.04 K/UL — SIGNIFICANT CHANGE UP (ref 0–0.5)
EOSINOPHIL NFR BLD AUTO: 0.6 % — SIGNIFICANT CHANGE UP (ref 0–6)
GAS PNL BLDV: SIGNIFICANT CHANGE UP
GLUCOSE SERPL-MCNC: 119 MG/DL — HIGH (ref 70–99)
HCT VFR BLD CALC: 40.8 % — SIGNIFICANT CHANGE UP (ref 34.5–45)
HGB BLD-MCNC: 12.9 G/DL — SIGNIFICANT CHANGE UP (ref 11.5–15.5)
IMM GRANULOCYTES # BLD AUTO: 0.02 K/UL — SIGNIFICANT CHANGE UP (ref 0–0.07)
IMM GRANULOCYTES NFR BLD AUTO: 0.3 % — SIGNIFICANT CHANGE UP (ref 0–0.9)
INR BLD: 1 RATIO — SIGNIFICANT CHANGE UP (ref 0.85–1.16)
LYMPHOCYTES # BLD AUTO: 2.06 K/UL — SIGNIFICANT CHANGE UP (ref 1–3.3)
LYMPHOCYTES NFR BLD AUTO: 29.7 % — SIGNIFICANT CHANGE UP (ref 13–44)
MCHC RBC-ENTMCNC: 29.7 PG — SIGNIFICANT CHANGE UP (ref 27–34)
MCHC RBC-ENTMCNC: 31.6 G/DL — LOW (ref 32–36)
MCV RBC AUTO: 93.8 FL — SIGNIFICANT CHANGE UP (ref 80–100)
MONOCYTES # BLD AUTO: 0.87 K/UL — SIGNIFICANT CHANGE UP (ref 0–0.9)
MONOCYTES NFR BLD AUTO: 12.5 % — SIGNIFICANT CHANGE UP (ref 2–14)
NEUTROPHILS # BLD AUTO: 3.94 K/UL — SIGNIFICANT CHANGE UP (ref 1.8–7.4)
NEUTROPHILS NFR BLD AUTO: 56.8 % — SIGNIFICANT CHANGE UP (ref 43–77)
NRBC # BLD AUTO: 0 K/UL — SIGNIFICANT CHANGE UP (ref 0–0)
NRBC # FLD: 0 K/UL — SIGNIFICANT CHANGE UP (ref 0–0)
NRBC BLD AUTO-RTO: 0 /100 WBCS — SIGNIFICANT CHANGE UP (ref 0–0)
PLATELET # BLD AUTO: 142 K/UL — LOW (ref 150–400)
PMV BLD: 11.8 FL — SIGNIFICANT CHANGE UP (ref 7–13)
POTASSIUM SERPL-MCNC: 4 MMOL/L — SIGNIFICANT CHANGE UP (ref 3.5–5.3)
POTASSIUM SERPL-SCNC: 4 MMOL/L — SIGNIFICANT CHANGE UP (ref 3.5–5.3)
PROT SERPL-MCNC: 7.1 G/DL — SIGNIFICANT CHANGE UP (ref 6–8.3)
PROTHROM AB SERPL-ACNC: 11.6 SEC — SIGNIFICANT CHANGE UP (ref 9.9–13.4)
RBC # BLD: 4.35 M/UL — SIGNIFICANT CHANGE UP (ref 3.8–5.2)
RBC # FLD: 14.2 % — SIGNIFICANT CHANGE UP (ref 10.3–14.5)
SODIUM SERPL-SCNC: 142 MMOL/L — SIGNIFICANT CHANGE UP (ref 135–145)
TROPONIN T, HIGH SENSITIVITY RESULT: 18 NG/L — HIGH
WBC # BLD: 6.94 K/UL — SIGNIFICANT CHANGE UP (ref 3.8–10.5)
WBC # FLD AUTO: 6.94 K/UL — SIGNIFICANT CHANGE UP (ref 3.8–10.5)

## 2025-08-24 PROCEDURE — 84484 ASSAY OF TROPONIN QUANT: CPT

## 2025-08-24 PROCEDURE — 85014 HEMATOCRIT: CPT

## 2025-08-24 PROCEDURE — 85025 COMPLETE CBC W/AUTO DIFF WBC: CPT

## 2025-08-24 PROCEDURE — 93010 ELECTROCARDIOGRAM REPORT: CPT

## 2025-08-24 PROCEDURE — 82330 ASSAY OF CALCIUM: CPT

## 2025-08-24 PROCEDURE — 99285 EMERGENCY DEPT VISIT HI MDM: CPT

## 2025-08-24 PROCEDURE — 70496 CT ANGIOGRAPHY HEAD: CPT | Mod: 26

## 2025-08-24 PROCEDURE — 84295 ASSAY OF SERUM SODIUM: CPT

## 2025-08-24 PROCEDURE — 85730 THROMBOPLASTIN TIME PARTIAL: CPT

## 2025-08-24 PROCEDURE — 80053 COMPREHEN METABOLIC PANEL: CPT

## 2025-08-24 PROCEDURE — 70496 CT ANGIOGRAPHY HEAD: CPT

## 2025-08-24 PROCEDURE — 85610 PROTHROMBIN TIME: CPT

## 2025-08-24 PROCEDURE — 84132 ASSAY OF SERUM POTASSIUM: CPT

## 2025-08-24 PROCEDURE — 36415 COLL VENOUS BLD VENIPUNCTURE: CPT

## 2025-08-24 PROCEDURE — 82803 BLOOD GASES ANY COMBINATION: CPT

## 2025-08-24 PROCEDURE — 70498 CT ANGIOGRAPHY NECK: CPT

## 2025-08-24 PROCEDURE — 70450 CT HEAD/BRAIN W/O DYE: CPT | Mod: 26,XU

## 2025-08-24 PROCEDURE — 82962 GLUCOSE BLOOD TEST: CPT

## 2025-08-24 PROCEDURE — 85018 HEMOGLOBIN: CPT

## 2025-08-24 PROCEDURE — 82435 ASSAY OF BLOOD CHLORIDE: CPT

## 2025-08-24 PROCEDURE — 82947 ASSAY GLUCOSE BLOOD QUANT: CPT

## 2025-08-24 PROCEDURE — 70498 CT ANGIOGRAPHY NECK: CPT | Mod: 26

## 2025-08-24 PROCEDURE — 70450 CT HEAD/BRAIN W/O DYE: CPT

## 2025-08-24 PROCEDURE — 83605 ASSAY OF LACTIC ACID: CPT

## 2025-08-24 PROCEDURE — 99285 EMERGENCY DEPT VISIT HI MDM: CPT | Mod: 25

## 2025-08-24 PROCEDURE — 93005 ELECTROCARDIOGRAM TRACING: CPT

## 2025-08-24 RX ORDER — CLOPIDOGREL BISULFATE 75 MG/1
75 TABLET, FILM COATED ORAL DAILY
Refills: 0 | Status: DISCONTINUED | OUTPATIENT
Start: 2025-08-24 | End: 2025-08-24

## 2025-08-24 RX ORDER — AMLODIPINE BESYLATE 10 MG/1
10 TABLET ORAL DAILY
Refills: 0 | Status: DISCONTINUED | OUTPATIENT
Start: 2025-08-24 | End: 2025-08-24

## 2025-08-24 RX ORDER — EZETIMIBE 10 MG/1
1 TABLET ORAL
Refills: 0 | DISCHARGE

## 2025-08-24 RX ORDER — ROSUVASTATIN CALCIUM 20 MG/1
40 TABLET, FILM COATED ORAL AT BEDTIME
Refills: 0 | Status: DISCONTINUED | OUTPATIENT
Start: 2025-08-24 | End: 2025-08-24

## 2025-08-24 RX ORDER — ASPIRIN 325 MG
81 TABLET ORAL DAILY
Refills: 0 | Status: DISCONTINUED | OUTPATIENT
Start: 2025-08-24 | End: 2025-08-24

## 2025-08-24 RX ORDER — METFORMIN HYDROCHLORIDE 850 MG/1
1 TABLET ORAL
Refills: 0 | DISCHARGE

## 2025-08-24 RX ORDER — HEPARIN SODIUM 1000 [USP'U]/ML
5000 INJECTION INTRAVENOUS; SUBCUTANEOUS EVERY 12 HOURS
Refills: 0 | Status: DISCONTINUED | OUTPATIENT
Start: 2025-08-24 | End: 2025-08-24

## 2025-08-24 RX ORDER — DULOXETINE 20 MG/1
0 CAPSULE, DELAYED RELEASE ORAL
Refills: 0 | DISCHARGE

## 2025-08-24 RX ORDER — GABAPENTIN 400 MG/1
1 CAPSULE ORAL
Refills: 0 | DISCHARGE

## 2025-08-24 RX ORDER — ASPIRIN 325 MG
1 TABLET ORAL
Refills: 0 | DISCHARGE

## 2025-08-24 RX ORDER — GABAPENTIN 400 MG/1
100 CAPSULE ORAL
Refills: 0 | Status: DISCONTINUED | OUTPATIENT
Start: 2025-08-24 | End: 2025-08-24

## 2025-08-24 RX ORDER — EZETIMIBE 10 MG/1
10 TABLET ORAL DAILY
Refills: 0 | Status: DISCONTINUED | OUTPATIENT
Start: 2025-08-24 | End: 2025-08-24

## 2025-09-15 ENCOUNTER — APPOINTMENT (OUTPATIENT)
Dept: MAMMOGRAPHY | Facility: CLINIC | Age: 85
End: 2025-09-15
Payer: MEDICARE

## 2025-09-15 PROCEDURE — 77067 SCR MAMMO BI INCL CAD: CPT

## 2025-09-15 PROCEDURE — 77063 BREAST TOMOSYNTHESIS BI: CPT

## 2025-09-20 ENCOUNTER — NON-APPOINTMENT (OUTPATIENT)
Age: 85
End: 2025-09-20

## (undated) DEVICE — TUBING SUCTION 20FT

## (undated) DEVICE — SOL INJ NS 0.9% 500ML 2 PORT

## (undated) DEVICE — SUCTION YANKAUER NO CONTROL VENT

## (undated) DEVICE — SNARE EXACTO COLD 9MMX230CM

## (undated) DEVICE — TUBING SUCTION CONN 6FT STERILE

## (undated) DEVICE — ELCTR GROUNDING PAD ADULT COVIDIEN

## (undated) DEVICE — BIOPSY FORCEP RADIAL JAW 4 STANDARD WITH NEEDLE

## (undated) DEVICE — FOLEY HOLDER STATLOCK 2 WAY ADULT

## (undated) DEVICE — CATH IV SAFE BC 22G X 1" (BLUE)

## (undated) DEVICE — CLAMP BX HOT RAD JAW 3

## (undated) DEVICE — TUBING IV SET GRAVITY 3Y 100" MACRO

## (undated) DEVICE — SYR LUER LOK 50CC

## (undated) DEVICE — FORCEP RADIAL JAW 4 240CM DISP

## (undated) DEVICE — SENSOR O2 FINGER ADULT 24/CA

## (undated) DEVICE — BRUSH COLONOSCOPY CYTOLOGY

## (undated) DEVICE — PACK IV START WITH CHG

## (undated) DEVICE — IRRIGATOR BIO SHIELD

## (undated) DEVICE — FORCEP RADIAL JAW 4 JUMBO 2.8MM 3.2MM 240CM ORANGE DISP

## (undated) DEVICE — CATH IV SAFE BC 20G X 1.16" (PINK)

## (undated) DEVICE — POLY TRAP ETRAP